# Patient Record
Sex: MALE | Race: WHITE | NOT HISPANIC OR LATINO | Employment: OTHER | URBAN - METROPOLITAN AREA
[De-identification: names, ages, dates, MRNs, and addresses within clinical notes are randomized per-mention and may not be internally consistent; named-entity substitution may affect disease eponyms.]

---

## 2023-09-23 ENCOUNTER — APPOINTMENT (OUTPATIENT)
Dept: RADIOLOGY | Facility: MEDICAL CENTER | Age: 63
DRG: 065 | End: 2023-09-23
Attending: STUDENT IN AN ORGANIZED HEALTH CARE EDUCATION/TRAINING PROGRAM
Payer: COMMERCIAL

## 2023-09-23 ENCOUNTER — HOSPITAL ENCOUNTER (INPATIENT)
Facility: MEDICAL CENTER | Age: 63
LOS: 10 days | DRG: 065 | End: 2023-10-04
Attending: STUDENT IN AN ORGANIZED HEALTH CARE EDUCATION/TRAINING PROGRAM | Admitting: STUDENT IN AN ORGANIZED HEALTH CARE EDUCATION/TRAINING PROGRAM
Payer: COMMERCIAL

## 2023-09-23 DIAGNOSIS — K11.7 DROOLING: ICD-10-CM

## 2023-09-23 DIAGNOSIS — R47.01 APHASIA: ICD-10-CM

## 2023-09-23 DIAGNOSIS — I63.9 ACUTE CVA (CEREBROVASCULAR ACCIDENT) (HCC): ICD-10-CM

## 2023-09-23 DIAGNOSIS — I10 PRIMARY HYPERTENSION: ICD-10-CM

## 2023-09-23 DIAGNOSIS — I48.91 ATRIAL FIBRILLATION, UNSPECIFIED TYPE (HCC): ICD-10-CM

## 2023-09-23 LAB
ALBUMIN SERPL BCP-MCNC: 3.5 G/DL (ref 3.2–4.9)
ALBUMIN/GLOB SERPL: 1 G/DL
ALP SERPL-CCNC: 239 U/L (ref 30–99)
ALT SERPL-CCNC: 69 U/L (ref 2–50)
ANION GAP SERPL CALC-SCNC: 11 MMOL/L (ref 7–16)
AST SERPL-CCNC: 25 U/L (ref 12–45)
BASOPHILS # BLD AUTO: 0.3 % (ref 0–1.8)
BASOPHILS # BLD: 0.02 K/UL (ref 0–0.12)
BILIRUB SERPL-MCNC: 0.3 MG/DL (ref 0.1–1.5)
BUN SERPL-MCNC: 23 MG/DL (ref 8–22)
CALCIUM ALBUM COR SERPL-MCNC: 9 MG/DL (ref 8.5–10.5)
CALCIUM SERPL-MCNC: 8.6 MG/DL (ref 8.5–10.5)
CHLORIDE SERPL-SCNC: 107 MMOL/L (ref 96–112)
CO2 SERPL-SCNC: 22 MMOL/L (ref 20–33)
CREAT SERPL-MCNC: 1.26 MG/DL (ref 0.5–1.4)
EOSINOPHIL # BLD AUTO: 0.25 K/UL (ref 0–0.51)
EOSINOPHIL NFR BLD: 3.6 % (ref 0–6.9)
ERYTHROCYTE [DISTWIDTH] IN BLOOD BY AUTOMATED COUNT: 44.5 FL (ref 35.9–50)
GFR SERPLBLD CREATININE-BSD FMLA CKD-EPI: 64 ML/MIN/1.73 M 2
GLOBULIN SER CALC-MCNC: 3.4 G/DL (ref 1.9–3.5)
GLUCOSE SERPL-MCNC: 261 MG/DL (ref 65–99)
HCT VFR BLD AUTO: 29.4 % (ref 42–52)
HGB BLD-MCNC: 9.3 G/DL (ref 14–18)
IMM GRANULOCYTES # BLD AUTO: 0.05 K/UL (ref 0–0.11)
IMM GRANULOCYTES NFR BLD AUTO: 0.7 % (ref 0–0.9)
LYMPHOCYTES # BLD AUTO: 1.08 K/UL (ref 1–4.8)
LYMPHOCYTES NFR BLD: 15.7 % (ref 22–41)
MCH RBC QN AUTO: 27.4 PG (ref 27–33)
MCHC RBC AUTO-ENTMCNC: 31.6 G/DL (ref 32.3–36.5)
MCV RBC AUTO: 86.7 FL (ref 81.4–97.8)
MONOCYTES # BLD AUTO: 0.45 K/UL (ref 0–0.85)
MONOCYTES NFR BLD AUTO: 6.5 % (ref 0–13.4)
NEUTROPHILS # BLD AUTO: 5.03 K/UL (ref 1.82–7.42)
NEUTROPHILS NFR BLD: 73.2 % (ref 44–72)
NRBC # BLD AUTO: 0 K/UL
NRBC BLD-RTO: 0 /100 WBC (ref 0–0.2)
PLATELET # BLD AUTO: 254 K/UL (ref 164–446)
PMV BLD AUTO: 9.6 FL (ref 9–12.9)
POTASSIUM SERPL-SCNC: 3.8 MMOL/L (ref 3.6–5.5)
PROT SERPL-MCNC: 6.9 G/DL (ref 6–8.2)
RBC # BLD AUTO: 3.39 M/UL (ref 4.7–6.1)
SODIUM SERPL-SCNC: 140 MMOL/L (ref 135–145)
WBC # BLD AUTO: 6.9 K/UL (ref 4.8–10.8)

## 2023-09-23 PROCEDURE — 83036 HEMOGLOBIN GLYCOSYLATED A1C: CPT

## 2023-09-23 PROCEDURE — 99285 EMERGENCY DEPT VISIT HI MDM: CPT

## 2023-09-23 PROCEDURE — 70450 CT HEAD/BRAIN W/O DYE: CPT

## 2023-09-23 PROCEDURE — 71045 X-RAY EXAM CHEST 1 VIEW: CPT

## 2023-09-23 PROCEDURE — 85025 COMPLETE CBC W/AUTO DIFF WBC: CPT

## 2023-09-23 PROCEDURE — 36415 COLL VENOUS BLD VENIPUNCTURE: CPT

## 2023-09-23 PROCEDURE — 80061 LIPID PANEL: CPT

## 2023-09-23 PROCEDURE — 80053 COMPREHEN METABOLIC PANEL: CPT

## 2023-09-24 ENCOUNTER — APPOINTMENT (OUTPATIENT)
Dept: RADIOLOGY | Facility: MEDICAL CENTER | Age: 63
DRG: 065 | End: 2023-09-24
Payer: COMMERCIAL

## 2023-09-24 PROBLEM — I48.91 AF (ATRIAL FIBRILLATION) (HCC): Status: ACTIVE | Noted: 2023-09-24

## 2023-09-24 PROBLEM — I10 HYPERTENSION: Status: ACTIVE | Noted: 2023-09-24

## 2023-09-24 PROBLEM — I63.9 ACUTE CVA (CEREBROVASCULAR ACCIDENT) (HCC): Status: ACTIVE | Noted: 2023-09-24

## 2023-09-24 PROBLEM — E11.59 TYPE 2 DIABETES MELLITUS WITH CIRCULATORY DISORDER, WITHOUT LONG-TERM CURRENT USE OF INSULIN (HCC): Status: ACTIVE | Noted: 2023-09-24

## 2023-09-24 LAB
APTT PPP: 24.6 SEC (ref 24.7–36)
CHOLEST SERPL-MCNC: 91 MG/DL (ref 100–199)
EST. AVERAGE GLUCOSE BLD GHB EST-MCNC: 169 MG/DL
GLUCOSE BLD STRIP.AUTO-MCNC: 114 MG/DL (ref 65–99)
GLUCOSE BLD STRIP.AUTO-MCNC: 156 MG/DL (ref 65–99)
GLUCOSE BLD STRIP.AUTO-MCNC: 167 MG/DL (ref 65–99)
GLUCOSE BLD STRIP.AUTO-MCNC: 176 MG/DL (ref 65–99)
GLUCOSE BLD STRIP.AUTO-MCNC: 194 MG/DL (ref 65–99)
GLUCOSE BLD STRIP.AUTO-MCNC: 265 MG/DL (ref 65–99)
GLUCOSE BLD STRIP.AUTO-MCNC: 79 MG/DL (ref 65–99)
HBA1C MFR BLD: 7.5 % (ref 4–5.6)
HDLC SERPL-MCNC: 27 MG/DL
INR PPP: 1.09 (ref 0.87–1.13)
LDLC SERPL CALC-MCNC: 48 MG/DL
PROTHROMBIN TIME: 14.2 SEC (ref 12–14.6)
TRIGL SERPL-MCNC: 79 MG/DL (ref 0–149)

## 2023-09-24 PROCEDURE — 700117 HCHG RX CONTRAST REV CODE 255: Performed by: STUDENT IN AN ORGANIZED HEALTH CARE EDUCATION/TRAINING PROGRAM

## 2023-09-24 PROCEDURE — 70496 CT ANGIOGRAPHY HEAD: CPT

## 2023-09-24 PROCEDURE — 700102 HCHG RX REV CODE 250 W/ 637 OVERRIDE(OP)

## 2023-09-24 PROCEDURE — 700101 HCHG RX REV CODE 250

## 2023-09-24 PROCEDURE — 99222 1ST HOSP IP/OBS MODERATE 55: CPT | Performed by: STUDENT IN AN ORGANIZED HEALTH CARE EDUCATION/TRAINING PROGRAM

## 2023-09-24 PROCEDURE — 700111 HCHG RX REV CODE 636 W/ 250 OVERRIDE (IP)

## 2023-09-24 PROCEDURE — 82962 GLUCOSE BLOOD TEST: CPT | Mod: 91

## 2023-09-24 PROCEDURE — 99223 1ST HOSP IP/OBS HIGH 75: CPT | Performed by: STUDENT IN AN ORGANIZED HEALTH CARE EDUCATION/TRAINING PROGRAM

## 2023-09-24 PROCEDURE — 700102 HCHG RX REV CODE 250 W/ 637 OVERRIDE(OP): Performed by: STUDENT IN AN ORGANIZED HEALTH CARE EDUCATION/TRAINING PROGRAM

## 2023-09-24 PROCEDURE — 70498 CT ANGIOGRAPHY NECK: CPT

## 2023-09-24 PROCEDURE — A9270 NON-COVERED ITEM OR SERVICE: HCPCS

## 2023-09-24 PROCEDURE — A9270 NON-COVERED ITEM OR SERVICE: HCPCS | Performed by: STUDENT IN AN ORGANIZED HEALTH CARE EDUCATION/TRAINING PROGRAM

## 2023-09-24 PROCEDURE — 85730 THROMBOPLASTIN TIME PARTIAL: CPT

## 2023-09-24 PROCEDURE — 85610 PROTHROMBIN TIME: CPT

## 2023-09-24 PROCEDURE — 92611 MOTION FLUOROSCOPY/SWALLOW: CPT

## 2023-09-24 PROCEDURE — 36415 COLL VENOUS BLD VENIPUNCTURE: CPT

## 2023-09-24 PROCEDURE — 96374 THER/PROPH/DIAG INJ IV PUSH: CPT

## 2023-09-24 PROCEDURE — 0042T CT-CEREBRAL PERFUSION ANALYSIS: CPT

## 2023-09-24 PROCEDURE — 96372 THER/PROPH/DIAG INJ SC/IM: CPT

## 2023-09-24 PROCEDURE — 770020 HCHG ROOM/CARE - TELE (206)

## 2023-09-24 PROCEDURE — 74230 X-RAY XM SWLNG FUNCJ C+: CPT

## 2023-09-24 PROCEDURE — 700105 HCHG RX REV CODE 258: Performed by: STUDENT IN AN ORGANIZED HEALTH CARE EDUCATION/TRAINING PROGRAM

## 2023-09-24 PROCEDURE — 92610 EVALUATE SWALLOWING FUNCTION: CPT

## 2023-09-24 PROCEDURE — 96375 TX/PRO/DX INJ NEW DRUG ADDON: CPT

## 2023-09-24 RX ORDER — BACLOFEN 10 MG/1
10 TABLET ORAL 2 TIMES DAILY PRN
Status: ON HOLD | COMMUNITY
End: 2023-10-04

## 2023-09-24 RX ORDER — ASPIRIN 81 MG/1
81 TABLET, CHEWABLE ORAL DAILY
Status: DISCONTINUED | OUTPATIENT
Start: 2023-09-24 | End: 2023-09-25

## 2023-09-24 RX ORDER — ATORVASTATIN CALCIUM 40 MG/1
40 TABLET, FILM COATED ORAL EVERY EVENING
Status: DISCONTINUED | OUTPATIENT
Start: 2023-09-24 | End: 2023-10-04 | Stop reason: HOSPADM

## 2023-09-24 RX ORDER — SCOLOPAMINE TRANSDERMAL SYSTEM 1 MG/1
1 PATCH, EXTENDED RELEASE TRANSDERMAL
Status: DISCONTINUED | OUTPATIENT
Start: 2023-09-24 | End: 2023-10-04 | Stop reason: HOSPADM

## 2023-09-24 RX ORDER — ASPIRIN 325 MG
325 TABLET ORAL DAILY
Status: DISCONTINUED | OUTPATIENT
Start: 2023-09-24 | End: 2023-09-24

## 2023-09-24 RX ORDER — ASPIRIN 81 MG/1
324 TABLET, CHEWABLE ORAL ONCE
Status: DISCONTINUED | OUTPATIENT
Start: 2023-09-24 | End: 2023-09-24

## 2023-09-24 RX ORDER — ATORVASTATIN CALCIUM 40 MG/1
40 TABLET, FILM COATED ORAL DAILY
COMMUNITY
End: 2023-10-22 | Stop reason: SDUPTHER

## 2023-09-24 RX ORDER — AMLODIPINE BESYLATE 5 MG/1
10 TABLET ORAL
Status: DISCONTINUED | OUTPATIENT
Start: 2023-09-25 | End: 2023-10-04 | Stop reason: HOSPADM

## 2023-09-24 RX ORDER — ACETAMINOPHEN 500 MG
500-2000 TABLET ORAL 2 TIMES DAILY PRN
Status: ON HOLD | COMMUNITY
End: 2023-10-04

## 2023-09-24 RX ORDER — OXYCODONE HYDROCHLORIDE 5 MG/1
2.5 TABLET ORAL
Status: DISCONTINUED | OUTPATIENT
Start: 2023-09-24 | End: 2023-10-04 | Stop reason: HOSPADM

## 2023-09-24 RX ORDER — LABETALOL HYDROCHLORIDE 5 MG/ML
10 INJECTION, SOLUTION INTRAVENOUS EVERY 4 HOURS PRN
Status: DISCONTINUED | OUTPATIENT
Start: 2023-09-24 | End: 2023-10-04 | Stop reason: HOSPADM

## 2023-09-24 RX ORDER — ASPIRIN 300 MG/1
300 SUPPOSITORY RECTAL DAILY
Status: DISCONTINUED | OUTPATIENT
Start: 2023-09-25 | End: 2023-09-24

## 2023-09-24 RX ORDER — ATROPINE SULFATE 10 MG/ML
2 SOLUTION/ DROPS OPHTHALMIC EVERY 4 HOURS PRN
Status: DISCONTINUED | OUTPATIENT
Start: 2023-09-24 | End: 2023-10-04 | Stop reason: HOSPADM

## 2023-09-24 RX ORDER — ATORVASTATIN CALCIUM 80 MG/1
80 TABLET, FILM COATED ORAL EVERY EVENING
Status: DISCONTINUED | OUTPATIENT
Start: 2023-09-24 | End: 2023-09-24

## 2023-09-24 RX ORDER — LABETALOL HYDROCHLORIDE 5 MG/ML
10 INJECTION, SOLUTION INTRAVENOUS EVERY 4 HOURS PRN
Status: DISCONTINUED | OUTPATIENT
Start: 2023-09-24 | End: 2023-09-24

## 2023-09-24 RX ORDER — OXYCODONE HYDROCHLORIDE 5 MG/1
5 TABLET ORAL
Status: DISCONTINUED | OUTPATIENT
Start: 2023-09-24 | End: 2023-10-04 | Stop reason: HOSPADM

## 2023-09-24 RX ORDER — SODIUM CHLORIDE, SODIUM LACTATE, POTASSIUM CHLORIDE, CALCIUM CHLORIDE 600; 310; 30; 20 MG/100ML; MG/100ML; MG/100ML; MG/100ML
INJECTION, SOLUTION INTRAVENOUS CONTINUOUS
Status: ACTIVE | OUTPATIENT
Start: 2023-09-24 | End: 2023-09-24

## 2023-09-24 RX ORDER — ASPIRIN 325 MG
325 TABLET, DELAYED RELEASE (ENTERIC COATED) ORAL DAILY
Status: ON HOLD | COMMUNITY
End: 2023-10-04

## 2023-09-24 RX ORDER — BETHANECHOL CHLORIDE 10 MG/1
10 TABLET ORAL 3 TIMES DAILY
Status: ON HOLD | COMMUNITY
End: 2023-10-04

## 2023-09-24 RX ORDER — ACETAMINOPHEN 325 MG/1
650 TABLET ORAL EVERY 6 HOURS PRN
Status: DISCONTINUED | OUTPATIENT
Start: 2023-09-24 | End: 2023-10-04 | Stop reason: HOSPADM

## 2023-09-24 RX ORDER — AMLODIPINE BESYLATE 5 MG/1
5 TABLET ORAL
Status: DISCONTINUED | OUTPATIENT
Start: 2023-09-24 | End: 2023-09-24

## 2023-09-24 RX ORDER — LISINOPRIL 10 MG/1
5 TABLET ORAL
Status: DISCONTINUED | OUTPATIENT
Start: 2023-09-24 | End: 2023-09-24

## 2023-09-24 RX ORDER — HYDRALAZINE HYDROCHLORIDE 20 MG/ML
10 INJECTION INTRAMUSCULAR; INTRAVENOUS EVERY 4 HOURS PRN
Status: DISCONTINUED | OUTPATIENT
Start: 2023-09-24 | End: 2023-10-04 | Stop reason: HOSPADM

## 2023-09-24 RX ORDER — ASPIRIN 300 MG/1
300 SUPPOSITORY RECTAL DAILY
Status: DISCONTINUED | OUTPATIENT
Start: 2023-09-24 | End: 2023-09-25

## 2023-09-24 RX ORDER — ASPIRIN 81 MG/1
81 TABLET, CHEWABLE ORAL DAILY
Status: DISCONTINUED | OUTPATIENT
Start: 2023-09-25 | End: 2023-09-24

## 2023-09-24 RX ORDER — INSULIN GLARGINE 100 [IU]/ML
25 INJECTION, SOLUTION SUBCUTANEOUS EVERY MORNING
COMMUNITY
End: 2023-10-22 | Stop reason: SDUPTHER

## 2023-09-24 RX ORDER — HYDROMORPHONE HYDROCHLORIDE 1 MG/ML
0.25 INJECTION, SOLUTION INTRAMUSCULAR; INTRAVENOUS; SUBCUTANEOUS
Status: DISCONTINUED | OUTPATIENT
Start: 2023-09-24 | End: 2023-10-04 | Stop reason: HOSPADM

## 2023-09-24 RX ORDER — AMLODIPINE BESYLATE 5 MG/1
5 TABLET ORAL DAILY
COMMUNITY
End: 2023-10-22 | Stop reason: SDUPTHER

## 2023-09-24 RX ORDER — DEXTROSE MONOHYDRATE 25 G/50ML
25 INJECTION, SOLUTION INTRAVENOUS
Status: DISCONTINUED | OUTPATIENT
Start: 2023-09-24 | End: 2023-09-26

## 2023-09-24 RX ORDER — INSULIN LISPRO 100 [IU]/ML
1-6 INJECTION, SOLUTION INTRAVENOUS; SUBCUTANEOUS EVERY 6 HOURS
Status: DISCONTINUED | OUTPATIENT
Start: 2023-09-24 | End: 2023-09-26

## 2023-09-24 RX ADMIN — INSULIN GLARGINE-YFGN 10 UNITS: 100 INJECTION, SOLUTION SUBCUTANEOUS at 18:03

## 2023-09-24 RX ADMIN — INSULIN LISPRO 1 UNITS: 100 INJECTION, SOLUTION INTRAVENOUS; SUBCUTANEOUS at 06:09

## 2023-09-24 RX ADMIN — LABETALOL HYDROCHLORIDE 10 MG: 5 INJECTION INTRAVENOUS at 17:53

## 2023-09-24 RX ADMIN — ATROPINE SULFATE 2 DROP: 10 SOLUTION/ DROPS OPHTHALMIC at 22:03

## 2023-09-24 RX ADMIN — ASPIRIN 300 MG: 300 SUPPOSITORY RECTAL at 03:05

## 2023-09-24 RX ADMIN — AMLODIPINE BESYLATE 5 MG: 5 TABLET ORAL at 14:07

## 2023-09-24 RX ADMIN — IOHEXOL 80 ML: 350 INJECTION, SOLUTION INTRAVENOUS at 00:30

## 2023-09-24 RX ADMIN — INSULIN LISPRO 1 UNITS: 100 INJECTION, SOLUTION INTRAVENOUS; SUBCUTANEOUS at 02:45

## 2023-09-24 RX ADMIN — ATORVASTATIN CALCIUM 40 MG: 40 TABLET, FILM COATED ORAL at 18:02

## 2023-09-24 RX ADMIN — SODIUM CHLORIDE, POTASSIUM CHLORIDE, SODIUM LACTATE AND CALCIUM CHLORIDE: 600; 310; 30; 20 INJECTION, SOLUTION INTRAVENOUS at 05:06

## 2023-09-24 RX ADMIN — HYDRALAZINE HYDROCHLORIDE 10 MG: 20 INJECTION, SOLUTION INTRAMUSCULAR; INTRAVENOUS at 15:05

## 2023-09-24 RX ADMIN — OXYCODONE HYDROCHLORIDE 5 MG: 5 TABLET ORAL at 16:29

## 2023-09-24 RX ADMIN — SCOPOLAMINE 1 PATCH: 1.5 PATCH, EXTENDED RELEASE TRANSDERMAL at 03:01

## 2023-09-24 RX ADMIN — ATROPINE SULFATE 2 DROP: 10 SOLUTION/ DROPS OPHTHALMIC at 14:40

## 2023-09-24 RX ADMIN — ACETAMINOPHEN 650 MG: 325 TABLET, FILM COATED ORAL at 16:29

## 2023-09-24 RX ADMIN — IOHEXOL 70 ML: 350 INJECTION, SOLUTION INTRAVENOUS at 00:30

## 2023-09-24 ASSESSMENT — ENCOUNTER SYMPTOMS
SORE THROAT: 0
WEAKNESS: 0
CHILLS: 0
NECK PAIN: 0
SPEECH CHANGE: 1
DOUBLE VISION: 0
DIZZINESS: 0
COUGH: 0
BLURRED VISION: 0
HEADACHES: 0
NAUSEA: 0
HEARTBURN: 0
SHORTNESS OF BREATH: 0
FEVER: 0
FOCAL WEAKNESS: 0
MYALGIAS: 0
PALPITATIONS: 0

## 2023-09-24 ASSESSMENT — LIFESTYLE VARIABLES
TOTAL SCORE: 0
EVER HAD A DRINK FIRST THING IN THE MORNING TO STEADY YOUR NERVES TO GET RID OF A HANGOVER: NO
ON A TYPICAL DAY WHEN YOU DRINK ALCOHOL HOW MANY DRINKS DO YOU HAVE: 0
TOTAL SCORE: 0
HAVE PEOPLE ANNOYED YOU BY CRITICIZING YOUR DRINKING: NO
TOTAL SCORE: 0
DO YOU DRINK ALCOHOL: NO
EVER FELT BAD OR GUILTY ABOUT YOUR DRINKING: NO
AVERAGE NUMBER OF DAYS PER WEEK YOU HAVE A DRINK CONTAINING ALCOHOL: 0
CONSUMPTION TOTAL: NEGATIVE
HOW MANY TIMES IN THE PAST YEAR HAVE YOU HAD 5 OR MORE DRINKS IN A DAY: 0
HAVE YOU EVER FELT YOU SHOULD CUT DOWN ON YOUR DRINKING: NO

## 2023-09-24 ASSESSMENT — PAIN DESCRIPTION - PAIN TYPE: TYPE: ACUTE PAIN

## 2023-09-24 NOTE — ED NOTES
Bedside report to Renea DARLING. Pt on RA, still drooling, able to do self suctioning of oral secretions. Remains aphasic and communicates with [employer].

## 2023-09-24 NOTE — ED NOTES
ERP at bedside. Patient family members reporting last seen normal at 1500. Patient stopped talking around that time and has had difficulty swallowing since then.

## 2023-09-24 NOTE — THERAPY
"   Speech Language Pathology   Videofluoroscopic Swallow Study Evaluation     Patient Name: Zane Galeano  AGE:  63 y.o., SEX:  male  Medical Record #: [id number]  Date of Service: 9/24/2023      History of Present Illness  61 y/o male presented 9/23 with aphasia. Pt reports a \"massive stroke\" two years ago and several TIAs since, as recent as six mo ago.      Pertinent Information  Current Method of Nutrition: NPO until cleared by speech pathology  Patient Behaviors:  (cooperative)  Dentition: Fair, Natural dentition, Some missing dentition  Secretion Management: Drooling; requested suctioning, which was not working correctly in the fluoro suite. Pt frequently using towel to control secretions/anterior loss of bolus  Feeding Tube: None  Tracheostomy: No   Factor(s) Affecting Performance: Suction not available      Discussed the risks, benefits, and alternatives of the VFSS procedure. Patient/family acknowledged and agreed to proceed.      Assessment  Videofluoroscopic Swallow Study was conducted in the Lateral projection(s) to evaluate oropharyngeal swallow function. A radiology tech was present to assist with the procedure.      Positioning: Seated upright in fluoroscopy chair  Anatomic View: Concern for web on esophageal wall; did not appear to have impact on swallow function  Bolus Administration: SLP, Patient  PO Barium Contrast Trials: Varibar thin, Varibar nectar (mildly thick), Liquidised mixed with Varibar powder, Varibar pudding, Soft & Bite sized coated with Varibar powder, Regular solid coated with Varibar pudding, Mixed consistency with Varibar powder      Consistency PAS Score Timing Residue Comments   Thin Liquid 7 During swallow, Post swallow Vallecular Residue: Mild (5%-25%)  Pyriform Sinus Residue: Mild (5%-25%) Tsp - PAS 1  Cup - PAS 1 x2, PAS 2 x3, PAS 3 x1, PAS 7 x1; PAS 3 after x1  Straw - Unable to coordinate   Mildly Thick 7 Before swallow, During Swallow Vallecular Residue: Mild " (5%-25%)  Pyriform Sinus Residue: Mild (5%-25%) Cup - PAS 1 x3, PAS 7   Liquidised 1 N/A Vallecular Residue: Moderate (25%-50%)  Pyriform Sinus Residue: Moderate (25%-50%)    Pudding 1 N/A Vallecular Residue: Trace (1%-5%)  Pyriform Sinus Residue: Trace (1%-5%)    Soft & Bite Sized 1 N/A Vallecular Residue: Mild (5%-25%)  Pyriform Sinus Residue: Moderate (25%-50%)    Regular Solid 1 N/A Vallecular Residue: Mild (5%-25%)  Pyriform Sinus Residue: Mild (5%-25%)    Mixed 3 During Swallow, Post Swallow Vallecular Residue: Mild (5%-25%)  Pyriform Sinus Residue: Moderate (25%-50%) PAS 3 during/after; PAS 1     Penetration-Aspiration Scale (PAS)  1     No contrast enters airway  2     Contrast enters the airway, remains above the vocal folds, and is ejected from the airway (not seen in the airway at the end of the swallow).  3     Contrast enters the airway, remains above the vocal folds, and is not ejected from the airway (is seen in the airway after the swallow).  4     Contrast enters the airway, contacts the vocal folds, and is ejected from the airway.  5     Contrast enters the airway, contacts the vocal folds, and is not ejected from the airway  6     Contrast enters the airway, crosses the plane of the vocal folds, and is ejected from the airway.  7     Contrast enters the airway, crosses the plane of the vocal folds, and is not ejected from the airway despite effort.  8     Contrast enters the airway, crosses the plane of the vocal folds, is not ejected from the airway and there is no response to aspiration.       Oral phase:  Consistent anterior loss of bolus, worse with liquids than solids, to which pt was aware and attempted to control with use of towel. Expectorated partial bolus of SB6 in one attempt. Fair mastication of soft and regular solids. Single instance of loss of bolus control resulting in gross aspiration before/during the swallow of MT2, suspect related to pt throwing his head back in an attempt to  control anterior loss. Unable to coordinate straw suction.       Pharyngeal phase:  Pharyngeal phase characterized by impairments in BOT retraction, epiglottic inversion, LVC, hyolaryngeal movement, and pharyngeal constriction which resulted in the following:  - Single instances of aspiration of TN0 and MT2. Pt was sensate to aspiration, but cough was ineffective to clear completely from the airway. Pt continued to cough throughout the study with likely ongoing sensation to aspiration that did not clear throughout remainder of the study  - Shallow penetration with TN0, MT2, and juice from mixed consistencies during the swallow which inconsistently cleared with completion of the swallow or with cough  - Mild vallecular residue with TN0, MT2, SB6, and RG7 and single instance of moderate vallecular residue with LQ3 that inconsistently cleared spontaneously  - Moderate pyriform sinus residue with LQ3 and SB6/mixed that inconsistently cleared spontaneously  -  Mild PPW residue throughout study that inconsistently clear spontaneously      Esophageal Phase:  AP esophageal screen was not completed given fluoro. No retention or retrograde flow noted in limited view of upper esophagus.       Compensatory Strategies:  Right-head turn - INEFFECTIVE to prevent airway invasion with thins; resulted in aspiration  Cough - INEFFECTIVE to clear aspirate    Neutral/slightly turned down head position - EFFECTIVE to significantly reduce airway invasion  Cleansing swallow - EFFECTIVE to reduce/clear residue  Cough, cleansing swallow - EFFECTIVE to clear penetrate        Severity Rating:   DEVEN: Moderate      Clinical Impressions  The pt presents with a moderate oropharyngeal dysphagia, likely acute related to CVA. Swallow safety is impaired; pharyngeal efficiency is impaired. Would recommend a cautious initiation of PO with a PU/TN diet and STRICT STRATEGY USE at this time. Pt will need 1:1 spv during meals as well as verbal cues to  "reinforce strategy use including head positioning. Pt will likely benefit from repeat diagnostic evaluation prior to upgrading diet given severity of dysphagia and ineffective cough. Pt appears to be a good candidate for ongoing behavorial and exercise-based swallow rehabilitation. Dysphagia outcomes can be maximized with use of mobility as pt is able and frequent, thorough oral care. SLP following closely; will also plan to complete formal aphasia evaluation in coming days.       Recommendations  Puree solids with thin liquids - STRICT STRATEGY USE    Medication: Crush with applesauce, as appropriate, Crush with pudding/puree, as appropriate  Supervision: 1:1 feeding with constant supervision, Encourage self-feeding, may need verbal cues for strategies  Positioning: Fully upright and midline during oral intake, Meals sitting upright in a chair, as tolerated  Strategies: Small bites/sips, Slow rate of intake, No straws, Multiple swallows (2-3) per bite/sips, Use of suction during meals, neutral/downward facing head positioning, physical mobility as tolerated  Oral Care: Q4h  Additional Instrumentation: Repeat diagnostic study when clinically appropriate         SLP Treatment Plan  Treatment Plan: Dysphagia Treatment, Speech-Language Treatment, Patient/Family/Caregiver Training  SLP Frequency: 4x Per Week  Estimated Duration: Until Therapy Goals Met      Anticipated Discharge Needs  Discharge Recommendations: Recommend post-acute placement for additional speech therapy services prior to discharge home   Therapy Recommendations Upon DC: Dysphagia Training, Expression Training, Patient / Family / Caregiver Education, Community Re-Integration (per results of SLE, pending)        Patient / Family Goals  Patient / Family Goal #1: \"I can't control liquids.\"  Short Term Goal # 1: Pt will participate in MBSS w SLP to further evaluate swallow function and inform POC.  Goal Outcome # 1: Goal met, new goal added  Short Term " "Goal # 1 B : Pt will consume diet of PU/TN given 1:1 spv and strict strategy use with no worsening of respiratory status.  Short Term Goal # 2: Pt will complete exercises targeting BOT retraction, epiglottic inversion, LVC, hyolaryngeal movement, and pharyngeal constriction x30 in a session given min cues from SLP with \"good\" accuracy.      Mariam Francis, SLP   "

## 2023-09-24 NOTE — ED NOTES
Pt reporting 8/10 pain in BL feet and states that he normally takes THC for pain. Educated pt not to take THC while in the hospital, updated APRN Jake and orders placed for PRN medications. Medicated pt per MAR.

## 2023-09-24 NOTE — CONSULTS
Neurology Initial Consult H&P  Neurohospitalist Service, [facility]    Referring Physician: KIYA Jackson    Chief Complaint   Patient presents with    Difficulty Breathing     PT BIBA for a partial airway obstruction. Patient was eating at home and exerienced choking on a hamburger. Patient was able to dislodge some of the obstruction on EMS arrival. Patient dislodged what he felt was the rest of it on arrival to ED.        HPI: Zane Galeano is a 63 y.o. male with PMH of prior stroke without residual deficits, atrial fibrillation (no AC), DM, CKD stage IV who presented to [facility] for the onset of aphasia around 2 PM 9/23/2023.  Last known normal was around noon prior to patient going to sleep for a nap.  Patient's family reportedly were aware of patient's speech deficit but it was unclear at the time that would be concerning enough to bring him into the emergency department for evaluation secondary to a patient's previous stroke with similar symptoms.  While out to dinner with his family however patient choked on a hamburger which was what prompted his evaluation in the emergency department.  CT head and CTA were completed.  There was no convincing evidence of acute ischemic injury however there was demonstration of old right parietal occipital encephalomalacia and right basal ganglia and internal capsule lacunar strokes.  CTA demonstrated bilateral MCA arterial occlusions.  CTP imaging did not have demonstrable evidence of an acute perfusion defect.  In the setting of patient's bilateral MCA collateralization there is strong suspicion for moyamoya disease.  Patient reports being homeless and has only had evaluations for prior strokes as an inpatient during hospitalizations.  Patient cannot recall any follow-up with medical providers that would help explain the cause of his recurrent strokes.  Other than patient's acute language deficits which include  inability to produce any meaningful speech but does demonstrate intact comprehension and ability to write a syndrome is most consistent with an aphemia.      Review of systems: In addition to what is detailed in the HPI above, all other systems reviewed and are negative.    Past Medical History:    has no past medical history on file.    FHx:  family history is not on file.    SHx:       Allergies:  Not on File    Medications:    Current Facility-Administered Medications:     aspirin (Asa) chewable tab 324 mg, 324 mg, Oral, Once, Kaylin Villarreal D.O.    lactated ringers infusion, , Intravenous, Continuous, José Barrett M.D., Last Rate: 100 mL/hr at 09/24/23 0506, New Bag at 09/24/23 0506    scopolamine (Transderm-Scop) patch 1 Patch, 1 Patch, Transdermal, Q72HRS, José Barrett M.D., 1 Patch at 09/24/23 0301    atorvastatin (Lipitor) tablet 80 mg, 80 mg, Oral, Q EVENING, José Barrett M.D.    insulin GLARGINE (Lantus,Semglee) injection, 10 Units, Subcutaneous, Q EVENING **AND** insulin lispro (HumaLOG,AdmeLOG) injection, 1-6 Units, Subcutaneous, Q6HRS, 1 Units at 09/24/23 0609 **AND** POC blood glucose manual result, , , Q6H **AND** NOTIFY MD and PharmD, , , Once **AND** Administer 20 grams of glucose (approximately 8 ounces of fruit juice) every 15 minutes PRN FSBG less than 70 mg/dL, , , PRN **AND** dextrose 50% (D50W) injection 25 g, 25 g, Intravenous, Q15 MIN PRN, José Barrett M.D.    aspirin (Asa) chewable tab 81 mg, 81 mg, Oral, DAILY **OR** aspirin (Asa) suppository 300 mg, 300 mg, Rectal, DAILY, José Barrett M.D., 300 mg at 09/24/23 0305    Current Outpatient Medications:     insulin glargine (LANTUS) 100 UNIT/ML SC SOLN, Inject 25 Units under the skin every morning., Disp: , Rfl:     Physical Examination:     General: Patient is awake and in no acute distress  Eye: Examination of optic disks not indicated at this time given acuity of consult  Neck: There is normal range of  "motion  CV: regular rate   Extremities:  clear, dry, intact, without peripheral edema    NEUROLOGICAL EXAM:     BP (!) 171/73   Pulse (!) 58   Temp 36.9 °C (98.4 °F)   Resp 18   Ht 1.803 m (5' 11\")   Wt 63.5 kg (140 lb)   SpO2 96%   BMI 19.53 kg/m²       Mental status: Awake, alert and fully oriented  Speech and language: Patient demonstrates aphemia, he can produce only noises, no words but has full comprehension and can write.   Cranial nerve exam: Pupils are equal, round and reactive to light bilaterally. Visual fields are full. There is no nystagmus. Extraocular muscles are intact. Left facial droop. Sensation in the face is intact to light touch.Tongue deviates to the left  Motor exam: There is sustained antigravity with no downward drift in bilateral arms and legs.  There is no pronator drift. Tone is normal. No abnormal movements were seen on exam.  Sensory exam: Reacts to tactile in all 4 extremities, no neglect to double stim   Deep tendon reflexes:  1 to 2 + throughout. Toes down-going on the right, upgoing on the left  Coordination: No ataxia on bilateral finger-to-nose testing  Gait: Deferred due to patient preference      NIHSS: National Institutes of Health Stroke Scale    [0] 1a:Level of Consciousness    0-alert 1-drowsy   2-stupor   3-coma  [0] 1b:LOC Questions                  0-both  1-one      2-neither  [0] 1c:LOC Commands                   0-both  1-one      2-neither  [0] 2: Best Gaze                     0-nl    1-partial  2-forced  [0] 3: Visual Fields                   0-nl    1-partial  2-complete 3-bilat  [2] 4: Facial Paresis                0-nl    1-minor    2-partial  3-full  MOTOR                       0-nl  [0] 5: Right Arm           1-drift  [0] 6: Left Arm             2-some effort vs gravity  [0] 7: Right Leg           3-no effort vs gravity  [0] 8: Left Leg             4-no movement                             x-untestable  [0] 9: Limb Ataxia                    0-abs   " 1-1_limb   2-2+_limbs       x-untestable  [0] 10:Sensory                        0-nl    1-partial  2-dense  [1] 11:Best Language/Aphasia         0-nl    1-mild/mod 2-severe   3-mute  [0] 12:Dysarthria                     0-nl    1-mild/mod 2-severe       x-untestable  [0] 13:Neglect/Inattention            0-none  1-partial  2-complete  [3] TOTAL Delta 1      Objective Data:    Labs:  Lab Results   Component Value Date/Time    PROTHROMBTM 14.2 09/24/2023 12:23 AM    INR 1.09 09/24/2023 12:23 AM      Lab Results   Component Value Date/Time    WBC 6.9 09/23/2023 11:00 PM    RBC 3.39 (L) 09/23/2023 11:00 PM    HEMOGLOBIN 9.3 (L) 09/23/2023 11:00 PM    HEMATOCRIT 29.4 (L) 09/23/2023 11:00 PM    MCV 86.7 09/23/2023 11:00 PM    MCH 27.4 09/23/2023 11:00 PM    MCHC 31.6 (L) 09/23/2023 11:00 PM    MPV 9.6 09/23/2023 11:00 PM    NEUTSPOLYS 73.20 (H) 09/23/2023 11:00 PM    LYMPHOCYTES 15.70 (L) 09/23/2023 11:00 PM    MONOCYTES 6.50 09/23/2023 11:00 PM    EOSINOPHILS 3.60 09/23/2023 11:00 PM    BASOPHILS 0.30 09/23/2023 11:00 PM      Lab Results   Component Value Date/Time    SODIUM 140 09/23/2023 11:00 PM    POTASSIUM 3.8 09/23/2023 11:00 PM    CHLORIDE 107 09/23/2023 11:00 PM    CO2 22 09/23/2023 11:00 PM    GLUCOSE 261 (H) 09/23/2023 11:00 PM    BUN 23 (H) 09/23/2023 11:00 PM    CREATININE 1.26 09/23/2023 11:00 PM      Lab Results   Component Value Date/Time    CHOLSTRLTOT 91 (L) 09/23/2023 11:00 PM    LDL 48 09/23/2023 11:00 PM    HDL 27 (A) 09/23/2023 11:00 PM    TRIGLYCERIDE 79 09/23/2023 11:00 PM       Lab Results   Component Value Date/Time    ALKPHOSPHAT 239 (H) 09/23/2023 11:00 PM    ASTSGOT 25 09/23/2023 11:00 PM    ALTSGPT 69 (H) 09/23/2023 11:00 PM    TBILIRUBIN 0.3 09/23/2023 11:00 PM        Imaging/Testing:    I interpreted and/or reviewed the patient's neuroimaging    CT-CTA NECK WITH & W/O-POST PROCESSING   Final Result      CT angiogram of the neck within normal limits.      CT-CTA HEAD WITH & W/O-POST PROCESS    Final Result      1.  Occlusion of the M1 segments of the middle cerebral artery is noted bilaterally. These occlusions are likely chronic and collaterals are present as described above.      2.  No other occlusion or filling defect identified.      3.  These findings were discussed with DEVON MEJÍA on 09/24/2023.      CT-CEREBRAL PERFUSION ANALYSIS   Final Result      1.  Cerebral blood flow less than 30% likely representing completed infarct = 28 mL.      2.  T Max more than 6 seconds likely representing combination of completed infarct and ischemia = 44 mL.      3.  Mismatched volume likely representing ischemic brain/penumbra = 16 mL      4.  Please note that the cerebral perfusion was performed on the limited brain tissue around the basal ganglia region. Infarct/ischemia outside the CT perfusion sections can be missed in this study.      CT-HEAD W/O   Final Result         NO ACUTE ABNORMALITIES ARE NOTED ON CT SCAN OF THE HEAD.      Findings are consistent with atrophy.  Decreased attenuation in the periventricular white matter likely indicates microvascular ischemic disease.         DX-CHEST-PORTABLE (1 VIEW)   Final Result         1.  Possible scarring or atelectasis in the left lung base. Aspiration is also possible.      2.  No other infiltrates or consolidations identified.      MR-BRAIN-W/O    (Results Pending)   EC-ECHOCARDIOGRAM COMPLETE W/ CONT    (Results Pending)       Assessment and Plan:    Zane Galeano is a 63 y.o. male with PMH of prior stroke without residual deficits, atrial fibrillation (no AC), DM, CKD stage IV who presented to [facility] for the onset of aphasia around 2 PM 9/23/2023. CTH at [facility] w/ old right parietal occipital encephalomalacia and right basal ganglia and internal capsule lacunar strokes, CTA with bilateral MCA arterial occlusions.  CTP imaging did not have demonstrable evidence of an acute perfusion defect.  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  Patient will be admitted for MRI and secondary stroke work-up.  Patient will be started on antiplatelet medications and have his blood pressure controlled.  Long-term there should be consideration for MCA bypass surgery.    Problem list:  -- Acute ischemic stroke  -- Moyamoya disease  -- Bilateral middle cerebral artery occlusions    Plan:    - Admit to the hospital for further workup of stroke etiology and to provide secondary stroke prevention measures  - q4hr neurochecks  - Goal blood pressure should be systolic 100-140, patient should be started on oral anti-hypertensive regimen and brought down gradually  - obtain normoglycemia and avoid hypo- or hyper -natremia; aim for normothermia  - Continue aspirin 81 mg daily  - Please decrease statin dose to 40 mg qd as LDL 48  - LDL 48, A1c 7.5  - Please obtain brain MRI  - Obtain a 2D echocardiogram w/ bubble study  - evaluate and treat with PT/OT/ST  -- Pending MRI and completion of stroke work up; patient should be counseled on beginning anticoagulation in the setting of atrial fibrillation, CHADSVASC score is 5 points indicating a signficant risk of stroke from cardioembolism, mechanism of current stroke is likely independent of his atrial fibrillation but the additional unmitigated risk of a cardioembolic stroke could be catastrophic in patients current clinical scenario    Laci Guillen III, MD  Vascular Neurology, [facility]

## 2023-09-24 NOTE — ED NOTES
Med rec completed per patient and patient medication list.   Patient unable to communicate verbally, but was able to confirm medications as shown on a medication list from [facility] by writing. Was able to confirm medications, instructions, and dates of last dose through this communication method.   NO Noted home antibiotics in past 30 days. Was prescribed Keflex 500mg on 09/21 but never started.    Patient preferred pharmacy: [facility]

## 2023-09-24 NOTE — ED NOTES
Received bedside report from LISSET Hutchinson. Pt resting in hospital with suction in hand. Pt had clip board to communicate-fresh paper provided. Maintenance  fluids infusing. Call light in reach. Will continue to monitor.

## 2023-09-24 NOTE — ED NOTES
This RN rounded on pt. Pt resting comfortably in bed at this time. On vitals monitor. Respirations equal and unlabored. Vitals signs stable. No acute distress at this time. Call light within reach.

## 2023-09-24 NOTE — THERAPY
"Speech Language Pathology   Clinical Swallow Evaluation     Patient Name: Zane Galeano  AGE:  63 y.o., SEX:  male  Medical Record #: [id number]  Date of Service: 9/24/2023      History of Present Illness  63 y/o male presented 9/23 with aphasia. Pt reports a \"massive stroke\" two years ago and several TIAs since, as recent as six mo ago.    No hx SLP available in Epic; unclear report of previous SLP service from pt.    CXR 9/23:  \"1.  Possible scarring or atelectasis in the left lung base. Aspiration is also possible.  2.  No other infiltrates or consolidations identified.\"    CT Perfusion 9/23:  \"1.  Cerebral blood flow less than 30% likely representing completed infarct = 28 mL.  2.  T Max more than 6 seconds likely representing combination of completed infarct and ischemia = 44 mL.  3.  Mismatched volume likely representing ischemic brain/penumbra = 16 mL\"    CT/CTA Head 9/23:  \"1.  Occlusion of the M1 segments of the middle cerebral artery is noted bilaterally. These occlusions are likely chronic and collaterals are present as described above.  2.  No other occlusion or filling defect identified.\"    MRI Brain pending.    General Information:  Vitals  Pulse Oximetry: 97 %  O2 Delivery Device: None - Room Air  Follows Directives: Yes      Prior Living Situation & Level of Function:  Comments: Previous CVA, reports recovery of function since   Communication: Previous CVA, reports recovery of function since  Swallowing: Previous CVA, reports recovery of function since. Said med team was \"worried about his swallow\" after his last stroke but reports he did not have modified diet or dx evaluation completed. Reports he was eating RG/TN until a few days ago when he had a choking episode with a hamburger       Oral Mechanism Evaluation:  Dentition: Fair, Natural dentition, Some missing dentition   Facial Symmetry: Total right facial droop  Facial Sensation: Equal     Labial Observations: Right sided weakness   Lingual " "Observations: Right lingual deviation  Motor Speech: Very limited verbalizations; dysarthric on these with impact on intelligibility            Laryngeal Function:  Secretion Management: Drooling  Voice Quality: Hoarse  Cough: Perceptually WNL  Comments: Pt reports drooling/anterior loss is not baseline. Is aware/sensate to both and used towel/suction to independently manage      Subjective  Pt agreeable and cooperative with SLP evaluation tasks. Use of writing and Y/N questions for clinical  interview. Needs formal aphasia evaluation. \"When?\"      Assessment  Current Method of Nutrition: NPO until cleared by speech pathology  Positioning: Coulter's (60-90 degrees)  Bolus Administration: Patient    O2 Delivery Device: None - Room Air  Factor(s) Affecting Performance: None  Tracheostomy : No      Swallowing Trials:  Ice:  Offered, pt declined - \"Too cold.\"  Thin Liquid (TN0): Impaired  Liquidised (LQ3): Impaired      Comments: Pt w/ appropriate self-feeding. Able to coordinate straw suction, reported it was more difficult than baseline. Fair bolus stripping from spoon with anterior loss of partial bolus. Swallow was appreciated for all PO with immediate cough following TN0 and throat clear following ~3 bites of LQ3, both concerning for airway invasion. Introduced MBSS to pt who is agreeable.      Clinical Impressions  Pt presents with clinical indicators of and elevated risk for oropharyngeal dysphagia given history of CVA and CT perfusion/clinical presentation concerning for acute CVA. Pt would benefit from further evaluation of swallow using MBSS prior to meaningful initiation of PO. Pt may have ice chips after oral care to prevent further atrophy and xerostomia and to aid with secretion management.         Recommendations  Diet Consistency: NPO pending MBSS  Instrumentation: VFSS (MBSS)  Oral Care: Q4h         SLP Treatment Plan  Treatment Plan: Dysphagia Treatment, Speech-Language Treatment, " "Patient/Family/Caregiver Training  SLP Frequency: 4x Per Week  Estimated Duration: Until Therapy Goals Met      Anticipated Discharge Needs  Discharge Recommendations: Recommend post-acute placement for additional speech therapy services prior to discharge home   Therapy Recommendations Upon DC: Dysphagia Training, Patient / Family / Caregiver Education, Community Re-Integration, Expression Training (per results of SLE, pending)        Patient / Family Goals  Patient / Family Goal #1: \"Can I finish?\"  Short Term Goals  Short Term Goal # 1: Pt will participate in MBSS w SLP to further evaluate swallow function and inform POC.      Mariam Francis, SLP   "

## 2023-09-24 NOTE — ED PROVIDER NOTES
ED Provider Note    CHIEF COMPLAINT  Chief Complaint   Patient presents with    Difficulty Breathing     PT BIBA for a partial airway obstruction. Patient was eating at home and exerienced choking on a hamburger. Patient was able to dislodge some of the obstruction on EMS arrival. Patient dislodged what he felt was the rest of it on arrival to ED.        EXTERNAL RECORDS REVIEWED  Other None    HPI/ROS  LIMITATION TO HISTORY   Select: Aphasia  OUTSIDE HISTORIAN(S):  Family sister-in-law    Zane Galeano is a 62 y.o. male who presents by EMS for airway obstruction.  Patient was reportedly eating dinner when he began choking on a piece of food.  He was having trouble breathing and he went to knock on his sister-in-law's door to alert her of the problem.  In route he had partial improvement of the food obstruction and then on arrival here in the ED he had full resolution of food obstruction witnessed by the nurse.  Patient is aphasic but is writing down answers to questions and is otherwise GCS 15.  He reports he is having no more difficulty breathing.  He is writing down details of his history as he is aphasic.  He reports he has a large stroke 2 years ago that did cause significant impairment of his speech.    Sister-in-law provides further history when she arrives and reports that about 2:30 PM today he had taken a nap while in the car on a road trip here.  He subsequently woke up and was not talking and was aphasic and had been drooling.  He continues with drooling until event where he was eating dinner that brought him in here.  She notes he was talking and acting normal prior to 2:30 PM.    PAST MEDICAL HISTORY   CVA 2 years ago, diabetes, HLD    SURGICAL HISTORY  patient denies any surgical history    FAMILY HISTORY  No family history on file.    SOCIAL HISTORY  Social History     Tobacco Use    Smoking status: Not on file    Smokeless tobacco: Not on file   Substance and Sexual Activity    Alcohol use: Not on  "file    Drug use: Not on file    Sexual activity: Not on file       CURRENT MEDICATIONS  Home Medications       Reviewed by Radha Sam (Pharmacy Tech) on 09/24/23 at 0211  Med List Status: Partial     Medication Last Dose Status   insulin glargine (LANTUS) 100 UNIT/ML SC SOLN 9/21/2023 Active                    ALLERGIES  Not on File    PHYSICAL EXAM  VITAL SIGNS: BP (!) 152/74   Pulse (!) 55   Temp 36.9 °C (98.4 °F)   Resp 20   Ht 1.803 m (5' 11\")   Wt 63.5 kg (140 lb)   SpO2 98%   BMI 19.53 kg/m²    Constitutional: Awake and alert. No acute distress  HEENT: Normal Conjunctiva.  No angioedema, no evidence of airway edema.  Patient is visibly drooling.  He writes down that it is painful to swallow  Neck: Grossly normal range of motion. Airway midline.  Cardiovascular: Normal heart rate, Normal rhythm.  Thorax & Lungs: No respiratory distress. Clear to Auscultation Bilaterally.  No stridor  Abdomen: Normal inspection. Normoactive Bowel sounds. Non tender. Nondistended  Skin: No obvious rash.  Back: No tenderness, No CVA tenderness.   Musculoskeletal: No obvious deformity. Moves all extremities Well.  Neurologic: A&Ox3.   Psychiatric: Mood and affect are appropriate for situation.    LABS  Results for orders placed or performed during the hospital encounter of 09/23/23   CBC WITH DIFFERENTIAL   Result Value Ref Range    WBC 6.9 4.8 - 10.8 K/uL    RBC 3.39 (L) 4.70 - 6.10 M/uL    Hemoglobin 9.3 (L) 14.0 - 18.0 g/dL    Hematocrit 29.4 (L) 42.0 - 52.0 %    MCV 86.7 81.4 - 97.8 fL    MCH 27.4 27.0 - 33.0 pg    MCHC 31.6 (L) 32.3 - 36.5 g/dL    RDW 44.5 35.9 - 50.0 fL    Platelet Count 254 164 - 446 K/uL    MPV 9.6 9.0 - 12.9 fL    Neutrophils-Polys 73.20 (H) 44.00 - 72.00 %    Lymphocytes 15.70 (L) 22.00 - 41.00 %    Monocytes 6.50 0.00 - 13.40 %    Eosinophils 3.60 0.00 - 6.90 %    Basophils 0.30 0.00 - 1.80 %    Immature Granulocytes 0.70 0.00 - 0.90 %    Nucleated RBC 0.00 0.00 - 0.20 /100 WBC    " Neutrophils (Absolute) 5.03 1.82 - 7.42 K/uL    Lymphs (Absolute) 1.08 1.00 - 4.80 K/uL    Monos (Absolute) 0.45 0.00 - 0.85 K/uL    Eos (Absolute) 0.25 0.00 - 0.51 K/uL    Baso (Absolute) 0.02 0.00 - 0.12 K/uL    Immature Granulocytes (abs) 0.05 0.00 - 0.11 K/uL    NRBC (Absolute) 0.00 K/uL   COMP METABOLIC PANEL   Result Value Ref Range    Sodium 140 135 - 145 mmol/L    Potassium 3.8 3.6 - 5.5 mmol/L    Chloride 107 96 - 112 mmol/L    Co2 22 20 - 33 mmol/L    Anion Gap 11.0 7.0 - 16.0    Glucose 261 (H) 65 - 99 mg/dL    Bun 23 (H) 8 - 22 mg/dL    Creatinine 1.26 0.50 - 1.40 mg/dL    Calcium 8.6 8.5 - 10.5 mg/dL    Correct Calcium 9.0 8.5 - 10.5 mg/dL    AST(SGOT) 25 12 - 45 U/L    ALT(SGPT) 69 (H) 2 - 50 U/L    Alkaline Phosphatase 239 (H) 30 - 99 U/L    Total Bilirubin 0.3 0.1 - 1.5 mg/dL    Albumin 3.5 3.2 - 4.9 g/dL    Total Protein 6.9 6.0 - 8.2 g/dL    Globulin 3.4 1.9 - 3.5 g/dL    A-G Ratio 1.0 g/dL   PT/INR   Result Value Ref Range    PT 14.2 12.0 - 14.6 sec    INR 1.09 0.87 - 1.13   PTT   Result Value Ref Range    APTT 24.6 (L) 24.7 - 36.0 sec   ESTIMATED GFR   Result Value Ref Range    GFR (CKD-EPI) 64 >60 mL/min/1.73 m 2   Hemoglobin A1C   Result Value Ref Range    Glycohemoglobin 7.5 (H) 4.0 - 5.6 %    Est Avg Glucose 169 mg/dL   Lipid Profile   Result Value Ref Range    Cholesterol,Tot 91 (L) 100 - 199 mg/dL    Triglycerides 79 0 - 149 mg/dL    HDL 27 (A) >=40 mg/dL    LDL 48 <100 mg/dL   POCT glucose device results   Result Value Ref Range    POC Glucose, Blood 194 (H) 65 - 99 mg/dL         RADIOLOGY  I have independently interpreted the diagnostic imaging associated with this visit and am waiting the final reading from the radiologist.   My preliminary interpretation is as follows: Chest x-ray with no opacity, no foreign body  CT head without intracranial hemorrhage  Radiologist interpretation:   CT-CTA NECK WITH & W/O-POST PROCESSING   Final Result      CT angiogram of the neck within normal  limits.      CT-CTA HEAD WITH & W/O-POST PROCESS   Final Result      1.  Occlusion of the M1 segments of the middle cerebral artery is noted bilaterally. These occlusions are likely chronic and collaterals are present as described above.      2.  No other occlusion or filling defect identified.      3.  These findings were discussed with DEVON MEJÍA on 09/24/2023.      CT-CEREBRAL PERFUSION ANALYSIS   Final Result      1.  Cerebral blood flow less than 30% likely representing completed infarct = 28 mL.      2.  T Max more than 6 seconds likely representing combination of completed infarct and ischemia = 44 mL.      3.  Mismatched volume likely representing ischemic brain/penumbra = 16 mL      4.  Please note that the cerebral perfusion was performed on the limited brain tissue around the basal ganglia region. Infarct/ischemia outside the CT perfusion sections can be missed in this study.      CT-HEAD W/O   Final Result         NO ACUTE ABNORMALITIES ARE NOTED ON CT SCAN OF THE HEAD.      Findings are consistent with atrophy.  Decreased attenuation in the periventricular white matter likely indicates microvascular ischemic disease.         DX-CHEST-PORTABLE (1 VIEW)   Final Result         1.  Possible scarring or atelectasis in the left lung base. Aspiration is also possible.      2.  No other infiltrates or consolidations identified.      MR-BRAIN-W/O    (Results Pending)   EC-ECHOCARDIOGRAM COMPLETE W/ CONT    (Results Pending)         COURSE & MEDICAL DECISION MAKING    ED Observation Status? No; Patient does not meet criteria for ED Observation.     INITIAL ASSESSMENT, COURSE AND PLAN  Care Narrative: 63-year-old male with prior stroke 2 years ago presents initially for airway obstruction that was resolved on arrival and then additionally for aphasia and drooling with last known normal 2:30 PM today.  Afebrile, hypertensive, no tachycardia no hypoxia  On exam he has no focal neurologic deficits and is  moving all extremities and ambulates with normal gait.  He is drooling but not in respiratory distress, no airway edema appreciated.  He is writing down very detailed history and answering my questions appropriately but is aphasic.  CT head CTA imaging was obtained with uncertain timeline of occlusion of the M1 bilaterally.  Neurology was consulted for evaluation and they discussed the case with interventional neurologist.  Patient is outside the window for TNK and no intervention is planned at this time.  Chest x-ray does not demonstrate acute finding.  He will be given aspirin 325 mg chewable for concern of new stroke.  Patient will be admitted for further management by hospitalist and MRI to evaluate for potential new stroke that occurred sometime today.  HTN/IDDM FOLLOW UP:  The patient has known hypertension and is being followed by their primary care doctor      ADDITIONAL PROBLEM LIST  DMII  HTN    DISPOSITION AND DISCUSSIONS  I have discussed management of the patient with the following physicians and GERA's:  Hospitalist, Neurology    Discussion of management with other Q or appropriate source(s): None     FINAL DIAGNOSIS  1. Aphasia Active   2. Drooling Active   3. Acute CVA (cerebrovascular accident) (HCC)           Electronically signed by: Kaylin Villarreal D.O., 9/23/2023 11:25 PM

## 2023-09-24 NOTE — H&P
"Hospital Medicine History & Physical Note    Date of Service  9/24/2023    Primary Care Physician  Pcp Pt States None    Consultants  neurology    Specialist Names: Dr. Guillen    Code Status  Full Code    Chief Complaint  Chief Complaint   Patient presents with    Difficulty Breathing     PT BIBA for a partial airway obstruction. Patient was eating at home and exerienced choking on a hamburger. Patient was able to dislodge some of the obstruction on EMS arrival. Patient dislodged what he felt was the rest of it on arrival to ED.        History of Presenting Illness  Zane Galeano is a 62 y.o. male who presented 9/23/2023 with aphasia.  This is a pleasant man who reports a past history of 1 \"massive stroke\" 2 years ago after which she had aphasia which improved, and 3 TIAs with the most recent one 6 months ago.  Also reports a history of A-fib not on any medications and does not give a clear reason for this, as well as diabetes and chronic kidney disease.  He presents due to sudden onset aphasia after waking up from a nap at around 230 yesterday afternoon.  Last known normal was around noon when he went to bed from a nap and woke up and was unable to speak.  Apparently after that they went out for dinner and he choked on a hamburger which is what brought him into the ED.  Patient was able to dislodge the hamburger.  Given his new onset aphasia work-up for stroke was initiated, CT perfusion scan did show a penumbra of 16 mL, and CTA of the head showed M1 occlusions bilaterally with collaterals.  Neurology was consulted, Dr. Guillen, did not recommend any emergent interventions, recommended admission for MRI and speech therapy and general stroke management.  Patient was given aspirin.    I discussed the plan of care with patient.    Review of Systems  Review of Systems   Constitutional:  Negative for chills and fever.   HENT:  Negative for congestion and sore throat.    Eyes:  Negative for blurred vision " and double vision.   Respiratory:  Negative for cough and shortness of breath.    Cardiovascular:  Negative for chest pain, palpitations and leg swelling.   Gastrointestinal:  Negative for heartburn and nausea.   Genitourinary:  Negative for dysuria and urgency.   Musculoskeletal:  Negative for myalgias and neck pain.   Neurological:  Positive for speech change. Negative for dizziness, focal weakness, weakness and headaches.       Past Medical History   has no past medical history on file.    Surgical History   has no past surgical history on file.     Family History  Unsure of family history is  Family history reviewed with patient. There is no family history that is pertinent to the chief complaint.     Social History       Allergies  Not on File    Medications  None       Physical Exam  Temp:  [36.9 °C (98.4 °F)] 36.9 °C (98.4 °F)  Pulse:  [59-77] 61  Resp:  [16-20] 20  BP: (116-185)/(74-90) 143/85  SpO2:  [96 %-98 %] 97 %  Blood Pressure: (!) 143/85   Temperature: 36.9 °C (98.4 °F)   Pulse: 61   Respiration: 20   Pulse Oximetry: 97 %       Physical Exam  Constitutional:       General: He is not in acute distress.     Appearance: He is not toxic-appearing.   HENT:      Head: Normocephalic and atraumatic.      Nose: Nose normal.      Mouth/Throat:      Mouth: Mucous membranes are moist.      Pharynx: Oropharynx is clear.      Comments: Copious drool  Eyes:      Extraocular Movements: Extraocular movements intact.      Conjunctiva/sclera: Conjunctivae normal.   Cardiovascular:      Rate and Rhythm: Normal rate and regular rhythm.      Pulses: Normal pulses.      Heart sounds: Normal heart sounds.   Pulmonary:      Effort: Pulmonary effort is normal.      Breath sounds: Normal breath sounds.   Abdominal:      General: Abdomen is flat.      Palpations: Abdomen is soft.   Musculoskeletal:         General: No swelling or deformity. Normal range of motion.      Cervical back: Normal range of motion and neck supple.  "  Skin:     General: Skin is warm and dry.   Neurological:      Sensory: No sensory deficit.      Motor: No weakness.      Comments: Aphasic, intact written language and comprehension, can produce some grunts  Tongue deviation to the left         Laboratory:  Recent Labs     09/23/23  2300   WBC 6.9   RBC 3.39*   HEMOGLOBIN 9.3*   HEMATOCRIT 29.4*   MCV 86.7   MCH 27.4   MCHC 31.6*   RDW 44.5   PLATELETCT 254   MPV 9.6     Recent Labs     09/23/23  2300   SODIUM 140   POTASSIUM 3.8   CHLORIDE 107   CO2 22   GLUCOSE 261*   BUN 23*   CREATININE 1.26   CALCIUM 8.6     Recent Labs     09/23/23  2300   ALTSGPT 69*   ASTSGOT 25   ALKPHOSPHAT 239*   TBILIRUBIN 0.3   GLUCOSE 261*     Recent Labs     09/24/23  0023   APTT 24.6*   INR 1.09     No results for input(s): \"NTPROBNP\" in the last 72 hours.  Recent Labs     09/23/23 2300   TRIGLYCERIDE 79   HDL 27*   LDL 48     No results for input(s): \"TROPONINT\" in the last 72 hours.    Imaging:  CT-CTA NECK WITH & W/O-POST PROCESSING   Final Result      CT angiogram of the neck within normal limits.      CT-CTA HEAD WITH & W/O-POST PROCESS   Final Result      1.  Occlusion of the M1 segments of the middle cerebral artery is noted bilaterally. These occlusions are likely chronic and collaterals are present as described above.      2.  No other occlusion or filling defect identified.      3.  These findings were discussed with DEVON MEJÍA on 09/24/2023.      CT-CEREBRAL PERFUSION ANALYSIS   Final Result      1.  Cerebral blood flow less than 30% likely representing completed infarct = 28 mL.      2.  T Max more than 6 seconds likely representing combination of completed infarct and ischemia = 44 mL.      3.  Mismatched volume likely representing ischemic brain/penumbra = 16 mL      4.  Please note that the cerebral perfusion was performed on the limited brain tissue around the basal ganglia region. Infarct/ischemia outside the CT perfusion sections can be missed in this " study.      CT-HEAD W/O   Final Result         NO ACUTE ABNORMALITIES ARE NOTED ON CT SCAN OF THE HEAD.      Findings are consistent with atrophy.  Decreased attenuation in the periventricular white matter likely indicates microvascular ischemic disease.         DX-CHEST-PORTABLE (1 VIEW)   Final Result         1.  Possible scarring or atelectasis in the left lung base. Aspiration is also possible.      2.  No other infiltrates or consolidations identified.      MR-BRAIN-W/O    (Results Pending)   EC-ECHOCARDIOGRAM COMPLETE W/ CONT    (Results Pending)         Assessment/Plan:  Justification for Admission Status  I anticipate this patient will require at least two midnights for appropriate medical management, necessitating inpatient admission because acute CVA    Patient will need a Med/Surg bed on EMERGENCY service .  The need is secondary to above.    * Acute CVA (cerebrovascular accident) (HCC)- (present on admission)  Assessment & Plan  With complete expressive aphasia.  Patient has entirely preserved written language fluency and comprehension.  History of 1 stroke and 3 TIAs per patient, stroke 2 years ago left him with aphasia that improved back to normal apparently.  Neurology consulted, Dr. Guillen, did not recommend any emergent interventions, admit for further work-up of stroke management  -Admit to neuro, telemetry monitoring  -MRI brain -patient likely unable to tolerate lying flat due to inability to manage his secretions.  Discussed with radiology, no easy solution of this.  I have ordered scopolamine patch to help decrease secretions, unsure if this will be adequate on its own, may need to try atropine  -Strict n.p.o., SLP  -Echo with bubble study  -Aspirin, statin  -Lipid panel, A1c  -PT/OT    AF (atrial fibrillation) ([facility])  Assessment & Plan  Patient reports history of A-fib.  Not on any medications for this, anticoagulation nor rate control meds.  Unclear reason why, he denies history of  bleeding.  I do not understand the explanation he writes down.  His brother's partner who is with your is planning on bringing in records from his previous hospital stay, though now it appears at least partial records have been loaded into care everywhere, previously not available.     Currently appears to be in sinus rhythm on monitor, awaiting EKG  Here with acute CVA, possibly thromboembolic given his A-fib not on anticoagulation  Start anticoagulation when appropriate, holding off right now in setting of acute CVA    Type 2 diabetes mellitus with circulatory disorder, without long-term current use of insulin ([facility])  Assessment & Plan  Check A1c  Insulin basal plus correctional        VTE prophylaxis: SCDs/TEDs

## 2023-09-24 NOTE — ED NOTES
Med Rec PARTIAL per Pt and family at bedside.Pt reports there are other medications he was taking but has been out of some for months. Pt's family reports that she will be able to obtain RX bottles for information at a later time.

## 2023-09-24 NOTE — ASSESSMENT & PLAN NOTE
"With aphemia. Unable to form words, however full comprehension and preserved written language fluency.  History of 1 stroke and 3 TIAs per patient, stroke 2 years ago left him with aphasia that improved back to normal apparently.  MRI showed Small areas of acute infarcts in the left parietal lobe and signs concerning for [place]  Echo showed normal LVEF and RVEF with mild MR  -Goal normotension.   -PT/OT/SLP  -Eliquis and statin  -Neurology consulted, recommend follow up with Neuro-IR within 2-3 weeks for diagnostic angiogram to identify potential grafts for EDAS following this will need follow up with Dr. Emili Allison with [facility] within 4-6 weeks to undergo indirect bypass (EDAS)\"    Qunatiferon pending  D/c to recuperative home.  "

## 2023-09-24 NOTE — ED NOTES
Patient medicated per MAR. This RN rounded on pt. Pt resting comfortably in bed at this time. On vitals monitor. Respirations equal and unlabored. Vitals signs stable. No acute distress at this time. Call light within reach.

## 2023-09-24 NOTE — ASSESSMENT & PLAN NOTE
"Patient reports history of A-fib.  Not on any medications for this  -Continue Eliquis\"    Vitals:    10/03/23 1735   BP:    Pulse:    Resp: 16   Temp:    SpO2:      HR stable.  Monitor renal function, bleeding and melena as patient on Eliquis  "

## 2023-09-24 NOTE — ED NOTES
Bedside report from John RN, assumed care done. On RA, No signs of distress. Pt aphasic. Communicates using pen and paper. Able to do self suctioning of oral secretions. Call light within reach. Urinal at bedside. Fall precaution placed.

## 2023-09-24 NOTE — ED TRIAGE NOTES
"Chief Complaint   Patient presents with    Difficulty Breathing     PT BIBA for a partial airway obstruction. Patient was eating at home and exerienced choking on a hamburger. Patient was able to dislodge some of the obstruction on EMS arrival. Patient dislodged what he felt was the rest of it on arrival to ED.        BIB EMS to [facility], pt on monitor and in gown, labs drawn and sent. Pt consists of: Airway obstruction. Patient has hx of stroke.    Medications given en route:None    BP (!) 185/88   Pulse 77   Resp 16   Ht 1.803 m (5' 11\")   Wt 63.5 kg (140 lb)   SpO2 98%   BMI 19.53 kg/m²   "

## 2023-09-25 ENCOUNTER — APPOINTMENT (OUTPATIENT)
Dept: RADIOLOGY | Facility: MEDICAL CENTER | Age: 63
DRG: 065 | End: 2023-09-25
Attending: STUDENT IN AN ORGANIZED HEALTH CARE EDUCATION/TRAINING PROGRAM
Payer: COMMERCIAL

## 2023-09-25 ENCOUNTER — APPOINTMENT (OUTPATIENT)
Dept: CARDIOLOGY | Facility: MEDICAL CENTER | Age: 63
DRG: 065 | End: 2023-09-25
Payer: COMMERCIAL

## 2023-09-25 LAB
ALBUMIN SERPL BCP-MCNC: 3 G/DL (ref 3.2–4.9)
ALBUMIN/GLOB SERPL: 0.8 G/DL
ALP SERPL-CCNC: 182 U/L (ref 30–99)
ALT SERPL-CCNC: 46 U/L (ref 2–50)
ANION GAP SERPL CALC-SCNC: 12 MMOL/L (ref 7–16)
AST SERPL-CCNC: 12 U/L (ref 12–45)
BASOPHILS # BLD AUTO: 0.7 % (ref 0–1.8)
BASOPHILS # BLD: 0.05 K/UL (ref 0–0.12)
BILIRUB SERPL-MCNC: 0.3 MG/DL (ref 0.1–1.5)
BUN SERPL-MCNC: 13 MG/DL (ref 8–22)
CALCIUM ALBUM COR SERPL-MCNC: 9.1 MG/DL (ref 8.5–10.5)
CALCIUM SERPL-MCNC: 8.3 MG/DL (ref 8.5–10.5)
CHLORIDE SERPL-SCNC: 108 MMOL/L (ref 96–112)
CO2 SERPL-SCNC: 21 MMOL/L (ref 20–33)
CREAT SERPL-MCNC: 0.94 MG/DL (ref 0.5–1.4)
EKG IMPRESSION: NORMAL
EOSINOPHIL # BLD AUTO: 0.42 K/UL (ref 0–0.51)
EOSINOPHIL NFR BLD: 6.2 % (ref 0–6.9)
ERYTHROCYTE [DISTWIDTH] IN BLOOD BY AUTOMATED COUNT: 43.4 FL (ref 35.9–50)
GFR SERPLBLD CREATININE-BSD FMLA CKD-EPI: 91 ML/MIN/1.73 M 2
GLOBULIN SER CALC-MCNC: 3.6 G/DL (ref 1.9–3.5)
GLUCOSE BLD STRIP.AUTO-MCNC: 136 MG/DL (ref 65–99)
GLUCOSE BLD STRIP.AUTO-MCNC: 171 MG/DL (ref 65–99)
GLUCOSE SERPL-MCNC: 169 MG/DL (ref 65–99)
HCT VFR BLD AUTO: 28.9 % (ref 42–52)
HGB BLD-MCNC: 9.6 G/DL (ref 14–18)
IMM GRANULOCYTES # BLD AUTO: 0.06 K/UL (ref 0–0.11)
IMM GRANULOCYTES NFR BLD AUTO: 0.9 % (ref 0–0.9)
LV EJECT FRACT  99904: 55
LYMPHOCYTES # BLD AUTO: 1.47 K/UL (ref 1–4.8)
LYMPHOCYTES NFR BLD: 21.7 % (ref 22–41)
MAGNESIUM SERPL-MCNC: 1.5 MG/DL (ref 1.5–2.5)
MCH RBC QN AUTO: 27.9 PG (ref 27–33)
MCHC RBC AUTO-ENTMCNC: 33.2 G/DL (ref 32.3–36.5)
MCV RBC AUTO: 84 FL (ref 81.4–97.8)
MONOCYTES # BLD AUTO: 0.52 K/UL (ref 0–0.85)
MONOCYTES NFR BLD AUTO: 7.7 % (ref 0–13.4)
NEUTROPHILS # BLD AUTO: 4.26 K/UL (ref 1.82–7.42)
NEUTROPHILS NFR BLD: 62.8 % (ref 44–72)
NRBC # BLD AUTO: 0 K/UL
NRBC BLD-RTO: 0 /100 WBC (ref 0–0.2)
PLATELET # BLD AUTO: 245 K/UL (ref 164–446)
PMV BLD AUTO: 9.1 FL (ref 9–12.9)
POTASSIUM SERPL-SCNC: 3.4 MMOL/L (ref 3.6–5.5)
PROT SERPL-MCNC: 6.6 G/DL (ref 6–8.2)
RBC # BLD AUTO: 3.44 M/UL (ref 4.7–6.1)
SODIUM SERPL-SCNC: 141 MMOL/L (ref 135–145)
WBC # BLD AUTO: 6.8 K/UL (ref 4.8–10.8)

## 2023-09-25 PROCEDURE — A9270 NON-COVERED ITEM OR SERVICE: HCPCS

## 2023-09-25 PROCEDURE — A9270 NON-COVERED ITEM OR SERVICE: HCPCS | Mod: JZ | Performed by: HOSPITALIST

## 2023-09-25 PROCEDURE — 82962 GLUCOSE BLOOD TEST: CPT | Mod: 91

## 2023-09-25 PROCEDURE — 70551 MRI BRAIN STEM W/O DYE: CPT

## 2023-09-25 PROCEDURE — 83735 ASSAY OF MAGNESIUM: CPT

## 2023-09-25 PROCEDURE — A9270 NON-COVERED ITEM OR SERVICE: HCPCS | Performed by: STUDENT IN AN ORGANIZED HEALTH CARE EDUCATION/TRAINING PROGRAM

## 2023-09-25 PROCEDURE — 36415 COLL VENOUS BLD VENIPUNCTURE: CPT

## 2023-09-25 PROCEDURE — 700102 HCHG RX REV CODE 250 W/ 637 OVERRIDE(OP)

## 2023-09-25 PROCEDURE — 93306 TTE W/DOPPLER COMPLETE: CPT

## 2023-09-25 PROCEDURE — 92526 ORAL FUNCTION THERAPY: CPT

## 2023-09-25 PROCEDURE — 770020 HCHG ROOM/CARE - TELE (206)

## 2023-09-25 PROCEDURE — 96105 ASSESSMENT OF APHASIA: CPT

## 2023-09-25 PROCEDURE — 700102 HCHG RX REV CODE 250 W/ 637 OVERRIDE(OP): Mod: JZ | Performed by: HOSPITALIST

## 2023-09-25 PROCEDURE — 700102 HCHG RX REV CODE 250 W/ 637 OVERRIDE(OP): Performed by: STUDENT IN AN ORGANIZED HEALTH CARE EDUCATION/TRAINING PROGRAM

## 2023-09-25 PROCEDURE — 93005 ELECTROCARDIOGRAM TRACING: CPT | Performed by: STUDENT IN AN ORGANIZED HEALTH CARE EDUCATION/TRAINING PROGRAM

## 2023-09-25 PROCEDURE — 80053 COMPREHEN METABOLIC PANEL: CPT

## 2023-09-25 PROCEDURE — 99233 SBSQ HOSP IP/OBS HIGH 50: CPT | Performed by: PSYCHIATRY & NEUROLOGY

## 2023-09-25 PROCEDURE — 85025 COMPLETE CBC W/AUTO DIFF WBC: CPT

## 2023-09-25 PROCEDURE — 700111 HCHG RX REV CODE 636 W/ 250 OVERRIDE (IP): Mod: JZ | Performed by: HOSPITALIST

## 2023-09-25 PROCEDURE — 93306 TTE W/DOPPLER COMPLETE: CPT | Mod: 26 | Performed by: INTERNAL MEDICINE

## 2023-09-25 RX ORDER — POTASSIUM CHLORIDE 7.45 MG/ML
10 INJECTION INTRAVENOUS
Status: DISPENSED | OUTPATIENT
Start: 2023-09-25 | End: 2023-09-25

## 2023-09-25 RX ORDER — MAGNESIUM SULFATE HEPTAHYDRATE 40 MG/ML
2 INJECTION, SOLUTION INTRAVENOUS ONCE
Status: COMPLETED | OUTPATIENT
Start: 2023-09-25 | End: 2023-09-25

## 2023-09-25 RX ORDER — POTASSIUM CHLORIDE 20 MEQ/1
40 TABLET, EXTENDED RELEASE ORAL ONCE
Status: COMPLETED | OUTPATIENT
Start: 2023-09-25 | End: 2023-09-25

## 2023-09-25 RX ORDER — UREA 10 %
5 LOTION (ML) TOPICAL ONCE
Status: COMPLETED | OUTPATIENT
Start: 2023-09-25 | End: 2023-09-25

## 2023-09-25 RX ADMIN — OXYCODONE HYDROCHLORIDE 5 MG: 5 TABLET ORAL at 21:52

## 2023-09-25 RX ADMIN — OXYCODONE HYDROCHLORIDE 5 MG: 5 TABLET ORAL at 04:58

## 2023-09-25 RX ADMIN — AMLODIPINE BESYLATE 10 MG: 5 TABLET ORAL at 05:09

## 2023-09-25 RX ADMIN — Medication 5 MG: at 21:39

## 2023-09-25 RX ADMIN — ASPIRIN 81 MG 81 MG: 81 TABLET ORAL at 05:10

## 2023-09-25 RX ADMIN — APIXABAN 5 MG: 5 TABLET, FILM COATED ORAL at 18:05

## 2023-09-25 RX ADMIN — MAGNESIUM SULFATE HEPTAHYDRATE 2 G: 2 INJECTION, SOLUTION INTRAVENOUS at 18:10

## 2023-09-25 RX ADMIN — POTASSIUM CHLORIDE 40 MEQ: 1500 TABLET, EXTENDED RELEASE ORAL at 18:14

## 2023-09-25 RX ADMIN — INSULIN LISPRO 1 UNITS: 100 INJECTION, SOLUTION INTRAVENOUS; SUBCUTANEOUS at 17:59

## 2023-09-25 RX ADMIN — ATROPINE SULFATE 2 DROP: 10 SOLUTION/ DROPS OPHTHALMIC at 03:28

## 2023-09-25 RX ADMIN — INSULIN GLARGINE-YFGN 10 UNITS: 100 INJECTION, SOLUTION SUBCUTANEOUS at 17:58

## 2023-09-25 RX ADMIN — ATORVASTATIN CALCIUM 40 MG: 40 TABLET, FILM COATED ORAL at 18:05

## 2023-09-25 ASSESSMENT — ENCOUNTER SYMPTOMS
NAUSEA: 0
DOUBLE VISION: 0
NECK PAIN: 0
MYALGIAS: 0
BLURRED VISION: 0
SPEECH CHANGE: 1
PALPITATIONS: 0
WEAKNESS: 0
CHILLS: 0
SHORTNESS OF BREATH: 0
FEVER: 0
SORE THROAT: 0
HEADACHES: 0
FOCAL WEAKNESS: 0
COUGH: 0
HEARTBURN: 0
DIZZINESS: 0

## 2023-09-25 ASSESSMENT — COGNITIVE AND FUNCTIONAL STATUS - GENERAL
SUGGESTED CMS G CODE MODIFIER MOBILITY: CJ
DAILY ACTIVITIY SCORE: 22
SUGGESTED CMS G CODE MODIFIER DAILY ACTIVITY: CJ
CLIMB 3 TO 5 STEPS WITH RAILING: A LITTLE
WALKING IN HOSPITAL ROOM: A LITTLE
TOILETING: A LITTLE
MOBILITY SCORE: 22
HELP NEEDED FOR BATHING: A LITTLE

## 2023-09-25 ASSESSMENT — CHA2DS2 SCORE
CHA2DS2 VASC SCORE: 5
AGE 65 TO 74: NO
HYPERTENSION: YES
DIABETES: YES
SEX: MALE
PRIOR STROKE OR TIA OR THROMBOEMBOLISM: YES
CHF OR LEFT VENTRICULAR DYSFUNCTION: NO
AGE 75 OR GREATER: NO
VASCULAR DISEASE: YES

## 2023-09-25 ASSESSMENT — PAIN DESCRIPTION - PAIN TYPE
TYPE: ACUTE PAIN

## 2023-09-25 NOTE — ED NOTES
Assumed care of patient, bedside report received from LISSET Clarke.  Introduced self as pt RN, POC discussed, call light in reach, pt on room air at this time. Fall risk interventions in place, fall risk sign on door and bed locked and in lowest position.

## 2023-09-25 NOTE — ED NOTES
RN helped pt with remote, pt watching tv with even chest rise and fall, reports no needs at this time, call light available and in reach.

## 2023-09-25 NOTE — PROGRESS NOTES
"Hospital Medicine Daily Progress Note    Date of Service  9/24/2023    Chief Complaint  Zane Galeano is a 63 y.o. male admitted 9/23/2023 with aphasia    Hospital Course  Zane Galeano is a 62 y.o. male who presented 9/23/2023 with aphasia.  This is a pleasant man who reports a past history of 1 \"massive stroke\" 2 years ago after which she had aphasia which improved, and 3 TIAs with the most recent one 6 months ago.  Also reports a history of A-fib not on any medications and does not give a clear reason for this, as well as diabetes and chronic kidney disease.      He presents due to sudden onset aphasia after waking up from a nap at around 230 yesterday afternoon.  Last known normal was around noon when he went to bed from a nap and woke up and was unable to speak.  Apparently after that they went out for dinner and he choked on a hamburger which is what brought him into the ED.  Patient was able to dislodge the hamburger.      Given his new onset aphasia work-up for stroke was initiated, CT perfusion scan did show a penumbra of 16 mL, and CTA of the head showed M1 occlusions bilaterally with collaterals.  Neurology was consulted, Dr. Guillen, did not recommend any emergent interventions, recommended admission for MRI and speech therapy and general stroke management.  Patient was given aspirin.    Interval Problem Update  9/24/2023: Patient able to comprehend spoken language and fluently reply in writing.   Discussed with Dr. Guillen. Goal normotension. This morning he is bradycardic and pending speech eval, will start PRN hydralazine for SBP >150. When cleared will restart home amlodipine. If heart rate recovers, will add labetalol as 2nd option.   Secretion control with scopolamine patch inadequate, will add atropine.   PT/OT/SLP  Echo bubble study, MRI  Transfer to neuro floor        I have discussed this patient's plan of care and discharge plan at IDT rounds today with Case Management, Nursing, " Nursing leadership, and other members of the IDT team.    Consultants/Specialty  Vascular neurology    Code Status  Full Code    Disposition  The patient is not medically cleared for discharge to home or a post-acute facility.      I have placed the appropriate orders for post-discharge needs.    Review of Systems  Review of Systems   Constitutional:  Negative for chills and fever.   HENT:  Negative for congestion and sore throat.    Eyes:  Negative for blurred vision and double vision.   Respiratory:  Negative for cough and shortness of breath.    Cardiovascular:  Negative for chest pain, palpitations and leg swelling.   Gastrointestinal:  Negative for heartburn and nausea.   Genitourinary:  Negative for dysuria and urgency.   Musculoskeletal:  Negative for myalgias and neck pain.   Neurological:  Positive for speech change. Negative for dizziness, focal weakness, weakness and headaches.        Physical Exam  Temp:  [36.9 °C (98.4 °F)] 36.9 °C (98.4 °F)  Pulse:  [50-97] 55  Resp:  [12-50] 12  BP: (110-187)/(55-90) 121/58  SpO2:  [93 %-99 %] 97 %    Physical Exam  Constitutional:       General: He is not in acute distress.     Appearance: He is not toxic-appearing.   HENT:      Head: Normocephalic and atraumatic.      Nose: Nose normal.      Mouth/Throat:      Mouth: Mucous membranes are moist.      Pharynx: Oropharynx is clear.      Comments: drooling  Eyes:      Extraocular Movements: Extraocular movements intact.      Conjunctiva/sclera: Conjunctivae normal.   Cardiovascular:      Rate and Rhythm: Normal rate and regular rhythm.      Pulses: Normal pulses.      Heart sounds: Normal heart sounds.   Pulmonary:      Effort: Pulmonary effort is normal.      Breath sounds: Normal breath sounds.   Abdominal:      General: Abdomen is flat.      Palpations: Abdomen is soft.   Musculoskeletal:         General: No swelling or deformity. Normal range of motion.      Cervical back: Normal range of motion and neck supple.    Skin:     General: Skin is warm and dry.   Neurological:      Mental Status: He is oriented to person, place, and time.      Sensory: No sensory deficit.      Motor: No weakness.      Comments: Cannot speak words. Can make some noises. Fully intact comprehension and produces fluent written language. Left facial droop, tongue deviation to the left.          Fluids  No intake or output data in the 24 hours ending 09/24/23 2027    Laboratory  Recent Labs     09/23/23  2300   WBC 6.9   RBC 3.39*   HEMOGLOBIN 9.3*   HEMATOCRIT 29.4*   MCV 86.7   MCH 27.4   MCHC 31.6*   RDW 44.5   PLATELETCT 254   MPV 9.6     Recent Labs     09/23/23  2300   SODIUM 140   POTASSIUM 3.8   CHLORIDE 107   CO2 22   GLUCOSE 261*   BUN 23*   CREATININE 1.26   CALCIUM 8.6     Recent Labs     09/24/23  0023   APTT 24.6*   INR 1.09         Recent Labs     09/23/23  2300   TRIGLYCERIDE 79   HDL 27*   LDL 48       Imaging  CT-CTA NECK WITH & W/O-POST PROCESSING   Final Result      CT angiogram of the neck within normal limits.      CT-CTA HEAD WITH & W/O-POST PROCESS   Final Result      1.  Occlusion of the M1 segments of the middle cerebral artery is noted bilaterally. These occlusions are likely chronic and collaterals are present as described above.      2.  No other occlusion or filling defect identified.      3.  These findings were discussed with DEVON MEJÍA on 09/24/2023.      CT-CEREBRAL PERFUSION ANALYSIS   Final Result      1.  Cerebral blood flow less than 30% likely representing completed infarct = 28 mL.      2.  T Max more than 6 seconds likely representing combination of completed infarct and ischemia = 44 mL.      3.  Mismatched volume likely representing ischemic brain/penumbra = 16 mL      4.  Please note that the cerebral perfusion was performed on the limited brain tissue around the basal ganglia region. Infarct/ischemia outside the CT perfusion sections can be missed in this study.      CT-HEAD W/O   Final Result          NO ACUTE ABNORMALITIES ARE NOTED ON CT SCAN OF THE HEAD.      Findings are consistent with atrophy.  Decreased attenuation in the periventricular white matter likely indicates microvascular ischemic disease.         DX-CHEST-PORTABLE (1 VIEW)   Final Result         1.  Possible scarring or atelectasis in the left lung base. Aspiration is also possible.      2.  No other infiltrates or consolidations identified.      MR-BRAIN-W/O    (Results Pending)   DX-ESOPHAGUS - [id number]    (Results Pending)   EC-ECHOCARDIOGRAM COMPLETE W/ CONT    (Results Pending)        Assessment/Plan  * Acute CVA (cerebrovascular accident) (HCC)- (present on admission)  Assessment & Plan  With aphemia. Unable to form words, however full comprehension and preserved written language fluency.  History of 1 stroke and 3 TIAs per patient, stroke 2 years ago left him with aphasia that improved back to normal apparently.  Dr. Guillen with vascular neurology is following    Goal normotension.     -Admit to neuro, telemetry monitoring  -MRI brain - may be difficult to manage his own secretions during scan. Scopolamine patch did not give full relieve, will try atropine.   -Echo with bubble study  -PT/OT  -NPO until cleared by SLP  -Aspirin, statin  -Lipid panel, A1c    Hypertension  Assessment & Plan  Goal normotension  Restart home amlodipine  Add PRN labetalol, with hydralazine as backup if HR low    AF (atrial fibrillation) ([facility])  Assessment & Plan  Patient reports history of A-fib.  Not on any medications for this, anticoagulation nor rate control meds.  Unclear reason why, he denies history of bleeding.  I do not understand the explanation he writes down.  His brother's partner who is with your is planning on bringing in records from his previous hospital stay, though now it appears at least partial records have been loaded into care everywhere, previously not available.     Currently appears to be in sinus rhythm on monitor,  awaiting EKG  Here with acute CVA, possibly thromboembolic given his A-fib not on anticoagulation  Start anticoagulation when appropriate, holding off right now in setting of acute CVA    Type 2 diabetes mellitus with circulatory disorder, without long-term current use of insulin (HCC)  Assessment & Plan  Check A1c  Insulin basal plus correctional      VTE prophylaxis:   SCDs/TEDs      I have performed a physical exam and reviewed and updated ROS and Plan today (9/24/2023). In review of yesterday's note (9/23/2023), there are no changes except as documented above.

## 2023-09-25 NOTE — THERAPY
"Speech Language Pathology   Communication Evaluation     Patient Name: Zane Galeano  AGE:  63 y.o., SEX:  male  Medical Record #: [id number]  Date of Service: 9/25/2023      History of Present Illness  61 y/o male presented 9/23 with aphasia. Pt reports a \"massive stroke\" two years ago and several TIAs since, as recent as six mo ago.     No hx SLP available in Epic; unclear report of previous SLP service from pt.     CXR 9/23:  \"1.  Possible scarring or atelectasis in the left lung base. Aspiration is also possible.  2.  No other infiltrates or consolidations identified.\"    MRI Brain 9/25:   1.  Small areas of acute infarcts in the left parietal lobe.  2.  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 The blood vessels better evaluated on the recent the CT angiogram.  3.  Chronic infarct in the right temporal and parietal lobes.  4.  Mild chronic microvascular ischemic disease.  5.  Mild cerebral volume loss.    General Information  Vitals  O2 Delivery Device: None - Room Air  Level of Consciousness: Alert, Awake  Patient Behaviors:  (pleasant;cooperative)  Orientation: Oriented x 4  Follows Directives: Yes    Prior Living Situation & Level of Function  Prior Services: Home-Independent  Lives with - Patient's Self Care Capacity: Alone and Able to Care For Self  Comments: Pt endorsed that he currently lives alone, however, has plans to move in with his brother/sister in law and rent a room from them in [place].  Communication: Pt with previous CVA, endorsed working with SLP in the hospital to improve intelligibility/word finding deficits. Pt endorsed recovery of function since.  Swallowing: Previous CVA, reports recovery of function since. Said med team was \"worried about his swallow\" after his last stroke but reports he did not have modified diet or dx evaluation completed. Reports he was eating RG/TN until a few days ago when he had a choking episode with a " "efrem.     Subjective  Pt was cleared by RN for aphasia evaluation. Pt was agreeable and cooperative with SLP tasks. Pt utilizing yes/no questions, encoding information, and some verbal output to provide information on PLOF to SLP. \"Please help me talk.\"    Communication Domain(s)  Expressive Language: Moderate  Receptive Language: Mild  Reading: Mild  Writing: Moderate   Motor Speech: Severe - estimated to be about 30% intelligible at the conversational level      Assessment    Western Aphasia Battery Revised Beside Version:   The Western Aphasia Battery-Revised (WAB-R) Beside Version was administered. The “bedside” WAB-R is a shortened version of the Western Aphasia Battery--Revised.  The battery is used to assess a patient’s language function following stroke, dementia, or other acquired neurologic disorder.  The results of the battery provide diagnostic information as to the presence, severity, and type of aphasia. It is designed to be administered at a patient’s bedside, when there are time constraints, issues of cooperation, or comorbidities that prevent more comprehensive testing.  The following results were obtained:    Spontaneous Speech  Content: 6/10  Fluency: 4/10  Auditory Verbal Comprehension  Yes/No Questions: /10  Sequential Commands: 5/10  Repetition: 9/10  Object Namin.5 /10    Bedside Aphasia Score: 69.2/100    The Bedside Aphasia Score is an essential summary value of an individual's aphasic deficit. It is proportional to the severity of aphasia regardless of the type or etiology. The aphasia quotient is useful in assisting and distinguishing between aphasic and non-aphasic patients. An AQ of 0-25 is very severe; an AQ of 26-50 is severe; an AQ of 51-75 is moderate; and an AQ of 76 and above is mild. The patient may be considered normal or not aphasic if the aphasia quotient is 93.8 or above.     Readin/10 -difficult to discern error vs omission d/t severe dysarthria of speech "   Writin/10    Bedside Language Score: 68.5/100    The Bedside Language Quotient (LQ) combines oral and written language scores to emphasize the communicative importance and the relationship between the two modalities.    Bedside Aphasia Classification: Transcortical Motor    Clinical Impressions  Pt is presenting with severe dysarthria and moderate aphasia- most closely aligned with transcortical motor subtype. Areas of relative strength including: auditory/verbal comprehension, repetition, object naming, and writing. Areas of weakness including: content, spontaneous speech, following sequential commands, and reading. It is important to note deficits in reading and at the conversational level are difficult to discern semantic/phonemic paraphasias/word-finding deficits d/t severe dysarthria of speech. Pt will benefit from language tx in the acute setting/next level of care. Pt will benefit from direct supervision with IADL activities upon dc d/t deficits in both receptive/expressive language abilities. Thank you.     NOTE: It is not within the scope of practice of Speech-Language Pathologists to determine patient capacity. Please defer to the physician or psych to complete this assessment.     Recommendations  Supervision Needs Upons Discharge: Direct assistance with IADLs (see below)  IADLs: Medication management, Financial management, Appointment management     SLP Treatment Plan  Treatment Plan: Dysphagia Treatment, Speech-Language Treatment, Patient/Family/Caregiver Training  SLP Frequency: 4x Per Week  Estimated Duration: Until Therapy Goals Met    Anticipated Discharge Needs  Discharge Recommendations: Recommend post-acute placement for additional speech therapy services prior to discharge home  Therapy Recommendations Upon DC: Dysphagia Training, Expression Training, Comprehension Training, Writing Training, Patient / Family / Caregiver Education, Community Re-Integration    Patient / Family  "Goals  Patient / Family Goal #1: \"I can't control liquids.\"  Goal #1 Outcome: Progressing as expected  Short Term Goal # 1: Pt will participate in MBSS w SLP to further evaluate swallow function and inform POC.  Goal Outcome # 1: Goal met, new goal added  Short Term Goal # 1 B : Pt will consume diet of PU/TN given 1:1 spv and strict strategy use with no worsening of respiratory status.  Goal Outcome  # 1 B: Progressing slower than expected  Short Term Goal # 2: Pt will complete exercises targeting BOT retraction, epiglottic inversion, LVC, hyolaryngeal movement, and pharyngeal constriction x30 in a session given min cues from SLP with \"good\" accuracy.  Goal Outcome # 2 : Progressing slower than expected  Short Term Goal # 3: Utilizing a total communication approach, pt will express wants/needs independently with >90% accuracy.  Short Term Goal # 4: With MOD cues, pt will utilize 'clear speech' techniques to increase intelligibility at the sentence level to >65%.  Short Term Goal # 5: With MOD cues, pt will follow complex 2-3 step commands in order to participate in functional ADL/iADL activities with >75% accuracy.    Carmita Pablo, SLP  "

## 2023-09-25 NOTE — DISCHARGE PLANNING
[facility] Transitional Care Coordination    Referral from: Dr. Barrett, Dr. Brice  Insurance Provider on Facesheet: [employer] HMO  Potential Rehab diagnosis: Stroke    Chart review indicates patient has ongoing medical management and may have therapy needs to possibly meet inpatient rehab facility criteria with the goal of returning to community.      D/C Support:  Chart notes reflect patient is homeless - (Dr. Guillen III filed 9:20, 9/24/23.)      Physiatry consult pended while waiting for additional information.  Workup ongoing, PT/OT pending as clinically appropriate.  TCC will follow.  Please reach out sooner if PMR consult requested for medical management.     Last Covid test:    Thank you for the referral.

## 2023-09-25 NOTE — ED NOTES
Pt states he lost his 2nd phone. MRI denies seeing a 2nd phone belonging to the pt. One black touch screen cell phone noted at bedside. Pt also reports losing his sandals on Saturday. Pt denied to look through own backpack in room.

## 2023-09-25 NOTE — ED NOTES
Assumed care of patient, bedside report received from off coming RNRenea.  Introduced self to pt as RN, POC discussed, call light in reach, pt on room air, and bed is low and locked.

## 2023-09-25 NOTE — ED NOTES
PT medicated per MAR, tolerated well. Pt linens reposition and pt resting with even chest rise and fall, reports no needs at this time, call light available and in reach.

## 2023-09-25 NOTE — THERAPY
"Speech Language Pathology   Daily Treatment     Patient Name: Zane Galeano  AGE:  63 y.o., SEX:  male  Medical Record #: [id number]  Date of Service: 9/25/2023    Precautions: Fall Risk, Swallow Precautions     Subjective  Pt was cleared by RN for dysphagia management. Pt was received awake, alert, and sitting at EOB. Thin liquids w/ straws present at bedside. SLP removed straws and reviewed need for 1:1 supervision by RN during PO intake. Pt verbalized understanding. Pt w/ recall of participation in study. Continuing to endorse difficulty managing secretions- noted to spill anteriorly from the oral cavity/utilize oral suction throughout tx. Pt was agreeable to SLP tx, however, endorsing fatigue d/t previously conducted language eval.     Assessment  SLP reviewed results of MBSS with pt including impaired swallow efficiency and airway protection. Additionally, SLP reviewed compensatory strategies. Pt managed sips of TN0 completed. Pt appreciated to tilt head back despite moderate cues. SLP reviewed need for neutral/downward facing head positioning. With continuous cueing, pt was able to modulate. Anterior loss of the bolus appreciated x2. Pt appeared to sensate evidenced via use of towel to catch spilled secretions. Per MBSS, exercises targeting  BOT retraction, epiglottic inversion, LVC, hyolaryngeal movement, and pharyngeal constriction. SLP provided education on effortful swallow. Pt completed effortful swallow x5 with good effort and accuracy. Pt declining additional exercises and PO intake d/t fatigue. \"That feels like a workout.\"     Clinical Impressions  Per MBSS completed on 9/24, pt presents with a moderate oropharyngeal dysphagia, likely acute related to CVA. Would recommend a cautious continuation of PO with a PU/TN diet and STRICT STRATEGY USE at this time. Pt will need 1:1 spv during meals as well as verbal cues to reinforce strategy use including head positioning. Pt will likely benefit from repeat " "diagnostic evaluation prior to upgrading diet given severity of dysphagia and ineffective cough. Pt appears to be a good candidate for ongoing behavorial and exercise-based swallow rehabilitation. Dysphagia outcomes can be maximized with use of mobility as pt is able and frequent, thorough oral care.     Recommendations  Dysphagia Treatment  Diet Consistency: Puree solids (PU4), Thin liquids (TN0)  Instrumentation: Instrumental swallow study pending clinical progress  Medication: Crush with applesauce, as appropriate, Crush with pudding/puree, as appropriate  Supervision: 1:1 feeding with constant supervision, Encourage self-feeding   -may need verbal cues for strategies-  Positioning: Fully upright and midline during oral intake, Meals sitting upright in a chair, as tolerated  Risk Management : Small bites/sips, Alternate bites and sips, Slow rate of intake, No straws, Physical mobility, as tolerated, Multiple swallows (2-3) per bite/sips, Use of suction during meals, neutral/downward facing head positioning  Oral Care: Q4h    SLP Treatment Plan  Treatment Plan: Dysphagia Treatment, Speech-Language Treatment, Patient/Family/Caregiver Training  SLP Frequency: 4x Per Week  Estimated Duration: Until Therapy Goals Met    Anticipated Discharge Needs  Discharge Recommendations: Recommend post-acute placement for additional speech therapy services prior to discharge home  Therapy Recommendations Upon DC: Dysphagia Training, Comprehension Training, Expression Training, Writing Training, Patient / Family / Caregiver Education, Community Re-Integration    Patient / Family Goals  Patient / Family Goal #1: \"I can't control liquids.\"  Goal #1 Outcome: Progressing as expected  Short Term Goals  Short Term Goal # 1: Pt will participate in MBSS w SLP to further evaluate swallow function and inform POC.  Goal Outcome # 1: Goal met, new goal added  Short Term Goal # 1 B : Pt will consume diet of PU/TN given 1:1 spv and strict " "strategy use with no worsening of respiratory status.  Goal Outcome  # 1 B: Progressing slower than expected  Short Term Goal # 2: Pt will complete exercises targeting BOT retraction, epiglottic inversion, LVC, hyolaryngeal movement, and pharyngeal constriction x30 in a session given min cues from SLP with \"good\" accuracy.  Goal Outcome # 2 : Progressing slower than expected  Short Term Goal # 3: Utilizing a total communication approach, pt will express wants/needs independently with >90% accuracy.  Goal Outcome  # 3: Other (see comments) (not targeted this session)  Short Term Goal # 4: With MOD cues, pt will utilize 'clear speech' techniques to increase intelligibility at the sentence level to >65%.  Goal Outcome  # 4: Other (see comments) (not targeted this session)  Short Term Goal # 5: With MOD cues, pt will follow complex 2-3 step commands in order to participate in functional ADL/iADL activities with >75% accuracy.  Goal Outcome  # 5: Other (see comments) (not targeted this session)    Carmita Pablo, SLP  "

## 2023-09-25 NOTE — ED NOTES
"Pt seen pulling off monitors, RN came in room and asked pt what was going on. Pt asking for discharge papers, RN explained admit process and apologized for wait. Pt continued to get up and say he wanted to leave. RN asked pt to let her help him take leads off so he didn't fall and asked for time to be able to talk to MD. PT replied \"Fuck you, go to hell\". RN asked pt what was upsetting him and he replied \"bathroom\". Dr. Barrett notified.   "

## 2023-09-25 NOTE — ASSESSMENT & PLAN NOTE
"Goal normotension   home amlodipine, added losartan  PRN labetalol, with hydralazine as backup if HR low\"    Vitals:    10/03/23 1735   BP:    Pulse:    Resp: 16   Temp:    SpO2:      BP stable  "

## 2023-09-25 NOTE — PROGRESS NOTES
"Hospital Medicine Daily Progress Note    Date of Service  9/25/2023    Chief Complaint  Zane Galeano is a 63 y.o. male admitted 9/23/2023 with aphasia    Hospital Course  Zane Galeano is a 62 y.o. male who presented 9/23/2023 with aphasia.  This is a pleasant man who reports a past history of 1 \"massive stroke\" 2 years ago after which she had aphasia which improved, and 3 TIAs with the most recent one 6 months ago.  Also reports a history of A-fib not on any medications and does not give a clear reason for this, as well as diabetes and chronic kidney disease.      He presents due to sudden onset aphasia after waking up from a nap at around 230 yesterday afternoon.  Last known normal was around noon when he went to bed from a nap and woke up and was unable to speak.  Apparently after that they went out for dinner and he choked on a hamburger which is what brought him into the ED.  Patient was able to dislodge the hamburger.      Given his new onset aphasia work-up for stroke was initiated, CT perfusion scan did show a penumbra of 16 mL, and CTA of the head showed M1 occlusions bilaterally with collaterals.  Neurology was consulted, Dr. Guillen, did not recommend any emergent interventions, recommended admission for MRI and speech therapy and general stroke management.  Patient was given aspirin.    Interval Problem Update  Patient's expressive aphasia persist    MRI of the brain ordered    pT OT ordered    Discussed with neurology team Dr. Ferrari-change aspirin to Eliquis      Rehab referral made      I have discussed this patient's plan of care and discharge plan at IDT rounds today with Case Management, Nursing, Nursing leadership, and other members of the IDT team.    Consultants/Specialty  Vascular neurology    Code Status  Full Code    Disposition  The patient is not medically cleared for discharge to home or a post-acute facility.  Anticipate discharge to: home with close outpatient follow-up    I have placed " the appropriate orders for post-discharge needs.    Review of Systems  Review of Systems   Constitutional:  Negative for chills and fever.   HENT:  Negative for congestion and sore throat.    Eyes:  Negative for blurred vision and double vision.   Respiratory:  Negative for cough and shortness of breath.    Cardiovascular:  Negative for chest pain, palpitations and leg swelling.   Gastrointestinal:  Negative for heartburn and nausea.   Genitourinary:  Negative for dysuria and urgency.   Musculoskeletal:  Negative for myalgias and neck pain.   Neurological:  Positive for speech change. Negative for dizziness, focal weakness, weakness and headaches.        Physical Exam  Pulse:  [52-90] 66  Resp:  [12-19] 17  BP: (110-187)/() 143/64  SpO2:  [90 %-99 %] 96 %    Physical Exam  Constitutional:       General: He is not in acute distress.     Appearance: He is not toxic-appearing.   HENT:      Head: Normocephalic and atraumatic.      Nose: Nose normal.      Mouth/Throat:      Mouth: Mucous membranes are moist.      Pharynx: Oropharynx is clear.      Comments: drooling  Eyes:      Extraocular Movements: Extraocular movements intact.      Conjunctiva/sclera: Conjunctivae normal.   Cardiovascular:      Rate and Rhythm: Normal rate and regular rhythm.      Pulses: Normal pulses.      Heart sounds: Normal heart sounds.   Pulmonary:      Effort: Pulmonary effort is normal.      Breath sounds: Normal breath sounds.   Abdominal:      General: Abdomen is flat.      Palpations: Abdomen is soft.   Musculoskeletal:         General: No swelling or deformity. Normal range of motion.      Cervical back: Normal range of motion and neck supple.   Skin:     General: Skin is warm and dry.   Neurological:      Mental Status: He is oriented to person, place, and time.      Sensory: No sensory deficit.      Motor: No weakness.      Comments: Cannot speak words. Can make some noises. Fully intact comprehension and produces fluent written  language. Left facial droop, tongue deviation to the left.        Fluids    Intake/Output Summary (Last 24 hours) at 9/25/2023 1302  Last data filed at 9/25/2023 0542  Gross per 24 hour   Intake 1866.91 ml   Output --   Net 1866.91 ml       Laboratory  Recent Labs     09/23/23  2300 09/25/23  0314   WBC 6.9 6.8   RBC 3.39* 3.44*   HEMOGLOBIN 9.3* 9.6*   HEMATOCRIT 29.4* 28.9*   MCV 86.7 84.0   MCH 27.4 27.9   MCHC 31.6* 33.2   RDW 44.5 43.4   PLATELETCT 254 245   MPV 9.6 9.1       Recent Labs     09/23/23 2300 09/25/23  0314   SODIUM 140 141   POTASSIUM 3.8 3.4*   CHLORIDE 107 108   CO2 22 21   GLUCOSE 261* 169*   BUN 23* 13   CREATININE 1.26 0.94   CALCIUM 8.6 8.3*       Recent Labs     09/24/23  0023   APTT 24.6*   INR 1.09           Recent Labs     09/23/23 2300   TRIGLYCERIDE 79   HDL 27*   LDL 48         Imaging  MR-BRAIN-W/O   Final Result      1.  Small areas of acute infarcts in the left parietal lobe.   2.  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 The blood vessels better evaluated on the recent the CT angiogram.   3.  Chronic infarct in the right temporal and parietal lobes.   4.  Mild chronic microvascular ischemic disease.   5.  Mild cerebral volume loss.      EC-ECHOCARDIOGRAM COMPLETE W/ CONT         DX-ESOPHAGUS - [id number]   Final Result      CT-CTA NECK WITH & W/O-POST PROCESSING   Final Result      CT angiogram of the neck within normal limits.      CT-CTA HEAD WITH & W/O-POST PROCESS   Final Result      1.  Occlusion of the M1 segments of the middle cerebral artery is noted bilaterally. These occlusions are likely chronic and collaterals are present as described above.      2.  No other occlusion or filling defect identified.      3.  These findings were discussed with DEVON MEJÍA on 09/24/2023.      CT-CEREBRAL PERFUSION ANALYSIS   Final Result      1.  Cerebral blood flow less than 30% likely representing completed  infarct = 28 mL.      2.  T Max more than 6 seconds likely representing combination of completed infarct and ischemia = 44 mL.      3.  Mismatched volume likely representing ischemic brain/penumbra = 16 mL      4.  Please note that the cerebral perfusion was performed on the limited brain tissue around the basal ganglia region. Infarct/ischemia outside the CT perfusion sections can be missed in this study.      CT-HEAD W/O   Final Result         NO ACUTE ABNORMALITIES ARE NOTED ON CT SCAN OF THE HEAD.      Findings are consistent with atrophy.  Decreased attenuation in the periventricular white matter likely indicates microvascular ischemic disease.         DX-CHEST-PORTABLE (1 VIEW)   Final Result         1.  Possible scarring or atelectasis in the left lung base. Aspiration is also possible.      2.  No other infiltrates or consolidations identified.             Assessment/Plan  * Acute CVA (cerebrovascular accident) (HCC)- (present on admission)  Assessment & Plan  With aphemia. Unable to form words, however full comprehension and preserved written language fluency.  History of 1 stroke and 3 TIAs per patient, stroke 2 years ago left him with aphasia that improved back to normal apparently.  Dr. Hestering with vascular neurology is following    Goal normotension.     -Admit to neuro, telemetry monitoring  -MRI brain -   -Echo with bubble study  -PT/OT  -NPO until cleared by SLP  -Aspirin, statin  -Lipid panel, A1c    Hypertension- (present on admission)  Assessment & Plan  Goal normotension   home amlodipine  Add PRN labetalol, with hydralazine as backup if HR low    AF (atrial fibrillation) (HCC)- (present on admission)  Assessment & Plan  Patient reports history of A-fib.  Not on any medications for this, anticoagulation nor rate control meds.        started Eliquis on 9/25/2023    Type 2 diabetes mellitus with circulatory disorder, without long-term current use of insulin (HCC)  Assessment &  Plan  Hemoglobin A1c 7.5  Insulin basal plus correctional      VTE prophylaxis:   SCDs/TEDs      I have performed a physical exam and reviewed and updated ROS and Plan today (9/25/2023). In review of yesterday's note (9/24/2023), there are no changes except as documented above.

## 2023-09-25 NOTE — ED NOTES
Pt ambulatory with 1 person assist to bathroom and back. PT back on monitor and allowing bp reading at this time. Report to LISSET Flores.

## 2023-09-25 NOTE — ED NOTES
Pt sitting up in bed witting, even chest rise and fall, reports no needs at this time, call light available and in reach.

## 2023-09-25 NOTE — ED NOTES
PT and RN spoke about pt's decision to AMA or stay. PT explaining he does not think he is going to fall and is frustrated that someone has to walk with him to the restroom. Extensive emotional support provided, pt now agreeing to stay and pt allowed RN to reconnect pt to monitor and maintenance fluids, however will not allow bp cuff or fsbg. Dr. Barrett informed.

## 2023-09-25 NOTE — ED NOTES
PT will allow 3am labs, however, pt refuses FSBG and 3am neuro check. Charge RN notified. PT reports he has someone picking him up, unknown what time pt family member is picking pt up. Pt continues to refuse monitor.

## 2023-09-25 NOTE — HOSPITAL COURSE
"Zane Galeano is a 62 y.o. male who presented 9/23/2023 with aphasia.  This is a pleasant man who reports a past history of 1 \"massive stroke\" 2 years ago after which she had aphasia which improved, and 3 TIAs with the most recent one 6 months ago.  Also reports a history of A-fib not on any medications and does not give a clear reason for this, as well as diabetes and chronic kidney disease.      He presents due to sudden onset aphasia after waking up from a nap at around 230 yesterday afternoon.  Last known normal was around noon when he went to bed from a nap and woke up and was unable to speak.  Apparently after that they went out for dinner and he choked on a hamburger which is what brought him into the ED.  Patient was able to dislodge the hamburger.      Given his new onset aphasia work-up for stroke was initiated, CT perfusion scan did show a penumbra of 16 mL, and CTA of the head showed M1 occlusions bilaterally with collaterals.  Neurology was consulted, Dr. Guillen, did not recommend any emergent interventions, recommended admission for MRI and speech therapy and general stroke management.  Patient was given aspirin.  "

## 2023-09-25 NOTE — ED NOTES
Dr. Barrett bedside. PT refuses to be on monitor, pt on phone. RN asked if pt wanted RN to call anyone for him, pt shook head. Dr. Barrett reports pt is determining whether he will stay or go, and reports he must sign AMA paperwork in order to leave.

## 2023-09-25 NOTE — ED NOTES
RN checked on pt for request for BP because of refusal since last bp at 0141, pt currently resting with eyes shut - Pt previous complaint included lack of sleep he was getting in ER, RN to check BP when pt wakes, pt resting even chest rise and fall, call light available and in reach.

## 2023-09-25 NOTE — PROGRESS NOTES
Neurology Progress Note  Neurohospitalist Service, [facility]    Referring Physician: Donato Brice M.D.      Interval History: No acute events overnight.  Admitted for dysphagia and expressive aphasia.  CT angiogram with findings consistent of bilateral moyamoya disease. MRI pending.      Review of systems: In addition to what is detailed in the HPI and/or updated in the interval history, all other systems reviewed and are negative.    Past Medical History, Past Surgical History and Social History reviewed and unchanged from prior    Medications:    Current Facility-Administered Medications:     scopolamine (Transderm-Scop) patch 1 Patch, 1 Patch, Transdermal, Q72HRS, José Barrett M.D., 1 Patch at 09/24/23 0301    insulin GLARGINE (Lantus,Semglee) injection, 10 Units, Subcutaneous, Q EVENING, 10 Units at 09/24/23 1803 **AND** insulin lispro (HumaLOG,AdmeLOG) injection, 1-6 Units, Subcutaneous, Q6HRS, 1 Units at 09/24/23 0609 **AND** POC blood glucose manual result, , , Q6H **AND** NOTIFY MD and PharmD, , , Once **AND** Administer 20 grams of glucose (approximately 8 ounces of fruit juice) every 15 minutes PRN FSBG less than 70 mg/dL, , , PRN **AND** dextrose 50% (D50W) injection 25 g, 25 g, Intravenous, Q15 MIN PRN, José Barrett M.D.    aspirin (Asa) chewable tab 81 mg, 81 mg, Oral, DAILY, 81 mg at 09/25/23 0510 **OR** aspirin (Asa) suppository 300 mg, 300 mg, Rectal, DAILY, José Barrett M.D., 300 mg at 09/24/23 0305    hydrALAZINE (Apresoline) injection 10 mg, 10 mg, Intravenous, Q4HRS PRN, Jake Dietz, A.P.R.N., 10 mg at 09/24/23 1505    atropine 1 % ophthalmic solution 2 Drop, 2 Drop, Sublingual, Q4HRS PRN, Jake Dietz, A.P.R.N., 2 Drop at 09/25/23 0328    atorvastatin (Lipitor) tablet 40 mg, 40 mg, Oral, Q EVENING, CORINNA JacksonP.R.N., 40 mg at 09/24/23 6994    Pharmacy Consult Request ...Pain Management Review 1 Each, 1 Each, Other, PHARMACY TO  "DOSE, Jake Dietz, A.P.R.N.    acetaminophen (Tylenol) tablet 650 mg, 650 mg, Oral, Q6HRS PRN, Jake Dietz, A.P.R.N., 650 mg at 09/24/23 1629    oxyCODONE immediate-release (Roxicodone) tablet 2.5 mg, 2.5 mg, Oral, Q3HRS PRN **OR** oxyCODONE immediate-release (Roxicodone) tablet 5 mg, 5 mg, Oral, Q3HRS PRN, 5 mg at 09/25/23 0458 **OR** HYDROmorphone (Dilaudid) injection 0.25 mg, 0.25 mg, Intravenous, Q3HRS PRN, Jake Dietz, A.P.R.N.    labetalol (Normodyne/Trandate) injection 10 mg, 10 mg, Intravenous, Q4HRS PRN, Jake Dietz, A.P.R.N., 10 mg at 09/24/23 1753    amLODIPine (Norvasc) tablet 10 mg, 10 mg, Oral, Q DAY, Jake Dietz, A.P.R.N., 10 mg at 09/25/23 0509    Current Outpatient Medications:     insulin glargine (LANTUS) 100 UNIT/ML SC SOLN, Inject 25 Units under the skin every morning., Disp: , Rfl:     atorvastatin (LIPITOR) 40 MG Tab, Take 40 mg by mouth every day., Disp: , Rfl:     acetaminophen (TYLENOL) 500 MG Tab, Take 500-2,000 mg by mouth 2 times a day as needed for Moderate Pain or Mild Pain., Disp: , Rfl:     amLODIPine (NORVASC) 5 MG Tab, Take 5 mg by mouth every day., Disp: , Rfl:     aspirin EC (ECOTRIN) 325 MG Tablet Delayed Response, Take 325 mg by mouth every day., Disp: , Rfl:     baclofen (LIORESAL) 10 MG Tab, Take 10 mg by mouth 2 times a day as needed (muscle spasm)., Disp: , Rfl:     bethanechol (URECHOLINE) 10 MG Tab, Take 10 mg by mouth 3 times a day., Disp: , Rfl:     metformin (GLUCOPHAGE) 1000 MG tablet, Take 1,000 mg by mouth 2 times a day with meals., Disp: , Rfl:     Physical Examination:   BP (!) 145/70   Pulse 70   Temp 36.9 °C (98.4 °F)   Resp 17   Ht 1.803 m (5' 11\")   Wt 63.5 kg (140 lb)   SpO2 97%   BMI 19.53 kg/m²       General: Patient is awake and in no acute distress  Neck: There is normal range of motion  CV: Regular rate   Extremities:  Warm, dry, and intact, without peripheral lower extremity edema    NEUROLOGICAL EXAM:     Mental " status: Awake, alert and fully oriented, follows commands  Speech and language: Speech is dysarthric.  Speech paucity.  Writes well.   Cranial nerve exam:  Visual fields are full. There is no nystagmus. Extraocular muscles are intact. Face is symmetric.   Motor exam: There is sustained antigravity with no downward drift in bilateral arms and legs.   Sensory exam:  Reacts to tactile in all 4 extremities, there is no neglect to double stim  Coordination: No ataxia on bilateral FTN testing      NIHSS: National Institutes of Health Stroke Scale    [0] 1a:Level of Consciousness    0-alert 1-drowsy   2-stupor   3-coma  [0] 1b:LOC Questions                  0-both  1-one      2-neither  [0] 1c:LOC Commands                   0-both  1-one      2-neither  [0] 2: Best Gaze                     0-nl    1-partial  2-forced  [0] 3: Visual Fields                   0-nl    1-partial  2-complete 3-bilat  [0] 4: Facial Paresis                0-nl    1-minor    2-partial  3-full  MOTOR                       0-nl  [0] 5: Right Arm           1-drift  [0] 6: Left Arm             2-some effort vs gravity  [0] 7: Right Leg           3-no effort vs gravity  [0] 8: Left Leg             4-no movement                             x-untestable  [0] 9: Limb Ataxia                    0-abs   1-1_limb   2-2+_limbs       x-untestable  [0] 10:Sensory                        0-nl    1-partial  2-dense  [1] 11:Best Language/Aphasia         0-nl    1-mild/mod 2-severe   3-mute  [1] 12:Dysarthria                     0-nl    1-mild/mod 2-severe       x-untestable  [0] 13:Neglect/Inattention            0-none  1-partial  2-complete  [2] TOTAL      Objective Data:    Labs:  Lab Results   Component Value Date/Time    PROTHROMBTM 14.2 09/24/2023 12:23 AM    INR 1.09 09/24/2023 12:23 AM      Lab Results   Component Value Date/Time    WBC 6.8 09/25/2023 03:14 AM    RBC 3.44 (L) 09/25/2023 03:14 AM    HEMOGLOBIN 9.6 (L) 09/25/2023 03:14 AM    HEMATOCRIT 28.9 (L)  09/25/2023 03:14 AM    MCV 84.0 09/25/2023 03:14 AM    MCH 27.9 09/25/2023 03:14 AM    MCHC 33.2 09/25/2023 03:14 AM    MPV 9.1 09/25/2023 03:14 AM    NEUTSPOLYS 62.80 09/25/2023 03:14 AM    LYMPHOCYTES 21.70 (L) 09/25/2023 03:14 AM    MONOCYTES 7.70 09/25/2023 03:14 AM    EOSINOPHILS 6.20 09/25/2023 03:14 AM    BASOPHILS 0.70 09/25/2023 03:14 AM      Lab Results   Component Value Date/Time    SODIUM 141 09/25/2023 03:14 AM    POTASSIUM 3.4 (L) 09/25/2023 03:14 AM    CHLORIDE 108 09/25/2023 03:14 AM    CO2 21 09/25/2023 03:14 AM    GLUCOSE 169 (H) 09/25/2023 03:14 AM    BUN 13 09/25/2023 03:14 AM    CREATININE 0.94 09/25/2023 03:14 AM      Lab Results   Component Value Date/Time    CHOLSTRLTOT 91 (L) 09/23/2023 11:00 PM    LDL 48 09/23/2023 11:00 PM    HDL 27 (A) 09/23/2023 11:00 PM    TRIGLYCERIDE 79 09/23/2023 11:00 PM       Lab Results   Component Value Date/Time    ALKPHOSPHAT 182 (H) 09/25/2023 03:14 AM    ASTSGOT 12 09/25/2023 03:14 AM    ALTSGPT 46 09/25/2023 03:14 AM    TBILIRUBIN 0.3 09/25/2023 03:14 AM        Imaging/Testing:    I interpreted and/or reviewed the patient's neuroimaging    DX-ESOPHAGUS - [id number]   Final Result      CT-CTA NECK WITH & W/O-POST PROCESSING   Final Result      CT angiogram of the neck within normal limits.      CT-CTA HEAD WITH & W/O-POST PROCESS   Final Result      1.  Occlusion of the M1 segments of the middle cerebral artery is noted bilaterally. These occlusions are likely chronic and collaterals are present as described above.      2.  No other occlusion or filling defect identified.      3.  These findings were discussed with DEVON MEJÍA on 09/24/2023.      CT-CEREBRAL PERFUSION ANALYSIS   Final Result      1.  Cerebral blood flow less than 30% likely representing completed infarct = 28 mL.      2.  T Max more than 6 seconds likely representing combination of completed infarct and ischemia = 44 mL.      3.  Mismatched volume likely representing ischemic  brain/penumbra = 16 mL      4.  Please note that the cerebral perfusion was performed on the limited brain tissue around the basal ganglia region. Infarct/ischemia outside the CT perfusion sections can be missed in this study.      CT-HEAD W/O   Final Result         NO ACUTE ABNORMALITIES ARE NOTED ON CT SCAN OF THE HEAD.      Findings are consistent with atrophy.  Decreased attenuation in the periventricular white matter likely indicates microvascular ischemic disease.         DX-CHEST-PORTABLE (1 VIEW)   Final Result         1.  Possible scarring or atelectasis in the left lung base. Aspiration is also possible.      2.  No other infiltrates or consolidations identified.      MR-BRAIN-W/O    (Results Pending)   EC-ECHOCARDIOGRAM COMPLETE W/ CONT    (Results Pending)       Assessment and Plan:  Zane Galeano is a 63 year old man with multiple vascular risk factors, history of recurrent strokes/TIAs, presenting with dysphagia and expressive aphasia.  CTA of head with findings consistent with bilateral, obliterative arteriopathy of the MCAs consistent with moyamoya disease/syndrome.  From a secondary stroke prevention standpoint, recommend long-term anticoagulation given history of paroxysmal atrial fibrillation, statin therapy, and BP control.  If MRI findings consistent with L MCA ischemia- may consider indirect bypass to promote collaterogenesis and improve cerebral reserves to mitigate future ischemic risk related to moyamoya-related hypoperfusion.    Problem list:   Dysphagia, aphasia   Bilateral obliterative arteriopathy of the MCAs  Hypertension  Diabetes  Hyperlipidemia    Plan:   - q4h neurochecks/NIHSS   - long-term BP goal is 110-130/60-80- ok to restart home anti-HTN.  No discrete ischemic penumbra seen on CTP (T max delay correlated to area of chronic infarct).  Avoid hypotension   - MRI brain without contrast   - based on MRI results- consider NSG/Neuro-IR consult for possible EDAS to improve L  hemisphere ischemic reserves   - stop ASA, start apixaban 5mg BID given history of atrial fibrillation   - stroke labs:  HgbA1c 7.5, LDL 48   - continue home atorvastatin 40mg daily for goal LDL < 70   - DM management for goal HgbA1c < 7   - may defer TTE and ziopatch monitoring given known Afib history and findings will not change the above medical management   - PT/OT/SLP    The evaluation of the patient, and recommended management, was discussed with Dr. Brice,  attending hospitalist. I have performed a physical exam and reviewed and updated ROS and Plan today (9/25/2023).     Yung Ferrari MD  Neurohospitalist, Acute Care Services

## 2023-09-25 NOTE — ED NOTES
RN noticed pt was off of monitor, pt reports he is annoyed with being connected to wires and still being in the ER. RN apologized and emotion support was provided. PT repositioned in bed and monitors reapplied to pt. Pt resting with even chest rise and fall, reports no needs at this time, call light available and in reach.

## 2023-09-25 NOTE — ED NOTES
Hospitalist, Dr. Barrett notified of previous small run of v-tach. Pt resting with eyes closed with even chest rise and fall, call light available and in reach.

## 2023-09-25 NOTE — ED NOTES
Run of v-tach small run of v-tach noted on ecg monitoring, pt asymptomatic. Hospitalist, J Luis, messaged via [employer].

## 2023-09-25 NOTE — ED NOTES
PT requesting to eat now. FSBG 176, RN to medicated pt per MAR, however telling RN that he does not want the humalog insulin. RN educated pt regarding blood sugar management. Pt still refusing humalog insulin. Pt resting with even chest rise and fall, reports no needs at this time, call light available and in reach.

## 2023-09-25 NOTE — ED NOTES
Pt resting with eyes shut with even chest rise and fall, reports no needs at this time, call light available and in reach.

## 2023-09-25 NOTE — ED NOTES
PT insisted on walking to bathroom, tech and RN walked pt to bathroom with slightly unsteady gait. Pt throwing up arms, angry, motioning that he wants to walk by self and be in bathroom by self. Pt stumbled backwards in bathroom, but was stabilized by RN and tech. Pt insisting that RN and tech leave bathroom. RN able to convince pt to have male tech in bathroom with him. Pt ambulated back to room with steady gait. Pt resting with even chest rise and fall, reports no needs at this time, tech at bedside for ekg, call light available and in reach.

## 2023-09-26 DIAGNOSIS — I66.03 OCCLUSION OF MIDDLE CEREBRAL ARTERY, BILATERAL: ICD-10-CM

## 2023-09-26 LAB
GLUCOSE BLD STRIP.AUTO-MCNC: 170 MG/DL (ref 65–99)
GLUCOSE BLD STRIP.AUTO-MCNC: 173 MG/DL (ref 65–99)
GLUCOSE BLD STRIP.AUTO-MCNC: 193 MG/DL (ref 65–99)
GLUCOSE BLD STRIP.AUTO-MCNC: 218 MG/DL (ref 65–99)
GLUCOSE BLD STRIP.AUTO-MCNC: 225 MG/DL (ref 65–99)

## 2023-09-26 PROCEDURE — 700102 HCHG RX REV CODE 250 W/ 637 OVERRIDE(OP)

## 2023-09-26 PROCEDURE — 99232 SBSQ HOSP IP/OBS MODERATE 35: CPT | Performed by: PSYCHIATRY & NEUROLOGY

## 2023-09-26 PROCEDURE — 700102 HCHG RX REV CODE 250 W/ 637 OVERRIDE(OP): Performed by: HOSPITALIST

## 2023-09-26 PROCEDURE — 97166 OT EVAL MOD COMPLEX 45 MIN: CPT

## 2023-09-26 PROCEDURE — A9270 NON-COVERED ITEM OR SERVICE: HCPCS | Performed by: HOSPITALIST

## 2023-09-26 PROCEDURE — A9270 NON-COVERED ITEM OR SERVICE: HCPCS

## 2023-09-26 PROCEDURE — 82962 GLUCOSE BLOOD TEST: CPT

## 2023-09-26 PROCEDURE — 99232 SBSQ HOSP IP/OBS MODERATE 35: CPT | Performed by: STUDENT IN AN ORGANIZED HEALTH CARE EDUCATION/TRAINING PROGRAM

## 2023-09-26 PROCEDURE — 770020 HCHG ROOM/CARE - TELE (206)

## 2023-09-26 PROCEDURE — 97162 PT EVAL MOD COMPLEX 30 MIN: CPT

## 2023-09-26 PROCEDURE — A9270 NON-COVERED ITEM OR SERVICE: HCPCS | Performed by: STUDENT IN AN ORGANIZED HEALTH CARE EDUCATION/TRAINING PROGRAM

## 2023-09-26 PROCEDURE — 700102 HCHG RX REV CODE 250 W/ 637 OVERRIDE(OP): Performed by: STUDENT IN AN ORGANIZED HEALTH CARE EDUCATION/TRAINING PROGRAM

## 2023-09-26 RX ORDER — INSULIN LISPRO 100 [IU]/ML
1-6 INJECTION, SOLUTION INTRAVENOUS; SUBCUTANEOUS
Status: DISCONTINUED | OUTPATIENT
Start: 2023-09-26 | End: 2023-09-27

## 2023-09-26 RX ORDER — LOSARTAN POTASSIUM 50 MG/1
25 TABLET ORAL
Status: DISCONTINUED | OUTPATIENT
Start: 2023-09-26 | End: 2023-09-29

## 2023-09-26 RX ORDER — DEXTROSE MONOHYDRATE 25 G/50ML
25 INJECTION, SOLUTION INTRAVENOUS
Status: DISCONTINUED | OUTPATIENT
Start: 2023-09-26 | End: 2023-09-27

## 2023-09-26 RX ADMIN — INSULIN LISPRO 1 UNITS: 100 INJECTION, SOLUTION INTRAVENOUS; SUBCUTANEOUS at 12:35

## 2023-09-26 RX ADMIN — INSULIN LISPRO 1 UNITS: 100 INJECTION, SOLUTION INTRAVENOUS; SUBCUTANEOUS at 21:55

## 2023-09-26 RX ADMIN — ATORVASTATIN CALCIUM 40 MG: 40 TABLET, FILM COATED ORAL at 17:57

## 2023-09-26 RX ADMIN — AMLODIPINE BESYLATE 10 MG: 5 TABLET ORAL at 06:02

## 2023-09-26 RX ADMIN — INSULIN LISPRO 2 UNITS: 100 INJECTION, SOLUTION INTRAVENOUS; SUBCUTANEOUS at 00:28

## 2023-09-26 RX ADMIN — ACETAMINOPHEN 650 MG: 325 TABLET, FILM COATED ORAL at 17:57

## 2023-09-26 RX ADMIN — APIXABAN 5 MG: 5 TABLET, FILM COATED ORAL at 06:02

## 2023-09-26 RX ADMIN — INSULIN LISPRO 2 UNITS: 100 INJECTION, SOLUTION INTRAVENOUS; SUBCUTANEOUS at 17:52

## 2023-09-26 RX ADMIN — INSULIN LISPRO 1 UNITS: 100 INJECTION, SOLUTION INTRAVENOUS; SUBCUTANEOUS at 08:39

## 2023-09-26 RX ADMIN — ATROPINE SULFATE 2 DROP: 10 SOLUTION/ DROPS OPHTHALMIC at 15:31

## 2023-09-26 RX ADMIN — LOSARTAN POTASSIUM 25 MG: 50 TABLET, FILM COATED ORAL at 17:59

## 2023-09-26 RX ADMIN — APIXABAN 5 MG: 5 TABLET, FILM COATED ORAL at 17:57

## 2023-09-26 ASSESSMENT — COGNITIVE AND FUNCTIONAL STATUS - GENERAL
DAILY ACTIVITIY SCORE: 16
CLIMB 3 TO 5 STEPS WITH RAILING: A LOT
MOBILITY SCORE: 22
EATING MEALS: A LITTLE
HELP NEEDED FOR BATHING: A LITTLE
DRESSING REGULAR UPPER BODY CLOTHING: A LITTLE
SUGGESTED CMS G CODE MODIFIER MOBILITY: CJ
DRESSING REGULAR LOWER BODY CLOTHING: A LOT
PERSONAL GROOMING: A LITTLE
SUGGESTED CMS G CODE MODIFIER DAILY ACTIVITY: CK
TOILETING: A LOT

## 2023-09-26 ASSESSMENT — ENCOUNTER SYMPTOMS
VOMITING: 0
TINGLING: 0
BLURRED VISION: 0
SENSORY CHANGE: 0
DIARRHEA: 0
SHORTNESS OF BREATH: 0
SPEECH CHANGE: 1
HEADACHES: 0
FEVER: 0
ABDOMINAL PAIN: 0
PALPITATIONS: 0
WEAKNESS: 0
NAUSEA: 0
CONSTIPATION: 0
MYALGIAS: 0
FOCAL WEAKNESS: 0
CHILLS: 0

## 2023-09-26 ASSESSMENT — GAIT ASSESSMENTS
DEVIATION: DECREASED BASE OF SUPPORT;DECREASED HEEL STRIKE
GAIT LEVEL OF ASSIST: STANDBY ASSIST
ASSISTIVE DEVICE: FRONT WHEEL WALKER
DISTANCE (FEET): 600

## 2023-09-26 ASSESSMENT — FIBROSIS 4 INDEX: FIB4 SCORE: 0.45

## 2023-09-26 ASSESSMENT — ACTIVITIES OF DAILY LIVING (ADL): TOILETING: INDEPENDENT

## 2023-09-26 ASSESSMENT — PAIN DESCRIPTION - PAIN TYPE: TYPE: ACUTE PAIN

## 2023-09-26 NOTE — PROGRESS NOTES
Radiology Consult  Author: LOREN Johnson Date & Time created: 9/26/2023  4:51 PM   Date of admission  9/23/2023  Note to reader: this note follows the APSO format rather than the historical SOAP format. Assessment and plan located at the top of the note for ease of use.    Chief Complaint  63 y.o. male admitted 9/23/2023 with   Chief Complaint   Patient presents with    Difficulty Breathing     PT BIBA for a partial airway obstruction. Patient was eating at home and exerienced choking on a hamburger. Patient was able to dislodge some of the obstruction on EMS arrival. Patient dislodged what he felt was the rest of it on arrival to ED.        HPI  63 year old man with history of recurrent strokes/TIAs, presenting with dysphagia and expressive aphasia.  CTA of the head showed bilateral M1 segment occlusion of the MCA with collaterals consistent with moyamoya disease/syndrome.  Patient was admitted for general stroke management and speech therapy.  Neurosurgery consulted and did not recommend any emergent interventions.  Tentative plan is to undergo Encephaloduroarteriosynangiosis (EDAS) with neurosurgery in 4 to 6 weeks.  Prior to that, patient will need a diagnostic angiogram which will have to be done through IR.     Principal Problem:    Acute CVA (cerebrovascular accident) (HCC)  Active Problems:    Type 2 diabetes mellitus with circulatory disorder, without long-term current use of insulin (HCC)    AF (atrial fibrillation) (HCC)    Hypertension      Plan IR  - Order placed for cerebral angiogram with neuro IR to be completed in approximately 2 weeks.    Thank you for allowing Interventional Radiology team to participate in the patients care, if any additonal care or requests are needed in the future please do not hesitate call or place IR order      [id number]             Review of Systems  Physical Exam   Review of Systems   Constitutional:  Negative for chills and fever.   Respiratory:  Negative for  shortness of breath.    Cardiovascular:  Negative for chest pain and palpitations.   Gastrointestinal:  Negative for nausea and vomiting.   Neurological:  Positive for speech change. Negative for tingling, sensory change, focal weakness, weakness and headaches.      Vitals:    09/26/23 1545   BP: (!) 159/74   Pulse: 74   Resp: 18   Temp: 37 °C (98.6 °F)   SpO2: 93%      Physical Exam  Cardiovascular:      Rate and Rhythm: Normal rate.   Pulmonary:      Effort: Pulmonary effort is normal.   Abdominal:      Palpations: Abdomen is soft.   Skin:     General: Skin is warm and dry.   Neurological:      General: No focal deficit present.      Mental Status: He is alert and oriented to person, place, and time.      Cranial Nerves: No cranial nerve deficit.      Sensory: No sensory deficit.      Motor: No weakness.      Coordination: Coordination normal.      Comments: Dysarthria, speech paucity   Psychiatric:         Mood and Affect: Mood normal.         Behavior: Behavior normal.             Labs    Recent Labs     09/23/23 2300 09/25/23  0314   WBC 6.9 6.8   RBC 3.39* 3.44*   HEMOGLOBIN 9.3* 9.6*   HEMATOCRIT 29.4* 28.9*   MCV 86.7 84.0   MCH 27.4 27.9   MCHC 31.6* 33.2   RDW 44.5 43.4   PLATELETCT 254 245   MPV 9.6 9.1     Recent Labs     09/23/23  2300 09/25/23  0314   SODIUM 140 141   POTASSIUM 3.8 3.4*   CHLORIDE 107 108   CO2 22 21   GLUCOSE 261* 169*   BUN 23* 13   CREATININE 1.26 0.94   CALCIUM 8.6 8.3*     EC-ECHOCARDIOGRAM COMPLETE W/O CONT   Final Result      MR-BRAIN-W/O   Final Result      1.  Small areas of acute infarcts in the left parietal lobe.   2.  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 The blood vessels better evaluated on the recent the CT angiogram.   3.  Chronic infarct in the right temporal and parietal lobes.   4.  Mild chronic microvascular ischemic disease.   5.  Mild cerebral volume loss.      DX-ESOPHAGUS - [id number]  "  Final Result      CT-CTA NECK WITH & W/O-POST PROCESSING   Final Result      CT angiogram of the neck within normal limits.      CT-CTA HEAD WITH & W/O-POST PROCESS   Final Result      1.  Occlusion of the M1 segments of the middle cerebral artery is noted bilaterally. These occlusions are likely chronic and collaterals are present as described above.      2.  No other occlusion or filling defect identified.      3.  These findings were discussed with DEVON MEJÍA on 09/24/2023.      CT-CEREBRAL PERFUSION ANALYSIS   Final Result      1.  Cerebral blood flow less than 30% likely representing completed infarct = 28 mL.      2.  T Max more than 6 seconds likely representing combination of completed infarct and ischemia = 44 mL.      3.  Mismatched volume likely representing ischemic brain/penumbra = 16 mL      4.  Please note that the cerebral perfusion was performed on the limited brain tissue around the basal ganglia region. Infarct/ischemia outside the CT perfusion sections can be missed in this study.      CT-HEAD W/O   Final Result         NO ACUTE ABNORMALITIES ARE NOTED ON CT SCAN OF THE HEAD.      Findings are consistent with atrophy.  Decreased attenuation in the periventricular white matter likely indicates microvascular ischemic disease.         DX-CHEST-PORTABLE (1 VIEW)   Final Result         1.  Possible scarring or atelectasis in the left lung base. Aspiration is also possible.      2.  No other infiltrates or consolidations identified.      IR-NEURO INTERVENTIONAL CONSULT-IP    (Results Pending)     Recent Labs     09/23/23  2300 09/25/23  0314   SODIUM 140 141   POTASSIUM 3.8 3.4*   CHLORIDE 107 108   CO2 22 21   GLUCOSE 261* 169*   BUN 23* 13     INR   Date Value Ref Range Status   09/24/2023 1.09 0.87 - 1.13 Final     Comment:     INR - Non-therapeutic Reference Range: 0.87-1.13  INR - Therapeutic Reference Range: 2.0-4.0       No results found for: \"POCINR\"     Intake/Output Summary (Last " 24 hours) at 9/26/2023 1651  Last data filed at 9/26/2023 0000  Gross per 24 hour   Intake 50 ml   Output 50 ml   Net 0 ml      Labs not explicitly included in this progress note were reviewed by the author. Radiology/imaging not explicitly included in this progress note was reviewed by the author.     No past medical history on file.     Home Medications    Medication Sig Taking? Last Dose Authorizing Provider   insulin glargine (LANTUS) 100 UNIT/ML SC SOLN Inject 25 Units under the skin every morning. Yes 9/21/2023 at AM Physician Outpatient   atorvastatin (LIPITOR) 40 MG Tab Take 40 mg by mouth every day. Yes 9/23/2023 Nn Emergency Md Per Protocol, M.D.   acetaminophen (TYLENOL) 500 MG Tab Take 500-2,000 mg by mouth 2 times a day as needed for Moderate Pain or Mild Pain. Yes 9/23/2023 Physician Outpatient   amLODIPine (NORVASC) 5 MG Tab Take 5 mg by mouth every day. Yes 9/23/2023 Physician Outpatient   aspirin EC (ECOTRIN) 325 MG Tablet Delayed Response Take 325 mg by mouth every day. Yes 9/23/2023 Physician Outpatient   baclofen (LIORESAL) 10 MG Tab Take 10 mg by mouth 2 times a day as needed (muscle spasm). Yes unknown at out Physician Outpatient   bethanechol (URECHOLINE) 10 MG Tab Take 10 mg by mouth 3 times a day. Yes unknown at out Physician Outpatient   metformin (GLUCOPHAGE) 1000 MG tablet Take 1,000 mg by mouth 2 times a day with meals. Yes  Physician Outpatient       I have performed a physical exam and reviewed and updated ROS and Plan today (9/26/2023).     45 minutes in directly providing and coordinating care and extensive data review.  No time overlap and excludes procedures.

## 2023-09-26 NOTE — PROGRESS NOTES
4 Eyes Skin Assessment Completed by Cedric RN and LISSET Houston.    Head WDL  Ears WDL  Nose WDL  Mouth WDL  Neck WDL  Breast/Chest WDL  Shoulder Blades WDL  Spine WDL  (R) Arm/Elbow/Hand WDL  (L) Arm/Elbow/Hand WDL  Abdomen WDL  Groin WDL  Scrotum/Coccyx/Buttocks WDL  (R) Leg WDL  (L) Leg WDL  (R) Heel/Foot/Toe Scar and Scab  (L) Heel/Foot/Toe WDL          Devices In Places Tele Box and Blood Pressure Cuff      Interventions In Place Pillows    Possible Skin Injury No    Pictures Uploaded Into Epic N/A  Wound Consult Placed N/A  RN Wound Prevention Protocol Ordered No

## 2023-09-26 NOTE — PROGRESS NOTES
Neurology Progress Note  Neurohospitalist Service, [facility]    Referring Physician: Donato Brice M.D.      Interval History: No acute events overnight.  Aphasia marginally improved today.    Review of systems: In addition to what is detailed in the HPI and/or updated in the interval history, all other systems reviewed and are negative.    Past Medical History, Past Surgical History and Social History reviewed and unchanged from prior    Medications:    Current Facility-Administered Medications:     insulin GLARGINE (Lantus,Semglee) injection, 10 Units, Subcutaneous, Q EVENING **AND** insulin lispro (HumaLOG,AdmeLOG) injection, 1-6 Units, Subcutaneous, 4X/DAY ACHS, 1 Units at 09/26/23 0839 **AND** POC blood glucose manual result, , , Q6H **AND** NOTIFY MD and PharmD, , , Once **AND** Administer 20 grams of glucose (approximately 8 ounces of fruit juice) every 15 minutes PRN FSBG less than 70 mg/dL, , , PRN **AND** dextrose 50% (D50W) injection 25 g, 25 g, Intravenous, Q15 MIN PRN, Hyacinth England, A.P.R.N.    apixaban (Eliquis) tablet 5 mg, 5 mg, Oral, BID, Donato Brice M.D., 5 mg at 09/26/23 0602    scopolamine (Transderm-Scop) patch 1 Patch, 1 Patch, Transdermal, Q72HRS, José Barrett M.D., 1 Patch at 09/24/23 0301    hydrALAZINE (Apresoline) injection 10 mg, 10 mg, Intravenous, Q4HRS PRN, Jake Dietz, A.P.R.N., 10 mg at 09/24/23 1505    atropine 1 % ophthalmic solution 2 Drop, 2 Drop, Sublingual, Q4HRS PRN, Jake Dietz, A.P.R.N., 2 Drop at 09/25/23 0328    atorvastatin (Lipitor) tablet 40 mg, 40 mg, Oral, Q EVENING, Jake Dietz, A.P.R.N., 40 mg at 09/25/23 1805    Pharmacy Consult Request ...Pain Management Review 1 Each, 1 Each, Other, PHARMACY TO DOSE, BRIAN Jackson.P.R.N.    acetaminophen (Tylenol) tablet 650 mg, 650 mg, Oral, Q6HRS PRN, CORINNA JacksonP.R.N., 650 mg at 09/24/23 2423    oxyCODONE immediate-release (Roxicodone) tablet 2.5  "mg, 2.5 mg, Oral, Q3HRS PRN **OR** oxyCODONE immediate-release (Roxicodone) tablet 5 mg, 5 mg, Oral, Q3HRS PRN, 5 mg at 09/25/23 2152 **OR** HYDROmorphone (Dilaudid) injection 0.25 mg, 0.25 mg, Intravenous, Q3HRS PRN, Jake TORRES Matzkanin, A.P.R.N.    labetalol (Normodyne/Trandate) injection 10 mg, 10 mg, Intravenous, Q4HRS PRN, Jake A Matzkanin, A.P.R.N., 10 mg at 09/24/23 1753    amLODIPine (Norvasc) tablet 10 mg, 10 mg, Oral, Q DAY, Jake A Matzkanin, A.P.R.N., 10 mg at 09/26/23 0602    Physical Examination:   BP (!) 153/73   Pulse (!) 58   Temp 37 °C (98.6 °F) (Temporal)   Resp 16   Ht 1.803 m (5' 11\")   Wt 64 kg (141 lb 1.5 oz)   SpO2 95%   BMI 19.68 kg/m²       General: Patient is awake and in no acute distress  Neck: There is normal range of motion  CV: Regular rate   Extremities:  Warm, dry, and intact, without peripheral lower extremity edema    NEUROLOGICAL EXAM:     Mental status: Awake, alert and fully oriented, follows commands  Speech and language: Speech is dysarthric.  Speech paucity.  Writes well.   Cranial nerve exam:  Visual fields are full. There is no nystagmus. Extraocular muscles are intact. Face is symmetric.   Motor exam: There is sustained antigravity with no downward drift in bilateral arms and legs.   Sensory exam:  Reacts to tactile in all 4 extremities, there is no neglect to double stim  Coordination: No ataxia on bilateral FTN testing      NIHSS: National Institutes of Health Stroke Scale    [0] 1a:Level of Consciousness    0-alert 1-drowsy   2-stupor   3-coma  [0] 1b:LOC Questions                  0-both  1-one      2-neither  [0] 1c:LOC Commands                   0-both  1-one      2-neither  [0] 2: Best Gaze                     0-nl    1-partial  2-forced  [0] 3: Visual Fields                   0-nl    1-partial  2-complete 3-bilat  [0] 4: Facial Paresis                0-nl    1-minor    2-partial  3-full  MOTOR                       0-nl  [0] 5: Right Arm         "   1-drift  [0] 6: Left Arm             2-some effort vs gravity  [0] 7: Right Leg           3-no effort vs gravity  [0] 8: Left Leg             4-no movement                             x-untestable  [0] 9: Limb Ataxia                    0-abs   1-1_limb   2-2+_limbs       x-untestable  [0] 10:Sensory                        0-nl    1-partial  2-dense  [1] 11:Best Language/Aphasia         0-nl    1-mild/mod 2-severe   3-mute  [1] 12:Dysarthria                     0-nl    1-mild/mod 2-severe       x-untestable  [0] 13:Neglect/Inattention            0-none  1-partial  2-complete  [2] TOTAL      Objective Data:    Labs:  Lab Results   Component Value Date/Time    PROTHROMBTM 14.2 09/24/2023 12:23 AM    INR 1.09 09/24/2023 12:23 AM      Lab Results   Component Value Date/Time    WBC 6.8 09/25/2023 03:14 AM    RBC 3.44 (L) 09/25/2023 03:14 AM    HEMOGLOBIN 9.6 (L) 09/25/2023 03:14 AM    HEMATOCRIT 28.9 (L) 09/25/2023 03:14 AM    MCV 84.0 09/25/2023 03:14 AM    MCH 27.9 09/25/2023 03:14 AM    MCHC 33.2 09/25/2023 03:14 AM    MPV 9.1 09/25/2023 03:14 AM    NEUTSPOLYS 62.80 09/25/2023 03:14 AM    LYMPHOCYTES 21.70 (L) 09/25/2023 03:14 AM    MONOCYTES 7.70 09/25/2023 03:14 AM    EOSINOPHILS 6.20 09/25/2023 03:14 AM    BASOPHILS 0.70 09/25/2023 03:14 AM      Lab Results   Component Value Date/Time    SODIUM 141 09/25/2023 03:14 AM    POTASSIUM 3.4 (L) 09/25/2023 03:14 AM    CHLORIDE 108 09/25/2023 03:14 AM    CO2 21 09/25/2023 03:14 AM    GLUCOSE 169 (H) 09/25/2023 03:14 AM    BUN 13 09/25/2023 03:14 AM    CREATININE 0.94 09/25/2023 03:14 AM      Lab Results   Component Value Date/Time    CHOLSTRLTOT 91 (L) 09/23/2023 11:00 PM    LDL 48 09/23/2023 11:00 PM    HDL 27 (A) 09/23/2023 11:00 PM    TRIGLYCERIDE 79 09/23/2023 11:00 PM       Lab Results   Component Value Date/Time    ALKPHOSPHAT 182 (H) 09/25/2023 03:14 AM    ASTSGOT 12 09/25/2023 03:14 AM    ALTSGPT 46 09/25/2023 03:14 AM    TBILIRUBIN 0.3 09/25/2023 03:14 AM         Imaging/Testing:    I interpreted and/or reviewed the patient's neuroimaging    EC-ECHOCARDIOGRAM COMPLETE W/O CONT   Final Result      MR-BRAIN-W/O   Final Result      1.  Small areas of acute infarcts in the left parietal lobe.   2.  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 The blood vessels better evaluated on the recent the CT angiogram.   3.  Chronic infarct in the right temporal and parietal lobes.   4.  Mild chronic microvascular ischemic disease.   5.  Mild cerebral volume loss.      DX-ESOPHAGUS - [id number]   Final Result      CT-CTA NECK WITH & W/O-POST PROCESSING   Final Result      CT angiogram of the neck within normal limits.      CT-CTA HEAD WITH & W/O-POST PROCESS   Final Result      1.  Occlusion of the M1 segments of the middle cerebral artery is noted bilaterally. These occlusions are likely chronic and collaterals are present as described above.      2.  No other occlusion or filling defect identified.      3.  These findings were discussed with DEVON MEJÍA on 09/24/2023.      CT-CEREBRAL PERFUSION ANALYSIS   Final Result      1.  Cerebral blood flow less than 30% likely representing completed infarct = 28 mL.      2.  T Max more than 6 seconds likely representing combination of completed infarct and ischemia = 44 mL.      3.  Mismatched volume likely representing ischemic brain/penumbra = 16 mL      4.  Please note that the cerebral perfusion was performed on the limited brain tissue around the basal ganglia region. Infarct/ischemia outside the CT perfusion sections can be missed in this study.      CT-HEAD W/O   Final Result         NO ACUTE ABNORMALITIES ARE NOTED ON CT SCAN OF THE HEAD.      Findings are consistent with atrophy.  Decreased attenuation in the periventricular white matter likely indicates microvascular ischemic disease.         DX-CHEST-PORTABLE (1 VIEW)   Final Result         1.  Possible  scarring or atelectasis in the left lung base. Aspiration is also possible.      2.  No other infiltrates or consolidations identified.      IR-NEURO INTERVENTIONAL CONSULT-IP    (Results Pending)       Assessment and Plan:  Zane Galeano is a 63 year old man with multiple vascular risk factors, history of recurrent strokes/TIAs, presenting with dysphagia and expressive aphasia.  CTA of head with findings consistent with bilateral, obliterative arteriopathy of the MCAs consistent with moyamoya disease/syndrome.  From a secondary stroke prevention standpoint, recommend long-term anticoagulation given history of paroxysmal atrial fibrillation, statin therapy, and BP control.  MRI brain confirms left MCA ischemia- likely from collateral failure and poor cerebral reserves.  Will set up on elective basis with NSG for indirect bypass to promote collaterogenesis and improve cerebral reserves to mitigate future ischemic risk related to moyamoya-related hypoperfusion.    Problem list:   Dysphagia, aphasia   Bilateral obliterative arteriopathy of the MCAs  Hypertension  Diabetes  Hyperlipidemia    Plan:   - q4h neurochecks/NIHSS   - long-term BP goal is 110-130/60-80- ok to restart home anti-HTN. Avoid hypotension   - continue apixaban 5mg BID given history of atrial fibrillation   - stroke labs:  HgbA1c 7.5, LDL 48   - continue home atorvastatin 40mg daily for goal LDL < 70   - DM management for goal HgbA1c < 7   - may defer TTE and ziopatch monitoring given known Afib history and findings will not change the above medical management   - PT/OT/SLP   - needs outpatient follow up with Neuro-IR for diagnostic angiogram to identify potential grafts for EDAS.  This should be completed in the 2-3 weeks timeframe   - following catheter angiogram, will need follow up with Dr. Emili Allison with [facility] to undergo indirect bypass (EDAS)- this follow up should be completed in the 4-6 week timeframe.    The evaluation of the  patient, and recommended management, was discussed with Dr. Dangelo,  attending hospitalist. I have performed a physical exam and reviewed and updated ROS and Plan today (9/26/2023).     Yung Ferrari MD  Neurohospitalist, Acute Care Services

## 2023-09-26 NOTE — THERAPY
"Occupational Therapy   Initial Evaluation     Patient Name: Zane Galeano  Age:  63 y.o., Sex:  male  Medical Record #: [id number]  Today's Date: 9/26/2023     Precautions  Precautions: Fall Risk, Swallow Precautions  Comments: aphasia, dysarthria    Assessment    Patient is 63 y.o. male admitted for CVA workup. Other pertinent medical history includes prior CVA causing aphasia, DM, HTN, and a-fib. Pt seen for OT evaluation. Pt donned/doffed socks with max A and stood to wash hands with min A. Pt noted to have impairments to ROM in B UE (see grid for details). Pt reported that he is moving in with his brother who he assists sometimes and who also frequently falls. Pt current functional performance limited by impaired activity tolerance, impaired strength, impaired coordination, and weakness. Pt will continue to benefit from skilled OT while admitted to acute care.     Plan    Occupational Therapy Initial Treatment Plan   Treatment Interventions: Self Care / Activities of Daily Living, Adaptive Equipment, Cognitive Skill Development, Neuro Re-Education / Balance, Therapeutic Exercises, Therapeutic Activity  Treatment Frequency: 3 Times per Week  Duration: Until Therapy Goals Met    DC Equipment Recommendations: Unable to determine at this time  Discharge Recommendations: Recommend post-acute placement for additional occupational therapy services prior to discharge home (If pt refuses, recommend )     Subjective    \"I can't stand from that toilet. Impossible.\" \"I am pretty sure that I help my brother more than he helps me.\"     Objective     09/26/23 0941   Prior Living Situation   Prior Services Home-Independent   Housing / Facility 1 Story House   Steps Into Home 0   Steps In Home 0   Bathroom Set up Walk In Shower;Shower Chair  (somewhat uncertain of setup)   Equipment Owned Tub / Shower Seat  (Thinks his brother's toilet is raised.)   Lives with - Patient's Self Care Capacity Sibling   Comments Pt is moving in with " his brother who pt reported frequently falls. Pt reported that his brother will be able to assist but may be limited.   Prior Level of ADL Function   Self Feeding Independent   Grooming / Hygiene Independent   Bathing Independent   Dressing Requires Assist  (assist if wearing non slip on shoes or socks, otherwise I)   Toileting Independent   Prior Level of IADL Function   Medication Management Independent   Finances Independent   Home Management Independent   Shopping Requires Assist   Prior Level Of Mobility Independent Without Device in Community;Independent Without Device in Home   Precautions   Precautions Fall Risk;Swallow Precautions   Pain   Pain Scales 0 to 10 Scale    Pain 0 - 10 Group   Therapist Pain Assessment Post Activity Pain Same as Prior to Activity;Nurse Notified;0   Cognition    Cognition / Consciousness X   Speech/ Communication Dysarthric;Expressive Aphasia   Level of Consciousness Alert   Ability To Follow Commands 2 Step   Comments Pleasant and cooperative. Pt reported feeling very tired at time of eval. Able to write comprehensible notes.   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 Able to flex with activity to ~45 degrees. Elbow and wrist flexion/extension WDL bilaterally. Noted to have chronically flexed middle fingers on B UE.   Strength Upper Body   Comments Grossly 3+/5   Sensation Upper Body   Upper Extremity Sensation    (denied numbness/tingling in UE)   Upper Body Muscle Tone   Upper Body Muscle Tone  WDL   Coordination Upper Body   Comments Impaired bilaterally   Balance Assessment   Sitting Balance (Static) Fair +   Sitting Balance (Dynamic) Fair +   Standing Balance (Static) Fair   Standing Balance (Dynamic) Fair -   Weight Shift Sitting Fair   Weight Shift Standing Fair   Comments w/ FWW   Bed Mobility    Supine to Sit Supervised   Sit to Supine Supervised   Scooting Supervised   Rolling Supervised   Comments HOB slightly elevated, no use of  "rails   ADL Assessment   Grooming Minimal Assist;Standing  (washed hands/face at sink)   Lower Body Dressing Maximal Assist  (don/doff socks)   Toileting   (refused)   Functional Mobility   Sit to Stand Standby Assist   Bed, Chair, Wheelchair Transfer Standby Assist   Toilet Transfers Refused   Mobility EOB>bathroom>bed   Comments w/ FWW   Visual Perception   Visual Perception    (declined changes)   Activity Tolerance   Sitting in Chair NT   Sitting Edge of Bed >5 min   Standing >5 min   Comments reported fatigue throughout   Patient / Family Goals   Patient / Family Goal #1 \"I want to be able to get on and off the toilet on my own.\"   Short Term Goals   Short Term Goal # 1 Pt will perform ADL transfer w/ supv   Short Term Goal # 2 Pt will perform LB dressing w/ supv and AE PRN   Short Term Goal # 3 Pt will perform standing g/h w/ supv   Education Group   Education Provided Role of Occupational Therapist;Activities of Daily Living   Role of Occupational Therapist Patient Response Patient;Acceptance;Explanation;Verbal Demonstration   ADL Patient Response Patient;Acceptance;Explanation;Demonstration;Verbal Demonstration;Action Demonstration   Occupational Therapy Initial Treatment Plan    Treatment Interventions Self Care / Activities of Daily Living;Adaptive Equipment;Cognitive Skill Development;Neuro Re-Education / Balance;Therapeutic Exercises;Therapeutic Activity   Treatment Frequency 3 Times per Week   Duration Until Therapy Goals Met   Problem List   Problem List Decreased Active Daily Living Skills;Decreased Homemaking Skills;Decreased Upper Extremity Strength Right;Decreased Upper Extremity Strength Left;Decreased Upper Extremity AROM Right;Decreased Upper Extremity AROM Left;Decreased Functional Mobility;Decreased Activity Tolerance;Safety Awareness Deficits / Cognition;Impaired Coordination Right Upper Extremity;Impaired Coordination Left Upper Extremity;Impaired Cognitive Function;Impaired Postural Control " / Balance

## 2023-09-26 NOTE — THERAPY
Physical Therapy   Initial Evaluation     Patient Name: Zane Galeano  Age:  63 y.o., Sex:  male  Medical Record #: [id number]  Today's Date: 9/26/2023     Precautions  Precautions: (P) Fall Risk;Swallow Precautions  Comments: (P) aphasia, dysarthria    Assessment  Patient is 63 y.o. male with a diagnosis of acute CVA. PMH includes previous CVA resulting in aphasia.     Pt tolerated session well. He ambulated over 600' with FWW and SBA, no losses of balance noted. Pt ambultaes with bilaterally flexed knees and decreased hip extension at terminal stance bilaterally, he reports this is his normal gait pattern. Pt did not have any losses of balance and demonstrated toilet transfer with SBA. Pt is appropriate for multiple bouts of ambulation with RN staff during hospital and was educated to do so.     Pt will benefit from a FWW prior to discharge home with home health PT services.     Plan    Physical Therapy Initial Treatment Plan   Duration: (P) Evaluation only    DC Equipment Recommendations: (P) Front-Wheel Walker  Discharge Recommendations: (P) Recommend home health for continued physical therapy services       Subjective    Pt is dysarthric however comprehensible. He is pleasant and cooperative.      Objective       09/26/23 0826   Precautions   Precautions Fall Risk;Swallow Precautions   Comments aphasia, dysarthria   Pain 0 - 10 Group   Location Foot   Location Orientation Left;Right   Pain Rating Scale (NPRS) 4   Prior Living Situation   Housing / Facility 1 Story House   Steps Into Home 0   Steps In Home 0   Equipment Owned None   Lives with - Patient's Self Care Capacity Sibling   Comments Pt reports that he lives with his brother in [place]. This is a one level house with no stairs.   Prior Level of Functional Mobility   Bed Mobility Independent   Transfer Status Independent   Ambulation Independent   Ambulation Distance household   Assistive Devices Used None   Stairs Independent   Passive ROM Lower Body   Passive  ROM Lower Body WDL   Active ROM Lower Body    Active ROM Lower Body  WDL   Strength Lower Body   Lower Body Strength  WDL   Comments No L vs R sided strength differences during MMT   Bed Mobility    Supine to Sit Supervised   Gait Analysis   Gait Level Of Assist Standby Assist   Assistive Device Front Wheel Walker   Distance (Feet) 600   # of Times Distance was Traveled 1   Deviation Decreased Base Of Support;Decreased Heel Strike   Functional Mobility   Sit to Stand Standby Assist   Bed, Chair, Wheelchair Transfer Standby Assist   Toilet Transfers Standby Assist   How much difficulty does the patient currently have...   Turning over in bed (including adjusting bedclothes, sheets and blankets)? 4   Sitting down on and standing up from a chair with arms (e.g., wheelchair, bedside commode, etc.) 4   Moving from lying on back to sitting on the side of the bed? 4   How much help from another person does the patient currently need...   Moving to and from a bed to a chair (including a wheelchair)? 4   Need to walk in a hospital room? 4   Climbing 3-5 steps with a railing? 2   6 clicks Mobility Score 22   Education Group   Education Provided Role of Physical Therapist   Role of Physical Therapist Patient Response Patient;Acceptance;Explanation;Verbal Demonstration   Physical Therapy Initial Treatment Plan    Duration Evaluation only   Problem List    Problems Impaired Balance;Decreased Activity Tolerance  (baseline problems)   Anticipated Discharge Equipment and Recommendations   DC Equipment Recommendations Front-Wheel Walker   Discharge Recommendations Recommend home health for continued physical therapy services   Interdisciplinary Plan of Care Collaboration   IDT Collaboration with  Nursing   Patient Position at End of Therapy Seated;Bed Alarm On;Chair Alarm On;Phone within Reach;Tray Table within Reach;Call Light within Reach   Session Information   Date / Session Number  9/26-1 (dc needs )

## 2023-09-26 NOTE — CARE PLAN
The patient is Stable - Low risk of patient condition declining or worsening    Shift Goals  Clinical Goals: Monitor neuro status, pain management, safety  Patient Goals: Rest, comfort  Family Goals: SHARATH    Progress made toward(s) clinical / shift goals:     Problem: Neuro Status  Goal: Neuro status will remain stable or improve  Outcome: Progressing  Note: Q4 hour neuro checks in place. Patient is alert and oriented x4 with moderate aphasia and dysarthria. Neuro status remained stable.     Problem: Risk for Aspiration  Goal: Patient's risk for aspiration will be absent or decrease  Outcome: Progressing  Note: Assessed for signs and symptoms of aspiration. Patient is on a 1:1 supervised diet with puree foods. Ensured head of bed was at 90 degrees when eating and encouraged small bites.     Problem: Fall Risk  Goal: Patient will remain free from falls  Outcome: Progressing  Note: Patient was assessed to be a high fall risk. Patient room is close to the nursing station, bed alarm is on, bed is in the low position and locked, and call light is within reach.      Problem: Pain - Standard  Goal: Alleviation of pain or a reduction in pain to the patient’s comfort goal  Outcome: Progressing  Note: Patient complained of 8/10 pain in his feet. Administered PRN pain medication and encouraged frequent repositioning. Patient stated relief from pain and rested comfortably.      Patient is not progressing towards the following goals:    Problem: Mobility - Stroke  Goal: Patient's capacity to carry out activities will improve  Outcome: Not Progressing  Note: Patient is unsteady when walking. Ensured patient had staff with him at all times will ambulating.

## 2023-09-26 NOTE — PROGRESS NOTES
Monitor summary: SR/SB 55-81, WV 0.17, QRS 0.09, QT 0.40, A-idio runs, with PVC/big/coup/trig per strip from monitor room.

## 2023-09-26 NOTE — DISCHARGE PLANNING
Case Management Discharge Planning    Admission Date: 9/23/2023  GMLOS: 2.9  ALOS: 2    6-Clicks ADL Score: 16  6-Clicks Mobility Score: 22  PT and/or OT Eval ordered: Yes  Post-acute Referrals Ordered: Yes  Post-acute Choice Obtained: No  Has referral(s) been sent to post-acute provider:  No      Anticipated Discharge Dispo: Discharge Disposition: Disch to IP rehab facility or distinct part unit (62)  Discharge Address: [address] [place] [address] [place] [address]    DME Needed: No    Action(s) Taken: DC Assessment Complete (See below)    LSW met with pt at bedside to complete DC assessment. Pt A&Ox4 and able to verify the information on the face sheet. Pt lives with his brother Daniel ([phone]) in a single-story house. Pt states he does not have a PCP. Prior to this hospitalization pt was independent at home with ADLs and most IADLs. Pt states he does not use any DME at baseline. Pt denies any SA or MH concerns. Pt does not have an advance directive.    LSW discussed the pt DC plan with the pt. Pt states that he is agreeable to post-acute therapy at either acute rehab or SNF but he wants to discuss it with his brother before giving choice. LSW will follow up with the pt tomorrow.     Escalations Completed: None    Medically Clear: No    Next Steps:  f/u with pt and medical team to discuss dc needs and barriers.     Barriers to Discharge: Medical clearance and Pending Placement    Care Transition Team Assessment    Information Source  Orientation Level: Oriented X4  Information Given By: Patient  Who is responsible for making decisions for patient? : Patient    Readmission Evaluation  Is this a readmission?: No    Elopement Risk  Legal Hold: No  Ambulatory or Self Mobile in Wheelchair: Yes  Disoriented: No  Psychiatric Symptoms: None  History of Wandering: No  Elopement this Admit: No  Vocalizing Wanting to Leave: No  Displays Behaviors, Body Language Wanting to Leave: No-Not at Risk for Elopement    Interdisciplinary  Discharge Planning  Lives with - Patient's Self Care Capacity: Sibling  Patient or legal guardian wants to designate a caregiver: No  Support Systems: Friends / Neighbors  Housing / Facility: 1 Story House  Prior Services: Home-Independent  Durable Medical Equipment: Not Applicable    Discharge Preparedness  What is your plan after discharge?: Skilled nursing facility  What are your discharge supports?: Sibling  Prior Functional Level: Ambulatory, Independent with Activities of Daily Living, Independent with Medication Management    Functional Assesment  Prior Functional Level: Ambulatory, Independent with Activities of Daily Living, Independent with Medication Management    Finances  Financial Barriers to Discharge: No  Prescription Coverage: Yes    Vision / Hearing Impairment  Vision Impairment : Yes  Right Eye Vision: Wears Glasses  Left Eye Vision: Wears Glasses  Hearing Impairment : No      Advance Directive  Advance Directive?: None    Domestic Abuse  Have you ever been the victim of abuse or violence?: No  Physical Abuse or Sexual Abuse: No  Verbal Abuse or Emotional Abuse: No  Possible Abuse/Neglect Reported to:: Not Applicable    Psychological Assessment  History of Substance Abuse: None  History of Psychiatric Problems: No  Non-compliant with Treatment: No    Discharge Risks or Barriers  Patient risk factors: No PCP    Anticipated Discharge Information  Discharge Disposition: Disch to  rehab facility or distinct part unit (62)  Discharge Address: [address] [place] [address] [address] [place] [address]

## 2023-09-27 ENCOUNTER — HOSPITAL ENCOUNTER (INPATIENT)
Facility: REHABILITATION | Age: 63
End: 2023-09-27
Attending: PHYSICAL MEDICINE & REHABILITATION | Admitting: PHYSICAL MEDICINE & REHABILITATION
Payer: COMMERCIAL

## 2023-09-27 LAB
ANION GAP SERPL CALC-SCNC: 10 MMOL/L (ref 7–16)
BUN SERPL-MCNC: 15 MG/DL (ref 8–22)
CALCIUM SERPL-MCNC: 8.6 MG/DL (ref 8.5–10.5)
CHLORIDE SERPL-SCNC: 109 MMOL/L (ref 96–112)
CO2 SERPL-SCNC: 23 MMOL/L (ref 20–33)
CREAT SERPL-MCNC: 1.15 MG/DL (ref 0.5–1.4)
EKG IMPRESSION: NORMAL
ERYTHROCYTE [DISTWIDTH] IN BLOOD BY AUTOMATED COUNT: 43.8 FL (ref 35.9–50)
GFR SERPLBLD CREATININE-BSD FMLA CKD-EPI: 71 ML/MIN/1.73 M 2
GLUCOSE BLD STRIP.AUTO-MCNC: 172 MG/DL (ref 65–99)
GLUCOSE BLD STRIP.AUTO-MCNC: 174 MG/DL (ref 65–99)
GLUCOSE BLD STRIP.AUTO-MCNC: 202 MG/DL (ref 65–99)
GLUCOSE BLD STRIP.AUTO-MCNC: 205 MG/DL (ref 65–99)
GLUCOSE SERPL-MCNC: 200 MG/DL (ref 65–99)
HCT VFR BLD AUTO: 31.5 % (ref 42–52)
HGB BLD-MCNC: 10.1 G/DL (ref 14–18)
MCH RBC QN AUTO: 27.2 PG (ref 27–33)
MCHC RBC AUTO-ENTMCNC: 32.1 G/DL (ref 32.3–36.5)
MCV RBC AUTO: 84.7 FL (ref 81.4–97.8)
PLATELET # BLD AUTO: 337 K/UL (ref 164–446)
PMV BLD AUTO: 9.3 FL (ref 9–12.9)
POTASSIUM SERPL-SCNC: 3.7 MMOL/L (ref 3.6–5.5)
RBC # BLD AUTO: 3.72 M/UL (ref 4.7–6.1)
SODIUM SERPL-SCNC: 142 MMOL/L (ref 135–145)
WBC # BLD AUTO: 10.5 K/UL (ref 4.8–10.8)

## 2023-09-27 PROCEDURE — 80048 BASIC METABOLIC PNL TOTAL CA: CPT

## 2023-09-27 PROCEDURE — 93005 ELECTROCARDIOGRAM TRACING: CPT

## 2023-09-27 PROCEDURE — 36415 COLL VENOUS BLD VENIPUNCTURE: CPT

## 2023-09-27 PROCEDURE — A9270 NON-COVERED ITEM OR SERVICE: HCPCS | Performed by: STUDENT IN AN ORGANIZED HEALTH CARE EDUCATION/TRAINING PROGRAM

## 2023-09-27 PROCEDURE — 700102 HCHG RX REV CODE 250 W/ 637 OVERRIDE(OP): Performed by: STUDENT IN AN ORGANIZED HEALTH CARE EDUCATION/TRAINING PROGRAM

## 2023-09-27 PROCEDURE — A9270 NON-COVERED ITEM OR SERVICE: HCPCS

## 2023-09-27 PROCEDURE — 700102 HCHG RX REV CODE 250 W/ 637 OVERRIDE(OP): Performed by: HOSPITALIST

## 2023-09-27 PROCEDURE — 700102 HCHG RX REV CODE 250 W/ 637 OVERRIDE(OP)

## 2023-09-27 PROCEDURE — 99223 1ST HOSP IP/OBS HIGH 75: CPT | Performed by: PHYSICAL MEDICINE & REHABILITATION

## 2023-09-27 PROCEDURE — A9270 NON-COVERED ITEM OR SERVICE: HCPCS | Performed by: HOSPITALIST

## 2023-09-27 PROCEDURE — 99232 SBSQ HOSP IP/OBS MODERATE 35: CPT | Performed by: STUDENT IN AN ORGANIZED HEALTH CARE EDUCATION/TRAINING PROGRAM

## 2023-09-27 PROCEDURE — 99231 SBSQ HOSP IP/OBS SF/LOW 25: CPT | Performed by: PSYCHIATRY & NEUROLOGY

## 2023-09-27 PROCEDURE — 85027 COMPLETE CBC AUTOMATED: CPT

## 2023-09-27 PROCEDURE — 97535 SELF CARE MNGMENT TRAINING: CPT

## 2023-09-27 PROCEDURE — 93010 ELECTROCARDIOGRAM REPORT: CPT | Performed by: INTERNAL MEDICINE

## 2023-09-27 PROCEDURE — 770020 HCHG ROOM/CARE - TELE (206)

## 2023-09-27 PROCEDURE — 82962 GLUCOSE BLOOD TEST: CPT | Mod: 91

## 2023-09-27 RX ORDER — INSULIN LISPRO 100 [IU]/ML
1-6 INJECTION, SOLUTION INTRAVENOUS; SUBCUTANEOUS
Status: DISCONTINUED | OUTPATIENT
Start: 2023-09-27 | End: 2023-10-04 | Stop reason: HOSPADM

## 2023-09-27 RX ORDER — DEXTROSE MONOHYDRATE 25 G/50ML
25 INJECTION, SOLUTION INTRAVENOUS
Status: DISCONTINUED | OUTPATIENT
Start: 2023-09-27 | End: 2023-09-27

## 2023-09-27 RX ORDER — DEXTROSE MONOHYDRATE 25 G/50ML
25 INJECTION, SOLUTION INTRAVENOUS
Status: DISCONTINUED | OUTPATIENT
Start: 2023-09-27 | End: 2023-10-04 | Stop reason: HOSPADM

## 2023-09-27 RX ORDER — INSULIN LISPRO 100 [IU]/ML
1-6 INJECTION, SOLUTION INTRAVENOUS; SUBCUTANEOUS
Status: DISCONTINUED | OUTPATIENT
Start: 2023-09-27 | End: 2023-09-27

## 2023-09-27 RX ADMIN — APIXABAN 5 MG: 5 TABLET, FILM COATED ORAL at 16:59

## 2023-09-27 RX ADMIN — INSULIN LISPRO 2 UNITS: 100 INJECTION, SOLUTION INTRAVENOUS; SUBCUTANEOUS at 12:19

## 2023-09-27 RX ADMIN — OXYCODONE HYDROCHLORIDE 5 MG: 5 TABLET ORAL at 07:50

## 2023-09-27 RX ADMIN — LOSARTAN POTASSIUM 25 MG: 50 TABLET, FILM COATED ORAL at 05:57

## 2023-09-27 RX ADMIN — INSULIN LISPRO 1 UNITS: 100 INJECTION, SOLUTION INTRAVENOUS; SUBCUTANEOUS at 16:57

## 2023-09-27 RX ADMIN — INSULIN LISPRO 1 UNITS: 100 INJECTION, SOLUTION INTRAVENOUS; SUBCUTANEOUS at 07:48

## 2023-09-27 RX ADMIN — AMLODIPINE BESYLATE 10 MG: 5 TABLET ORAL at 05:57

## 2023-09-27 RX ADMIN — ATORVASTATIN CALCIUM 40 MG: 40 TABLET, FILM COATED ORAL at 16:59

## 2023-09-27 RX ADMIN — APIXABAN 5 MG: 5 TABLET, FILM COATED ORAL at 05:57

## 2023-09-27 RX ADMIN — INSULIN LISPRO 2 UNITS: 100 INJECTION, SOLUTION INTRAVENOUS; SUBCUTANEOUS at 21:21

## 2023-09-27 RX ADMIN — OXYCODONE HYDROCHLORIDE 5 MG: 5 TABLET ORAL at 21:23

## 2023-09-27 RX ADMIN — SCOPOLAMINE 1 PATCH: 1.5 PATCH, EXTENDED RELEASE TRANSDERMAL at 02:12

## 2023-09-27 ASSESSMENT — COGNITIVE AND FUNCTIONAL STATUS - GENERAL
DRESSING REGULAR LOWER BODY CLOTHING: A LITTLE
PERSONAL GROOMING: A LITTLE
DRESSING REGULAR UPPER BODY CLOTHING: A LITTLE
TOILETING: A LITTLE
DAILY ACTIVITIY SCORE: 19
SUGGESTED CMS G CODE MODIFIER DAILY ACTIVITY: CK
HELP NEEDED FOR BATHING: A LITTLE

## 2023-09-27 ASSESSMENT — PAIN DESCRIPTION - PAIN TYPE
TYPE: ACUTE PAIN

## 2023-09-27 ASSESSMENT — ENCOUNTER SYMPTOMS
SHORTNESS OF BREATH: 0
DIARRHEA: 0
SPEECH CHANGE: 1
ABDOMINAL PAIN: 0
CONSTIPATION: 0
CHILLS: 0
NAUSEA: 0
BLURRED VISION: 0
FOCAL WEAKNESS: 0
SENSORY CHANGE: 0
VOMITING: 0
FEVER: 0
MYALGIAS: 0

## 2023-09-27 ASSESSMENT — FIBROSIS 4 INDEX: FIB4 SCORE: 0.33

## 2023-09-27 NOTE — PROGRESS NOTES
"Hospital Medicine Daily Progress Note    Date of Service  9/27/2023    Chief Complaint  Zane Galeano is a 63 y.o. male admitted 9/23/2023 with aphasia    Hospital Course  Zane Galeano is a 62 y.o. male who presented 9/23/2023 with aphasia.  This is a pleasant man who reports a past history of 1 \"massive stroke\" 2 years ago after which she had aphasia which improved, and 3 TIAs with the most recent one 6 months ago.  Also reports a history of A-fib not on any medications and does not give a clear reason for this, as well as diabetes and chronic kidney disease.      He presents due to sudden onset aphasia after waking up from a nap at around 230 yesterday afternoon.  Last known normal was around noon when he went to bed from a nap and woke up and was unable to speak.  Apparently after that they went out for dinner and he choked on a hamburger which is what brought him into the ED.  Patient was able to dislodge the hamburger.      Given his new onset aphasia work-up for stroke was initiated, CT perfusion scan did show a penumbra of 16 mL, and CTA of the head showed M1 occlusions bilaterally with collaterals.  Neurology was consulted, Dr. Guillen, did not recommend any emergent interventions, recommended admission for MRI and speech therapy and general stroke management.  Patient was given aspirin.    Interval Problem Update  Pt comfortable this morning aphasia stable, BPs closer to goal will monitor may need further losartan increase, increase lantus dose closer to home dose as BG above goal, tele showing PVC and idioventricular rhythm intermittently pt w/o symptoms of ischemia and lytes wnl and echo 2 days ago essentially wnl, ipr evaluating awaiting insurance auth      I have discussed this patient's plan of care and discharge plan at IDT rounds today with Case Management, Nursing, Nursing leadership, and other members of the IDT team.    Consultants/Specialty  Vascular neurology    Code Status  Full " Code    Disposition  The patient is not medically cleared for discharge to home or a post-acute facility.  Anticipate discharge to: skilled nursing facility    I have placed the appropriate orders for post-discharge needs.    Review of Systems  Review of Systems   Constitutional:  Negative for chills and fever.   HENT:  Negative for congestion.    Eyes:  Negative for blurred vision.   Respiratory:  Negative for shortness of breath.    Cardiovascular:  Negative for chest pain and leg swelling.   Gastrointestinal:  Negative for abdominal pain, constipation, diarrhea, nausea and vomiting.   Genitourinary:  Negative for dysuria.   Musculoskeletal:  Negative for myalgias.   Skin:  Negative for rash.   Neurological:  Positive for speech change. Negative for sensory change and focal weakness.        Physical Exam  Temp:  [36.6 °C (97.9 °F)-37.1 °C (98.7 °F)] 36.6 °C (97.9 °F)  Pulse:  [61-78] 78  Resp:  [16-17] 16  BP: (118-139)/() 138/76  SpO2:  [94 %-97 %] 96 %    Physical Exam  Constitutional:       Appearance: Normal appearance.   HENT:      Head: Normocephalic and atraumatic.      Nose: Nose normal.      Mouth/Throat:      Mouth: Mucous membranes are moist.      Pharynx: Oropharynx is clear.   Eyes:      Conjunctiva/sclera: Conjunctivae normal.      Pupils: Pupils are equal, round, and reactive to light.   Cardiovascular:      Rate and Rhythm: Normal rate and regular rhythm.      Heart sounds: No murmur heard.  Pulmonary:      Effort: Pulmonary effort is normal.      Breath sounds: No wheezing, rhonchi or rales.   Abdominal:      General: There is no distension.      Palpations: Abdomen is soft.      Tenderness: There is no abdominal tenderness. There is no guarding or rebound.   Musculoskeletal:         General: No swelling or tenderness.      Cervical back: Normal range of motion and neck supple.   Skin:     General: Skin is warm and dry.   Neurological:      Mental Status: He is alert and oriented to person,  place, and time.      GCS: GCS eye subscore is 4. GCS verbal subscore is 5. GCS motor subscore is 6.      Cranial Nerves: Dysarthria and facial asymmetry present.   Psychiatric:         Mood and Affect: Mood normal.         Behavior: Behavior normal.         Fluids    Intake/Output Summary (Last 24 hours) at 9/27/2023 1656  Last data filed at 9/27/2023 1400  Gross per 24 hour   Intake 480 ml   Output --   Net 480 ml         Laboratory  Recent Labs     09/25/23 0314 09/27/23 0413   WBC 6.8 10.5   RBC 3.44* 3.72*   HEMOGLOBIN 9.6* 10.1*   HEMATOCRIT 28.9* 31.5*   MCV 84.0 84.7   MCH 27.9 27.2   MCHC 33.2 32.1*   RDW 43.4 43.8   PLATELETCT 245 337   MPV 9.1 9.3       Recent Labs     09/25/23 0314 09/27/23 0413   SODIUM 141 142   POTASSIUM 3.4* 3.7   CHLORIDE 108 109   CO2 21 23   GLUCOSE 169* 200*   BUN 13 15   CREATININE 0.94 1.15   CALCIUM 8.3* 8.6                         Imaging  EC-ECHOCARDIOGRAM COMPLETE W/O CONT   Final Result      MR-BRAIN-W/O   Final Result      1.  Small areas of acute infarcts in the left parietal lobe.   2.  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 The blood vessels better evaluated on the recent the CT angiogram.   3.  Chronic infarct in the right temporal and parietal lobes.   4.  Mild chronic microvascular ischemic disease.   5.  Mild cerebral volume loss.      DX-ESOPHAGUS - [id number]   Final Result      CT-CTA NECK WITH & W/O-POST PROCESSING   Final Result      CT angiogram of the neck within normal limits.      CT-CTA HEAD WITH & W/O-POST PROCESS   Final Result      1.  Occlusion of the M1 segments of the middle cerebral artery is noted bilaterally. These occlusions are likely chronic and collaterals are present as described above.      2.  No other occlusion or filling defect identified.      3.  These findings were discussed with DEVON MEJÍA on 09/24/2023.      CT-CEREBRAL PERFUSION ANALYSIS   Final  Result      1.  Cerebral blood flow less than 30% likely representing completed infarct = 28 mL.      2.  T Max more than 6 seconds likely representing combination of completed infarct and ischemia = 44 mL.      3.  Mismatched volume likely representing ischemic brain/penumbra = 16 mL      4.  Please note that the cerebral perfusion was performed on the limited brain tissue around the basal ganglia region. Infarct/ischemia outside the CT perfusion sections can be missed in this study.      CT-HEAD W/O   Final Result         NO ACUTE ABNORMALITIES ARE NOTED ON CT SCAN OF THE HEAD.      Findings are consistent with atrophy.  Decreased attenuation in the periventricular white matter likely indicates microvascular ischemic disease.         DX-CHEST-PORTABLE (1 VIEW)   Final Result         1.  Possible scarring or atelectasis in the left lung base. Aspiration is also possible.      2.  No other infiltrates or consolidations identified.             Assessment/Plan  * Acute CVA (cerebrovascular accident) (HCC)- (present on admission)  Assessment & Plan  With aphemia. Unable to form words, however full comprehension and preserved written language fluency.  History of 1 stroke and 3 TIAs per patient, stroke 2 years ago left him with aphasia that improved back to normal apparently.  MRI showed Small areas of acute infarcts in the left parietal lobe and signs concerning for Moyamoya  Echo showed normal LVEF and RVEF with mild MR  -Goal normotension.   -PT/OT/SLP  -Eliquis and statin  -Neurology consulted, recommend follow up with Neuro-IR within 2-3 weeks for diagnostic angiogram to identify potential grafts for EDAS following this will need follow up with Dr. Emili Allison with [facility] within 4-6 weeks to undergo indirect bypass (EDAS)    Hypertension- (present on admission)  Assessment & Plan  Goal normotension   home amlodipine, added losartan  PRN labetalol, with hydralazine as backup if HR low    AF (atrial  fibrillation) (HCC)- (present on admission)  Assessment & Plan  Patient reports history of A-fib.  Not on any medications for this  -Continue Eliquis    Type 2 diabetes mellitus with circulatory disorder, without long-term current use of insulin (HCC)  Assessment & Plan  Hemoglobin A1c 7.5  Insulin basal plus correctional      VTE prophylaxis:    therapeutic anticoagulation with eliquis 5 mg BID      I have performed a physical exam and reviewed and updated ROS and Plan today (9/27/2023). In review of yesterday's note (9/26/2023), there are no changes except as documented above.

## 2023-09-27 NOTE — PROGRESS NOTES
Neurology Progress Note  Neurohospitalist Service, [facility]    Referring Physician: Donato Brice M.D.      Interval History: No acute events overnight.  Insurance does not cover [place]-based providers.    Review of systems: In addition to what is detailed in the HPI and/or updated in the interval history, all other systems reviewed and are negative.    Past Medical History, Past Surgical History and Social History reviewed and unchanged from prior    Medications:    Current Facility-Administered Medications:     [START ON 9/28/2023] insulin GLARGINE (Lantus,Semglee) injection, 20 Units, Subcutaneous, QAM INSULIN **AND** insulin lispro (HumaLOG,AdmeLOG) injection, 1-6 Units, Subcutaneous, 4X/DAY ACHS **AND** POC blood glucose manual result, , , Q6H **AND** NOTIFY MD and PharmD, , , Once **AND** Administer 20 grams of glucose (approximately 8 ounces of fruit juice) every 15 minutes PRN FSBG less than 70 mg/dL, , , PRN **AND** dextrose 50% (D50W) injection 25 g, 25 g, Intravenous, Q15 MIN PRN, ANA KirkO.    losartan (Cozaar) tablet 25 mg, 25 mg, Oral, Q DAY, ROBIN Kirk.O., 25 mg at 09/27/23 0557    apixaban (Eliquis) tablet 5 mg, 5 mg, Oral, BID, Donato Brice M.D., 5 mg at 09/27/23 0557    scopolamine (Transderm-Scop) patch 1 Patch, 1 Patch, Transdermal, Q72HRS, José Barrett M.D., 1 Patch at 09/27/23 0212    hydrALAZINE (Apresoline) injection 10 mg, 10 mg, Intravenous, Q4HRS PRN, Jake Dietz, A.P.R.N., 10 mg at 09/24/23 1505    atropine 1 % ophthalmic solution 2 Drop, 2 Drop, Sublingual, Q4HRS PRN, Jake Dietz, A.P.R.N., 2 Drop at 09/26/23 1531    atorvastatin (Lipitor) tablet 40 mg, 40 mg, Oral, Q EVENING, CORINNA JacksonP.R.N., 40 mg at 09/26/23 5644    Pharmacy Consult Request ...Pain Management Review 1 Each, 1 Each, Other, PHARMACY TO DOSE, BRIAN Jackson.P.R.N.    acetaminophen (Tylenol) tablet 650 mg, 650 mg, Oral, Q6HRS PRN,  "Jake Moultonn, A.P.R.N., 650 mg at 09/26/23 1757    oxyCODONE immediate-release (Roxicodone) tablet 2.5 mg, 2.5 mg, Oral, Q3HRS PRN **OR** oxyCODONE immediate-release (Roxicodone) tablet 5 mg, 5 mg, Oral, Q3HRS PRN, 5 mg at 09/27/23 0750 **OR** HYDROmorphone (Dilaudid) injection 0.25 mg, 0.25 mg, Intravenous, Q3HRS PRN, Jake Orteganin, A.P.R.N.    labetalol (Normodyne/Trandate) injection 10 mg, 10 mg, Intravenous, Q4HRS PRN, Jake Orteganin, A.P.R.N., 10 mg at 09/24/23 1753    amLODIPine (Norvasc) tablet 10 mg, 10 mg, Oral, Q DAY, Jake Moultonn, A.P.R.N., 10 mg at 09/27/23 0557    Physical Examination:   /65   Pulse 61   Temp 36.8 °C (98.2 °F) (Temporal)   Resp 16   Ht 1.803 m (5' 11\")   Wt 64.2 kg (141 lb 8.6 oz)   SpO2 96%   BMI 19.74 kg/m²       General: Patient is awake and in no acute distress  Neck: There is normal range of motion  CV: Regular rate   Extremities:  Warm, dry, and intact, without peripheral lower extremity edema    NEUROLOGICAL EXAM:     Mental status: Awake, alert and fully oriented, follows commands  Speech and language: Speech is dysarthric.  Speech paucity.  Writes well.   Cranial nerve exam:  Visual fields are full. There is no nystagmus. Extraocular muscles are intact. Face is symmetric.   Motor exam: There is sustained antigravity with no downward drift in bilateral arms and legs.   Sensory exam:  Reacts to tactile in all 4 extremities, there is no neglect to double stim  Coordination: No ataxia on bilateral FTN testing      NIHSS: National Institutes of Health Stroke Scale    [0] 1a:Level of Consciousness    0-alert 1-drowsy   2-stupor   3-coma  [0] 1b:LOC Questions                  0-both  1-one      2-neither  [0] 1c:LOC Commands                   0-both  1-one      2-neither  [0] 2: Best Gaze                     0-nl    1-partial  2-forced  [0] 3: Visual Fields                   0-nl    1-partial  2-complete 3-bilat  [0] 4: Facial Paresis                0-nl  "   1-minor    2-partial  3-full  MOTOR                       0-nl  [0] 5: Right Arm           1-drift  [0] 6: Left Arm             2-some effort vs gravity  [0] 7: Right Leg           3-no effort vs gravity  [0] 8: Left Leg             4-no movement                             x-untestable  [0] 9: Limb Ataxia                    0-abs   1-1_limb   2-2+_limbs       x-untestable  [0] 10:Sensory                        0-nl    1-partial  2-dense  [1] 11:Best Language/Aphasia         0-nl    1-mild/mod 2-severe   3-mute  [1] 12:Dysarthria                     0-nl    1-mild/mod 2-severe       x-untestable  [0] 13:Neglect/Inattention            0-none  1-partial  2-complete  [2] TOTAL      Objective Data:    Labs:  Lab Results   Component Value Date/Time    PROTHROMBTM 14.2 09/24/2023 12:23 AM    INR 1.09 09/24/2023 12:23 AM      Lab Results   Component Value Date/Time    WBC 10.5 09/27/2023 04:13 AM    RBC 3.72 (L) 09/27/2023 04:13 AM    HEMOGLOBIN 10.1 (L) 09/27/2023 04:13 AM    HEMATOCRIT 31.5 (L) 09/27/2023 04:13 AM    MCV 84.7 09/27/2023 04:13 AM    MCH 27.2 09/27/2023 04:13 AM    MCHC 32.1 (L) 09/27/2023 04:13 AM    MPV 9.3 09/27/2023 04:13 AM    NEUTSPOLYS 62.80 09/25/2023 03:14 AM    LYMPHOCYTES 21.70 (L) 09/25/2023 03:14 AM    MONOCYTES 7.70 09/25/2023 03:14 AM    EOSINOPHILS 6.20 09/25/2023 03:14 AM    BASOPHILS 0.70 09/25/2023 03:14 AM      Lab Results   Component Value Date/Time    SODIUM 142 09/27/2023 04:13 AM    POTASSIUM 3.7 09/27/2023 04:13 AM    CHLORIDE 109 09/27/2023 04:13 AM    CO2 23 09/27/2023 04:13 AM    GLUCOSE 200 (H) 09/27/2023 04:13 AM    BUN 15 09/27/2023 04:13 AM    CREATININE 1.15 09/27/2023 04:13 AM      Lab Results   Component Value Date/Time    CHOLSTRLTOT 91 (L) 09/23/2023 11:00 PM    LDL 48 09/23/2023 11:00 PM    HDL 27 (A) 09/23/2023 11:00 PM    TRIGLYCERIDE 79 09/23/2023 11:00 PM       Lab Results   Component Value Date/Time    ALKPHOSPHAT 182 (H) 09/25/2023 03:14 AM    ASTSGOT 12  09/25/2023 03:14 AM    ALTSGPT 46 09/25/2023 03:14 AM    TBILIRUBIN 0.3 09/25/2023 03:14 AM        Imaging/Testing:    I interpreted and/or reviewed the patient's neuroimaging    EC-ECHOCARDIOGRAM COMPLETE W/O CONT   Final Result      MR-BRAIN-W/O   Final Result      1.  Small areas of acute infarcts in the left parietal lobe.   2.  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 The blood vessels better evaluated on the recent the CT angiogram.   3.  Chronic infarct in the right temporal and parietal lobes.   4.  Mild chronic microvascular ischemic disease.   5.  Mild cerebral volume loss.      DX-ESOPHAGUS - [id number]   Final Result      CT-CTA NECK WITH & W/O-POST PROCESSING   Final Result      CT angiogram of the neck within normal limits.      CT-CTA HEAD WITH & W/O-POST PROCESS   Final Result      1.  Occlusion of the M1 segments of the middle cerebral artery is noted bilaterally. These occlusions are likely chronic and collaterals are present as described above.      2.  No other occlusion or filling defect identified.      3.  These findings were discussed with DEVON MEJÍA on 09/24/2023.      CT-CEREBRAL PERFUSION ANALYSIS   Final Result      1.  Cerebral blood flow less than 30% likely representing completed infarct = 28 mL.      2.  T Max more than 6 seconds likely representing combination of completed infarct and ischemia = 44 mL.      3.  Mismatched volume likely representing ischemic brain/penumbra = 16 mL      4.  Please note that the cerebral perfusion was performed on the limited brain tissue around the basal ganglia region. Infarct/ischemia outside the CT perfusion sections can be missed in this study.      CT-HEAD W/O   Final Result         NO ACUTE ABNORMALITIES ARE NOTED ON CT SCAN OF THE HEAD.      Findings are consistent with atrophy.  Decreased attenuation in the periventricular white matter likely indicates microvascular  ischemic disease.         DX-CHEST-PORTABLE (1 VIEW)   Final Result         1.  Possible scarring or atelectasis in the left lung base. Aspiration is also possible.      2.  No other infiltrates or consolidations identified.          Assessment and Plan:  Zane Galeano is a 63 year old man with multiple vascular risk factors, history of recurrent strokes/TIAs, presenting with dysphagia and expressive aphasia.  CTA of head with findings consistent with bilateral, obliterative arteriopathy of the MCAs consistent with moyamoya disease/syndrome.  From a secondary stroke prevention standpoint, recommend long-term anticoagulation given history of paroxysmal atrial fibrillation, statin therapy, and BP control.  MRI brain confirms left MCA ischemia- likely from collateral failure and poor cerebral reserves.  Will set up on elective basis with NSG for indirect bypass to promote collaterogenesis and improve cerebral reserves to mitigate future ischemic risk related to moyamoya-related hypoperfusion.  His insurance does not cover [place]-based providers, so please refer to alternative NSG/NeuroIR in [place].    Problem list:   Dysphagia, aphasia   Bilateral obliterative arteriopathy of the MCAs  Hypertension  Diabetes  Hyperlipidemia    Plan:   - q4h neurochecks/NIHSS   - long-term BP goal is 110-130/60-80- ok to restart home anti-HTN. Avoid hypotension   - continue apixaban 5mg BID given history of atrial fibrillation   - stroke labs:  HgbA1c 7.5, LDL 48   - continue home atorvastatin 40mg daily for goal LDL < 70   - DM management for goal HgbA1c < 7   - may defer TTE and ziopatch monitoring given known Afib history and findings will not change the above medical management   - PT/OT/SLP   - needs outpatient follow up with [place]-based Neuro-IR for diagnostic angiogram to identify potential grafts for EDAS.  This should be completed in the 2-3 weeks timeframe   - following catheter angiogram, will need follow up with [place]-based NSG to undergo  indirect bypass (EDAS)- this follow up should be completed in the 4-6 week timeframe (Consider Dr. Shawn Mak with [facility])    The evaluation of the patient, and recommended management, was discussed with Dr. Dangelo,  attending hospitalist. I have performed a physical exam and reviewed and updated ROS and Plan today (9/27/2023).     Yung Ferrari MD  Neurohospitalist, Acute Care Services

## 2023-09-27 NOTE — CARE PLAN
The patient is Stable - Low risk of patient condition declining or worsening    Shift Goals  Clinical Goals: monitor neurological assessments, safety with mobility  Patient Goals: rest  Family Goals: SHARATH    Progress made toward(s) clinical / shift goals:  Patient walks with front wheel walker when ambulating to the bathroom. Rest has been maintained throughout this shift.     Patient is progressing towards the following goals:    Problem: Neuro Status  Goal: Neuro status will remain stable or improve  Outcome: Progressing  Note: Patient has been alert and oriented during this shift. He has participated in all neurological assessments and has had no new deficits during this shift.      Problem: Dysphagia  Goal: Dysphagia will improve  Outcome: Progressing  Note: Patient is able to swallow pills floated in apple sauce.

## 2023-09-27 NOTE — PROGRESS NOTES
"Hospital Medicine Daily Progress Note    Date of Service  9/26/2023    Chief Complaint  Zane Galeano is a 63 y.o. male admitted 9/23/2023 with aphasia    Hospital Course  Zane Galeano is a 62 y.o. male who presented 9/23/2023 with aphasia.  This is a pleasant man who reports a past history of 1 \"massive stroke\" 2 years ago after which she had aphasia which improved, and 3 TIAs with the most recent one 6 months ago.  Also reports a history of A-fib not on any medications and does not give a clear reason for this, as well as diabetes and chronic kidney disease.      He presents due to sudden onset aphasia after waking up from a nap at around 230 yesterday afternoon.  Last known normal was around noon when he went to bed from a nap and woke up and was unable to speak.  Apparently after that they went out for dinner and he choked on a hamburger which is what brought him into the ED.  Patient was able to dislodge the hamburger.      Given his new onset aphasia work-up for stroke was initiated, CT perfusion scan did show a penumbra of 16 mL, and CTA of the head showed M1 occlusions bilaterally with collaterals.  Neurology was consulted, Dr. Guillen, did not recommend any emergent interventions, recommended admission for MRI and speech therapy and general stroke management.  Patient was given aspirin.    Interval Problem Update  MRI brain shows acute stroke and moyamoya will need close outpt f/u described in A/P and Dr Ferrari's note, BP too high started losartan, pt feels well motivated for DC      I have discussed this patient's plan of care and discharge plan at IDT rounds today with Case Management, Nursing, Nursing leadership, and other members of the IDT team.    Consultants/Specialty  Vascular neurology    Code Status  Full Code    Disposition  The patient is not medically cleared for discharge to home or a post-acute facility.  Anticipate discharge to: skilled nursing facility    I have placed the appropriate " orders for post-discharge needs.    Review of Systems  Review of Systems   Constitutional:  Negative for chills and fever.   HENT:  Negative for congestion.    Eyes:  Negative for blurred vision.   Respiratory:  Negative for shortness of breath.    Cardiovascular:  Negative for chest pain and leg swelling.   Gastrointestinal:  Negative for abdominal pain, constipation, diarrhea, nausea and vomiting.   Genitourinary:  Negative for dysuria.   Musculoskeletal:  Negative for myalgias.   Skin:  Negative for rash.   Neurological:  Positive for speech change. Negative for sensory change and focal weakness.        Physical Exam  Temp:  [36.6 °C (97.8 °F)-37 °C (98.6 °F)] 37 °C (98.6 °F)  Pulse:  [54-74] 74  Resp:  [16-19] 18  BP: (115-159)/(62-75) 159/74  SpO2:  [93 %-98 %] 93 %    Physical Exam  Constitutional:       Appearance: Normal appearance.   HENT:      Head: Normocephalic and atraumatic.      Nose: Nose normal.      Mouth/Throat:      Mouth: Mucous membranes are moist.      Pharynx: Oropharynx is clear.   Eyes:      Conjunctiva/sclera: Conjunctivae normal.      Pupils: Pupils are equal, round, and reactive to light.   Cardiovascular:      Rate and Rhythm: Normal rate and regular rhythm.      Heart sounds: No murmur heard.  Pulmonary:      Effort: Pulmonary effort is normal.      Breath sounds: No wheezing, rhonchi or rales.   Abdominal:      General: There is no distension.      Palpations: Abdomen is soft.      Tenderness: There is no abdominal tenderness. There is no guarding or rebound.   Musculoskeletal:         General: No swelling or tenderness.      Cervical back: Normal range of motion and neck supple.   Skin:     General: Skin is warm and dry.   Neurological:      Mental Status: He is alert and oriented to person, place, and time.      GCS: GCS eye subscore is 4. GCS verbal subscore is 5. GCS motor subscore is 6.      Cranial Nerves: Dysarthria and facial asymmetry present.   Psychiatric:         Mood and  Affect: Mood normal.         Behavior: Behavior normal.         Fluids    Intake/Output Summary (Last 24 hours) at 9/26/2023 1721  Last data filed at 9/26/2023 0000  Gross per 24 hour   Intake 50 ml   Output 50 ml   Net 0 ml         Laboratory  Recent Labs     09/23/23  2300 09/25/23  0314   WBC 6.9 6.8   RBC 3.39* 3.44*   HEMOGLOBIN 9.3* 9.6*   HEMATOCRIT 29.4* 28.9*   MCV 86.7 84.0   MCH 27.4 27.9   MCHC 31.6* 33.2   RDW 44.5 43.4   PLATELETCT 254 245   MPV 9.6 9.1       Recent Labs     09/23/23  2300 09/25/23  0314   SODIUM 140 141   POTASSIUM 3.8 3.4*   CHLORIDE 107 108   CO2 22 21   GLUCOSE 261* 169*   BUN 23* 13   CREATININE 1.26 0.94   CALCIUM 8.6 8.3*       Recent Labs     09/24/23  0023   APTT 24.6*   INR 1.09           Recent Labs     09/23/23 2300   TRIGLYCERIDE 79   HDL 27*   LDL 48         Imaging  EC-ECHOCARDIOGRAM COMPLETE W/O CONT   Final Result      MR-BRAIN-W/O   Final Result      1.  Small areas of acute infarcts in the left parietal lobe.   2.  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 The blood vessels better evaluated on the recent the CT angiogram.   3.  Chronic infarct in the right temporal and parietal lobes.   4.  Mild chronic microvascular ischemic disease.   5.  Mild cerebral volume loss.      DX-ESOPHAGUS - [id number]   Final Result      CT-CTA NECK WITH & W/O-POST PROCESSING   Final Result      CT angiogram of the neck within normal limits.      CT-CTA HEAD WITH & W/O-POST PROCESS   Final Result      1.  Occlusion of the M1 segments of the middle cerebral artery is noted bilaterally. These occlusions are likely chronic and collaterals are present as described above.      2.  No other occlusion or filling defect identified.      3.  These findings were discussed with DEVON MEJÍA on 09/24/2023.      CT-CEREBRAL PERFUSION ANALYSIS   Final Result      1.  Cerebral blood flow less than 30% likely representing  completed infarct = 28 mL.      2.  T Max more than 6 seconds likely representing combination of completed infarct and ischemia = 44 mL.      3.  Mismatched volume likely representing ischemic brain/penumbra = 16 mL      4.  Please note that the cerebral perfusion was performed on the limited brain tissue around the basal ganglia region. Infarct/ischemia outside the CT perfusion sections can be missed in this study.      CT-HEAD W/O   Final Result         NO ACUTE ABNORMALITIES ARE NOTED ON CT SCAN OF THE HEAD.      Findings are consistent with atrophy.  Decreased attenuation in the periventricular white matter likely indicates microvascular ischemic disease.         DX-CHEST-PORTABLE (1 VIEW)   Final Result         1.  Possible scarring or atelectasis in the left lung base. Aspiration is also possible.      2.  No other infiltrates or consolidations identified.      IR-NEURO INTERVENTIONAL CONSULT-IP    (Results Pending)          Assessment/Plan  * Acute CVA (cerebrovascular accident) (HCC)- (present on admission)  Assessment & Plan  With aphemia. Unable to form words, however full comprehension and preserved written language fluency.  History of 1 stroke and 3 TIAs per patient, stroke 2 years ago left him with aphasia that improved back to normal apparently.  MRI showed Small areas of acute infarcts in the left parietal lobe and signs concerning for Moyamoya  Echo showed normal LVEF and RVEF with mild MR  -Goal normotension.   -PT/OT/SLP  -Eliquis and statin  -Neurology consulted, recommend follow up with Neuro-IR within 2-3 weeks for diagnostic angiogram to identify potential grafts for EDAS following this will need follow up with Dr. Emili Allison [facility] within 4-6 weeks to undergo indirect bypass (EDAS)    Hypertension- (present on admission)  Assessment & Plan  Goal normotension   home amlodipine, added losartan  PRN labetalol, with hydralazine as backup if HR low    AF (atrial fibrillation)  (HCC)- (present on admission)  Assessment & Plan  Patient reports history of A-fib.  Not on any medications for this  -Continue Eliquis    Type 2 diabetes mellitus with circulatory disorder, without long-term current use of insulin (HCC)  Assessment & Plan  Hemoglobin A1c 7.5  Insulin basal plus correctional      VTE prophylaxis:    therapeutic anticoagulation with eliquis 5 mg BID      I have performed a physical exam and reviewed and updated ROS and Plan today (9/26/2023). In review of yesterday's note (9/25/2023), there are no changes except as documented above.

## 2023-09-27 NOTE — PROGRESS NOTES
Monitor Summary: SB-SR 58-92, OR .0.17, QRS .0.08, QT .0.38 with frequent PVCs and frequent trigem, rare couplets and run of accelerated idio per strip from monitor room

## 2023-09-27 NOTE — PREADMISSION SCREENING NOTE
"  Pre-Admission Screening Form    Patient Information:   Name: Zane Galeano     MRN: [id number]       : 1960      Age: 63 y.o.   Gender: male      Race: White [7]       Marital Status: Single [1]  Family Contact: Araceli Wen        Relationship: Sister [14]  Home Phone:            Cell Phone: [phone]  Advanced Directives: None  Code Status:  FULL  Current Attending Provider: Huber Dangelo D.O.  Referring Physician: Dr. Barrett  Physiatrist Consult: Dr. Barnhart   Referral Date: 23  Primary Payor Source:  BLUE CROSS  Secondary Payor Source:      Medical Information:   Date of Admission to Acute Care [place]20[facility]  Room Number: S187/02  Rehabilitation Diagnosis: 0001.4 - Stroke: No Paresis  Immunization History   Administered Date(s) Administered    Influenza Vaccine Quad Inj (Pf) 10/11/2022    Influenza, Unspecified - HISTORICAL DATA 10/06/2010, 2012    Pneumococcal polysaccharide vaccine (PPSV-23) 2012, 2021    Tdap Vaccine 2012    Zoster Vaccine Live (ZVL) (Zostavax) - HISTORICAL DATA 2021     Allergies   Allergen Reactions    Vancomycin Unspecified     Suspected induced nephropathy      No past medical history on file.  No past surgical history on file.    History Leading to Admission, Conditions that Caused the Need for Rehab (CMS):     Dr. Barrett H&P:  Chief Complaint  Patient presents with   Difficulty Breathing      PT BIBA for a partial airway obstruction. Patient was eating at home and exerienced choking on a hamburger. Patient was able to dislodge some of the obstruction on EMS arrival. Patient dislodged what he felt was the rest of it on arrival to ED.      History of Presenting Illness  Zane Galeano is a 62 y.o. male who presented 2023 with aphasia.  This is a pleasant man who reports a past history of 1 \"massive stroke\" 2 years ago after which she had aphasia which improved, and 3 TIAs with the most recent one 6 months ago.  Also reports a " history of A-fib not on any medications and does not give a clear reason for this, as well as diabetes and chronic kidney disease.  He presents due to sudden onset aphasia after waking up from a nap at around 230 yesterday afternoon.  Last known normal was around noon when he went to bed from a nap and woke up and was unable to speak.  Apparently after that they went out for dinner and he choked on a hamburger which is what brought him into the ED.  Patient was able to dislodge the hamburger.  Given his new onset aphasia work-up for stroke was initiated, CT perfusion scan did show a penumbra of 16 mL, and CTA of the head showed M1 occlusions bilaterally with collaterals.  Neurology was consulted, Dr. Guillen, did not recommend any emergent interventions, recommended admission for MRI and speech therapy and general stroke management.  Patient was given aspirin.     Assessment/Plan:  Justification for Admission Status  I anticipate this patient will require at least two midnights for appropriate medical management, necessitating inpatient admission because acute CVA     Patient will need a Med/Surg bed on EMERGENCY service .  The need is secondary to above.     * Acute CVA (cerebrovascular accident) (HCC)- (present on admission)  Assessment & Plan  With complete expressive aphasia.  Patient has entirely preserved written language fluency and comprehension.  History of 1 stroke and 3 TIAs per patient, stroke 2 years ago left him with aphasia that improved back to normal apparently.  Neurology consulted, Dr. Guillen, did not recommend any emergent interventions, admit for further work-up of stroke management  -Admit to neuro, telemetry monitoring  -MRI brain -patient likely unable to tolerate lying flat due to inability to manage his secretions.  Discussed with radiology, no easy solution of this.  I have ordered scopolamine patch to help decrease secretions, unsure if this will be adequate on its own, may  need to try atropine  -Strict n.p.o., SLP  -Echo with bubble study  -Aspirin, statin  -Lipid panel, A1c  -PT/OT     AF (atrial fibrillation) ([facility])  Assessment & Plan  Patient reports history of A-fib.  Not on any medications for this, anticoagulation nor rate control meds.  Unclear reason why, he denies history of bleeding.  I do not understand the explanation he writes down.  His brother's partner who is with your is planning on bringing in records from his previous hospital stay, though now it appears at least partial records have been loaded into care everywhere, previously not available.      Currently appears to be in sinus rhythm on monitor, awaiting EKG  Here with acute CVA, possibly thromboembolic given his A-fib not on anticoagulation  Start anticoagulation when appropriate, holding off right now in setting of acute CVA     Type 2 diabetes mellitus with circulatory disorder, without long-term current use of insulin ([facility])  Assessment & Plan  Check A1c  Insulin basal plus correctional    VTE prophylaxis: SCDs/TEDs    Dr. Barnhart (Physiatry) recommendations:  ASSESSMENT:  Patient is a 63 y.o. male admitted with dysarthria and dysphagia      [facility] Code / Diagnosis to Support: 0001.4 - Stroke: No Paresis     Rehabilitation: Impaired ADLs and mobility  Patient is a good candidate for inpatient rehab based on needs for OT and SLP      Barriers to transfer include: Insurance authorization, TCCs to verify disposition, medical clearance and bed availability      Additional Recommendations:  L MCA stroke   Prior TIAs  - known history of prior TIAs and prior stroke 2 years ago with deficits of apshasia   - new worsening dysarthria, no lateralizing strength deficits   - MRI brain showed area of L MCA infract likely due to failure of collaterals   - CTA head showing areas of arteriopathy bilaterally consistent with moyamoya   - neuro recommending continuing with current eliquis for AC  - planning for outpatient diagnostic  "Neuro IR to evaluate areas of potential indirect bypass with neurosurgery , Neuro IR planned for 2-3 weeks   - continue with OT/SLP, has minimal PT needs      Afib   - known history of afib   - continue home dose apixabn      DM   - hyperglycemia up to 200  - continue lantus 20 units QAM      HTN   - continue home dose amlodipine, newly on losartan for BP control   - goal  -130      Dispo:  - patient is currently functioning below their level of baseline, recommend post acute rehab  - recommend IRF level therapy with 3hr of therapy 5 days per week  - piror to acceptance to IRF, will need insurance auth from [employer] if patient agreeable to IRF   - TCC to assist with insurance auth and DC support      Medical Complexity:  L MCA syndrome   Prior TIAs  Afib   DM   HTN   Impaired mobility and ADLs      DVT PPX: gt Dangelo progress note:  Date of Service  9/26/2023     Chief Complaint  Zane Galeano is a 63 y.o. male admitted 9/23/2023 with aphasia     Hospital Course  Zane Galeano is a 62 y.o. male who presented 9/23/2023 with aphasia.  This is a pleasant man who reports a past history of 1 \"massive stroke\" 2 years ago after which she had aphasia which improved, and 3 TIAs with the most recent one 6 months ago.  Also reports a history of A-fib not on any medications and does not give a clear reason for this, as well as diabetes and chronic kidney disease.       He presents due to sudden onset aphasia after waking up from a nap at around 230 yesterday afternoon.  Last known normal was around noon when he went to bed from a nap and woke up and was unable to speak.  Apparently after that they went out for dinner and he choked on a hamburger which is what brought him into the ED.  Patient was able to dislodge the hamburger.       Given his new onset aphasia work-up for stroke was initiated, CT perfusion scan did show a penumbra of 16 mL, and CTA of the head showed M1 occlusions bilaterally with " collaterals.  Neurology was consulted, Dr. Guillen, did not recommend any emergent interventions, recommended admission for MRI and speech therapy and general stroke management.  Patient was given aspirin.     Interval Problem Update  MRI brain shows acute stroke and moyamoya will need close outpt f/u described in A/P and Dr Ferrari's note, BP too high started losartan, pt feels well motivated for DC     I have discussed this patient's plan of care and discharge plan at IDT rounds today with Case Management, Nursing, Nursing leadership, and other members of the IDT team.     Consultants/Specialty  Vascular neurology     Code Status  Full Code     Disposition  The patient is not medically cleared for discharge to home or a post-acute facility.  Anticipate discharge to: skilled nursing facility     I have placed the appropriate orders for post-discharge needs.     Review of Systems  Review of Systems   Constitutional:  Negative for chills and fever.   HENT:  Negative for congestion.    Eyes:  Negative for blurred vision.   Respiratory:  Negative for shortness of breath.    Cardiovascular:  Negative for chest pain and leg swelling.   Gastrointestinal:  Negative for abdominal pain, constipation, diarrhea, nausea and vomiting.   Genitourinary:  Negative for dysuria.   Musculoskeletal:  Negative for myalgias.   Skin:  Negative for rash.   Neurological:  Positive for speech change. Negative for sensory change and focal weakness.      Physical Exam  Temp:  [36.6 °C (97.8 °F)-37 °C (98.6 °F)] 37 °C (98.6 °F)  Pulse:  [54-74] 74  Resp:  [16-19] 18  BP: (115-159)/(62-75) 159/74  SpO2:  [93 %-98 %] 93 %     Physical Exam  Constitutional:       Appearance: Normal appearance.   HENT:      Head: Normocephalic and atraumatic.      Nose: Nose normal.      Mouth/Throat:      Mouth: Mucous membranes are moist.      Pharynx: Oropharynx is clear.   Eyes:      Conjunctiva/sclera: Conjunctivae normal.      Pupils: Pupils are equal,  round, and reactive to light.   Cardiovascular:      Rate and Rhythm: Normal rate and regular rhythm.      Heart sounds: No murmur heard.  Pulmonary:      Effort: Pulmonary effort is normal.      Breath sounds: No wheezing, rhonchi or rales.   Abdominal:      General: There is no distension.      Palpations: Abdomen is soft.      Tenderness: There is no abdominal tenderness. There is no guarding or rebound.   Musculoskeletal:         General: No swelling or tenderness.      Cervical back: Normal range of motion and neck supple.   Skin:     General: Skin is warm and dry.   Neurological:      Mental Status: He is alert and oriented to person, place, and time.      GCS: GCS eye subscore is 4. GCS verbal subscore is 5. GCS motor subscore is 6.      Cranial Nerves: Dysarthria and facial asymmetry present.   Psychiatric:         Mood and Affect: Mood normal.         Behavior: Behavior normal.      Fluids     Intake/Output Summary (Last 24 hours) at 9/26/2023 1721  Last data filed at 9/26/2023 0000  Gross per 24 hour  Intake 50 ml  Output 50 ml  Net 0 ml     Laboratory  Recent Labs    09/23/23  2300 09/25/23  0314  WBC 6.9 6.8  RBC 3.39* 3.44*  HEMOGLOBIN 9.3* 9.6*  HEMATOCRIT 29.4* 28.9*  MCV 86.7 84.0  MCH 27.4 27.9  MCHC 31.6* 33.2  RDW 44.5 43.4  PLATELETCT 254 245  MPV 9.6 9.1     Recent Labs    09/23/23  2300 09/25/23  0314  SODIUM 140 141  POTASSIUM 3.8 3.4*  CHLORIDE 107 108  CO2 22 21  GLUCOSE 261* 169*  BUN 23* 13  CREATININE 1.26 0.94  CALCIUM 8.6 8.3*     Recent Labs    09/24/23  0023  APTT 24.6*  INR 1.09     Recent Labs    09/23/23  2300  TRIGLYCERIDE 79  HDL 27*  LDL 48     Imaging  EC-ECHOCARDIOGRAM COMPLETE W/O CONT  Final Result     MR-BRAIN-W/O  Final Result     1.  Small areas of acute infarcts in the left parietal lobe.  2.  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 The blood vessels better evaluated on the recent the CT angiogram.  3.   Chronic infarct in the right temporal and parietal lobes.  4.  Mild chronic microvascular ischemic disease.  5.  Mild cerebral volume loss.     DX-ESOPHAGUS - [id number]  Final Result     CT-CTA NECK WITH & W/O-POST PROCESSING  Final Result     CT angiogram of the neck within normal limits.     CT-CTA HEAD WITH & W/O-POST PROCESS  Final Result     1.  Occlusion of the M1 segments of the middle cerebral artery is noted bilaterally. These occlusions are likely chronic and collaterals are present as described above.     2.  No other occlusion or filling defect identified.     3.  These findings were discussed with DEVON MEJÍA on 09/24/2023.     CT-CEREBRAL PERFUSION ANALYSIS  Final Result     1.  Cerebral blood flow less than 30% likely representing completed infarct = 28 mL.     2.  T Max more than 6 seconds likely representing combination of completed infarct and ischemia = 44 mL.     3.  Mismatched volume likely representing ischemic brain/penumbra = 16 mL     4.  Please note that the cerebral perfusion was performed on the limited brain tissue around the basal ganglia region. Infarct/ischemia outside the CT perfusion sections can be missed in this study.     CT-HEAD W/O  Final Result     NO ACUTE ABNORMALITIES ARE NOTED ON CT SCAN OF THE HEAD.     Findings are consistent with atrophy.  Decreased attenuation in the periventricular white matter likely indicates microvascular ischemic disease.     DX-CHEST-PORTABLE (1 VIEW)  Final Result     1.  Possible scarring or atelectasis in the left lung base. Aspiration is also possible.     2.  No other infiltrates or consolidations identified.     IR-NEURO INTERVENTIONAL CONSULT-IP    (Results Pending)     Assessment/Plan  * Acute CVA (cerebrovascular accident) (HCC)- (present on admission)  Assessment & Plan  With aphemia. Unable to form words, however full comprehension and preserved written language fluency.  History of 1 stroke and 3 TIAs per patient, stroke 2  years ago left him with aphasia that improved back to normal apparently.  MRI showed Small areas of acute infarcts in the left parietal lobe and signs concerning for Moyamoya  Echo showed normal LVEF and RVEF with mild MR  -Goal normotension.   -PT/OT/SLP  -Eliquis and statin  -Neurology consulted, recommend follow up with Neuro-IR within 2-3 weeks for diagnostic angiogram to identify potential grafts for EDAS following this will need follow up with Dr. Emili Allison [facility] within 4-6 weeks to undergo indirect bypass (EDAS)     Hypertension- (present on admission)  Assessment & Plan  Goal normotension   home amlodipine, added losartan  PRN labetalol, with hydralazine as backup if HR low     AF (atrial fibrillation) (HCC)- (present on admission)  Assessment & Plan  Patient reports history of A-fib.  Not on any medications for this  -Continue Eliquis     Type 2 diabetes mellitus with circulatory disorder, without long-term current use of insulin (HCC)  Assessment & Plan  Hemoglobin A1c 7.5  Insulin basal plus correctional     VTE prophylaxis:    therapeutic anticoagulation with eliquis 5 mg BID    Brain MRI 09-25-23:  IMPRESSION:     1.  Small areas of acute infarcts in the left parietal lobe.  2.  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 The blood vessels better evaluated on the recent the CT angiogram.  3.  Chronic infarct in the right temporal and parietal lobes.  4.  Mild chronic microvascular ischemic disease.  5.  Mild cerebral volume loss.    Co-morbidities:  See PMH  Potential Risk - Complications: Aphasia, Cognitive Impairment, Contractures, Deep Vein Thrombosis, Dysphagia, Incontinence, Malnutrition, Pain, Paralysis, Perceptual Impairment, Pneumonia, Pressure Ulcer, Seizures, and Urinary Tract Infection  Level of Risk: High    Ongoing Medical Management Needed (Medical/Nursing Needs):   Patient Active Problem List     "Diagnosis Date Noted    Acute CVA (cerebrovascular accident) ([facility]) 09/24/2023    Type 2 diabetes mellitus with circulatory disorder, without long-term current use of insulin ([facility]) 09/24/2023    AF (atrial fibrillation) ([facility]) 09/24/2023    Hypertension 09/24/2023     Alert with cognitive impairment.    Current Vital Signs:   Temperature: 36.6 °C (97.9 °F) Pulse: 63 Respiration: 16 Blood Pressure: 118/63  Weight: 64.2 kg (141 lb 8.6 oz) Height: 180.3 cm (5' 11\")  Pulse Oximetry: 97 % O2 (LPM): 0      Completed Laboratory Reports:  Recent Labs     09/25/23  0314 09/27/23  0413   WBC 6.8 10.5   HEMOGLOBIN 9.6* 10.1*   HEMATOCRIT 28.9* 31.5*   PLATELETCT 245 337   SODIUM 141 142   POTASSIUM 3.4* 3.7   BUN 13 15   CREATININE 0.94 1.15   ALBUMIN 3.0*  --    GLUCOSE 169* 200*     Additional Labs: Not Applicable    Prior Living Situation:   Housing / Facility: 1 Story House  Steps Into Home: 0  Steps In Home: 0  Lives with - Patient's Self Care Capacity: Sibling  Equipment Owned: Tub / Shower Seat (Thinks his brother's toilet is raised.)    Prior Level of Function / Living Situation:   Physical Therapy: Prior Services: Home-Independent  Housing / Facility: 1 Story House  Steps Into Home: 0  Steps In Home: 0  Bathroom Set up: Walk In Shower, Shower Chair (somewhat uncertain of setup)  Equipment Owned: Tub / Shower Seat (Thinks his brother's toilet is raised.)  Lives with - Patient's Self Care Capacity: Sibling  Bed Mobility: Independent  Transfer Status: Independent  Ambulation: Independent  Assistive Devices Used: None  Stairs: Independent  Current Level of Function:   Gait Level Of Assist: Standby Assist  Assistive Device: Front Wheel Walker  Distance (Feet): 600  Deviation: Decreased Base Of Support, Decreased Heel Strike  Supine to Sit: Supervised  Sit to Supine: Supervised  Scooting: Supervised  Rolling: Supervised  Skilled Intervention: Verbal Cuing  Comments: HOB flat, no use of rails  Sit to Stand: Standby " Assist  Bed, Chair, Wheelchair Transfer: Standby Assist  Toilet Transfers: Standby Assist  Skilled Intervention: Verbal Cuing  Sitting in Chair: NT  Sitting Edge of Bed: >5 min  Standing: >5 min  Occupational Therapy:   Self Feeding: Independent  Grooming / Hygiene: Independent  Bathing: Independent  Dressing: Requires Assist (assist if wearing non slip on shoes or socks, otherwise I)  Toileting: Independent  Medication Management: Independent  Finances: Independent  Home Management: Independent  Shopping: Requires Assist  Prior Level Of Mobility: Independent Without Device in Community, Independent Without Device in Home  Prior Services: Home-Independent  Housing / Facility: [address] [place] [address]  Current Level of Function:   Lower Body Dressing: Standby Assist (don/doff socks)  Toileting:  (toilet transfer only)  Skilled Intervention: Verbal Cuing  Speech Language Pathology:      Rehabilitation Prognosis/Potential: Good  Estimated Length of Stay: 10-12 days    Nursing:      Incontinent    Scope/Intensity of Services Recommended:  Physical Therapy: 1 hr / day  5 days / week. Therapeutic Interventions Required: Maximize Endurance, Mobility, Strength, and Safety  Occupational Therapy: 1 hr / day 5 days / week. Therapeutic Interventions Required: Maximize Self Care, ADLs, IADLs, and Energy Conservation  Speech & Language Pathology: 1 hr / day 5 days / week. Therapeutic Interventions Required: Maximize Cognition, Swallowing, and Safety  Rehabilitation Nursin/7. Therapeutic Interventions Required: Monitor Pain, Skin, Vital Signs, Intake and Output, Labs, Safety, Aspiration Risk, and Family Training  Rehabilitation Physician: 3 - 5 days / week. Therapeutic Interventions Required: Medical Management    He requires 24-hour rehabilitation nursing to manage bowel and bladder function, skin care, nutrition and fluid intake, pain control, safety, medication management, and patient/family goals. In addition, rehabilitation nursing  will reiterate and reinforce therapy skills and equipment use, including ADLs, as well as provide education to the patient and family. Zane Galenao is willing to participate in and is able to tolerate the proposed plan of care.    Rehabilitation Goals and Plan (Expected frequency & duration of treatment in the IRF):   Return to the Community, Minimal Assist Level of Care, and Family Able to Provide 24/7 Assistance  Anticipated Date of Rehabilitation Admission: 09-28-23  Patient/Family oriented IRF level of care/facility/plan: Yes  Patient/Family willing to participate in IRF care/facility/plan: Yes  Patient able to tolerate IRF level of care proposed: Yes  Patient has potential to benefit IRF level of care proposed: Yes  Comments: Not Applicable    Special Needs or Precautions - Medical Necessity:  Safety Concerns/Precautions:  Fall Risk / High Risk for Falls, Balance, Cognition, and Bed / Chair Alarm  Pain Management  Precautions: Fall Risk, Swallow Precautions  Comments: aphasia, dysarthria  IV Site: Peripheral  Current Medications:    Current Facility-Administered Medications Ordered in Epic   Medication Dose Route Frequency Provider Last Rate Last Admin    [START ON 9/28/2023] insulin GLARGINE (Lantus,Semglee) injection  20 Units Subcutaneous QAM INSULIN Huber Dangelo D.O.        And    insulin lispro (HumaLOG,AdmeLOG) injection  1-6 Units Subcutaneous 4X/DAY ACHS Huber Dangelo D.O.        And    dextrose 50% (D50W) injection 25 g  25 g Intravenous Q15 MIN PRN Huber Dangelo D.O.        losartan (Cozaar) tablet 25 mg  25 mg Oral Q DAY ANA KirkO.   25 mg at 09/27/23 0557    apixaban (Eliquis) tablet 5 mg  5 mg Oral BID Donato Brice M.D.   5 mg at 09/27/23 0557    scopolamine (Transderm-Scop) patch 1 Patch  1 Patch Transdermal Q72HRS José Barrett M.D.   1 Patch at 09/27/23 0212    hydrALAZINE (Apresoline) injection 10 mg  10 mg Intravenous Q4HRS PRN Jake Dietz,  A.P.R.N.   10 mg at 09/24/23 1505    atropine 1 % ophthalmic solution 2 Drop  2 Drop Sublingual Q4HRS PRN Jkae BRIAN Mckinneykanin, A.P.R.N.   2 Drop at 09/26/23 1531    atorvastatin (Lipitor) tablet 40 mg  40 mg Oral Q EVENING Jake A Hankkanin, A.P.R.N.   40 mg at 09/26/23 1757    Pharmacy Consult Request ...Pain Management Review 1 Each  1 Each Other PHARMACY TO DOSE Jake BRIAN Moultonn, A.P.R.N.        acetaminophen (Tylenol) tablet 650 mg  650 mg Oral Q6HRS PRN Jake A Hankkanin, A.P.R.N.   650 mg at 09/26/23 1757    oxyCODONE immediate-release (Roxicodone) tablet 2.5 mg  2.5 mg Oral Q3HRS PRN Jake A Enzozkanin, A.P.R.N.        Or    oxyCODONE immediate-release (Roxicodone) tablet 5 mg  5 mg Oral Q3HRS PRN Jake A Haknkanin, A.P.R.N.   5 mg at 09/27/23 0750    Or    HYDROmorphone (Dilaudid) injection 0.25 mg  0.25 mg Intravenous Q3HRS PRN Jake A Matzkanin, A.P.R.N.        labetalol (Normodyne/Trandate) injection 10 mg  10 mg Intravenous Q4HRS PRN Jake A Hankkanin, A.P.R.N.   10 mg at 09/24/23 1753    amLODIPine (Norvasc) tablet 10 mg  10 mg Oral Q DAY Jake A Hankkanin, A.P.R.N.   10 mg at 09/27/23 0557     No current Epic-ordered outpatient medications on file.     Diet:   DIET ORDERS (From admission to next 24h)       Start     Ordered    09/24/23 1239  Diet Order Diet: Level 4 - Pureed (meds crushed. 1:1 spv for strategies - needs head down/neutral with small sips); Liquid level: Level 0 - Thin; Tray Modifications (optional): SLP - 1:1 Supervision by Nursing, SLP - Deliver to Nursing Station, No...  ALL MEALS        Question Answer Comment   Diet: Level 4 - Pureed meds crushed. 1:1 spv for strategies - needs head down/neutral with small sips   Liquid level Level 0 - Thin    Tray Modifications (optional) SLP - 1:1 Supervision by Nursing    Tray Modifications (optional) SLP - Deliver to Nursing Station    Tray Modifications (optional) No Straws        09/24/23 1238                    Anticipated Discharge Destination  / Patient/Family Goal:  Destination: Home with Assistance Support System: Family   Anticipated home health services: OT, PT, and SLP  Previously used HH service/ provider: Not Applicable  Anticipated DME Needs: [employer]  Outpatient Services: OT and PT  Alternative resources to address additional identified needs:   No future appointments.   Pre-Screen Completed: 9/27/2023 11:30 AM Jhonny Tellez L.P.N.

## 2023-09-27 NOTE — PROGRESS NOTES
Monitor summary: SR 60-82, IN .12, QRS .08, QT .38 with (F)PVC/bigeminy/trigeminy, (R) couplet , A-idio runs per strip from monitor room.

## 2023-09-27 NOTE — THERAPY
"Occupational Therapy  Discharge Note     Patient Name: Zane Galeano  Age:  63 y.o., Sex:  male  Medical Record #: [id number]  Today's Date: 9/27/2023     Precautions  Precautions: Fall Risk, Swallow Precautions  Comments: aphasia, dysarthria    Assessment    Pt seen for OT treatment. Pt donned/doffed socks, stood to wash hands, and performed toilet transfer w/ supv-SBA. Pt reported feeling much less fatigued than initial evaluation. Pt also mentioned that his brother has a wife who cares for his brother and can also assist him. Pt was still unsure of bathroom setup. Pt educated regarding DME recommendations (handout provided), recommendation for home health, and the role of OT. Pt has met all current OT goals. Patient will not be actively followed for occupational therapy services at this time, however may be seen if requested by physician for 1 more visit within 30 days to address any discharge or equipment needs.      Plan    Treatment Plan Status: Modify Current Treatment Plan  Type of Treatment: Self Care / Activities of Daily Living, Adaptive Equipment, Cognitive Skill Development, Neuro Re-Education / Balance, Therapeutic Exercises, Therapeutic Activity  Treatment Frequency: 3 Times per Week  Treatment Duration: Discharge Needs Only    DC Equipment Recommendations: Grab Bar(s) by Toilet  Discharge Recommendations: Recommend home health for continued occupational therapy services    Subjective    \"I feel much better today.\"     Objective     09/27/23 0859   Cognition    Cognition / Consciousness X   Speech/ Communication Dysarthric;Expressive Aphasia  (Improved compared to prior session)   Level of Consciousness Alert   Ability To Follow Commands 2 Step   Comments Pleasant and cooperative, reported feeling better today   Other Treatments   Other Treatments Provided Discussed social situation, bathroom set up and DME. Pt stated that he has a sister in law who assists in caring for his brother who may also be able " "to assist him. Pt still unsure of bathroom set up. Pt reported that he may talk to his brother and his wife about DME recommendations.   Balance   Sitting Balance (Static) Fair +   Sitting Balance (Dynamic) Fair +   Standing Balance (Static) Fair   Standing Balance (Dynamic) Fair   Weight Shift Sitting Fair   Weight Shift Standing Fair   Skilled Intervention Verbal Cuing   Comments w/ no AD   Bed Mobility    Supine to Sit Supervised   Sit to Supine Supervised   Scooting Supervised   Rolling Supervised   Skilled Intervention Verbal Cuing   Comments HOB flat, no use of rails   Activities of Daily Living   Grooming Standby Assist;Standing  (washed hands at sink)   Lower Body Dressing Standby Assist  (don/doff socks)   Toileting   (toilet transfer only)   Skilled Intervention Verbal Cuing   Functional Mobility   Sit to Stand Standby Assist   Bed, Chair, Wheelchair Transfer Standby Assist   Toilet Transfers Standby Assist   Mobility EOB>bathroom>bed   Skilled Intervention Verbal Cuing   Comments w/ no AD   Visual Perception   Visual Perception  Not Tested   Activity Tolerance   Sitting in Chair NT   Sitting Edge of Bed >5 min   Standing >5 min   Patient / Family Goals   Patient / Family Goal #1 \"I want to be able to get on and off the toilet on my own.\"   Goal #1 Outcome Progressing as expected   Short Term Goals   Short Term Goal # 1 Pt will perform ADL transfer w/ supv   Goal Outcome # 1 Goal met   Short Term Goal # 2 Pt will perform LB dressing w/ supv and AE PRN   Goal Outcome # 2 Goal met   Short Term Goal # 3 Pt will perform standing g/h w/ supv   Goal Outcome # 3 Goal met   Education Group   Education Provided Role of Occupational Therapist;Activities of Daily Living;Adaptive Equipment   Role of Occupational Therapist Patient Response Patient;Acceptance;Explanation;Verbal Demonstration   ADL Patient Response Patient;Acceptance;Explanation;Demonstration;Verbal Demonstration;Action Demonstration   Adaptive Equipment " Patient Response Patient;Acceptance;Explanation;Demonstration;Verbal Demonstration;Action Demonstration

## 2023-09-27 NOTE — CONSULTS
Physical Medicine and Rehabilitation Consultation              Date of initial consultation: 9/27/2023  Requesting provider: ordered by José Barrett M.D. at 09/27/23 5331   Consulting provider: Linda Barnhart D.O.  Reason for consultation: assess for acute inpatient rehab appropriateness  LOS: 3 Day(s)    Chief complaint: partial airway obstruction     HPI: The patient is a 63 y.o.  male with a past medical history of multiple TIAs, afib, CKD, and DM ;  who presented on 9/23/2023 10:34 PM with sudden aphasia and difficult swallowing. Per documentation, patient awoke from a nap and had dfficulty speaking, patient did not seek medical attention right away. That evening patient had a hamburger and started choking on his meal which prompted a trip the ED. Given recent aphasia, stroke work up initiated. CT perfusion scan showed penumbra of 16ML and CTA head shwoed an M1 occlusion bilaterally with collateral flow. 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 Neurology consulted, MRI brain obtained showed L MCA ischemia. Plan to continue home dose apixaban and goal SBP is 110-130. Per neuro, planning for outpatient Neuro IR for diagnostic angiogram in approx 2-3 weeks to identify potential area of grafting for EDAS. Patient's hospital course has been notable for hyperglycemia and ABLA.     Patient seen and examined at bedside, is frustrated and just wants to get up to pee. Patient reports he had baseline dysarthria after his stroke 2 years ago, but notices it is much worse now. Does not have word finding difficulty. Does not report HA, lightheadedness, SOB, CP, abdominal pain, or changes in numbness/tingling/weakness. Does not want to discuss options for rehab until he talks to his brother, wants visit to end so he can get up to the toile to urinate. Notified nursing, nursing present to assist patient up to bathroom.     Social Hx:  Patient lives with brother in a 1 story house with  no BRAIN   0 BRAIN  At prior level of function patient was Independent with mobility and ADLs.     Tobacco: Denies   Alcohol: Denies   Drugs: Denies     THERAPY:  Restrictions: Fall Risk, Swallow Precautions   PT: Functional mobility   9/26 SBA with FWW x 600 ft     OT: ADLs  9/26 SBA sit to satnd, Max A lower body dressing     SLP:   9/25 puree solids and thin liquids     IMAGING:  CT-CTA NECK WITH & W/O-POST PROCESSING   Final Result       CT angiogram of the neck within normal limits.       CT-CTA HEAD WITH & W/O-POST PROCESS   Final Result       1.  Occlusion of the M1 segments of the middle cerebral artery is noted bilaterally. These occlusions are likely chronic and collaterals are present as described above.       2.  No other occlusion or filling defect identified.       3.  These findings were discussed with DEVON MEJÍA on 09/24/2023.       CT-CEREBRAL PERFUSION ANALYSIS   Final Result       1.  Cerebral blood flow less than 30% likely representing completed infarct = 28 mL.       2.  T Max more than 6 seconds likely representing combination of completed infarct and ischemia = 44 mL.       3.  Mismatched volume likely representing ischemic brain/penumbra = 16 mL       4.  Please note that the cerebral perfusion was performed on the limited brain tissue around the basal ganglia region. Infarct/ischemia outside the CT perfusion sections can be missed in this study.       CT-HEAD W/O   Final Result           NO ACUTE ABNORMALITIES ARE NOTED ON CT SCAN OF THE HEAD.       PROCEDURES:  None     PMH:  No past medical history on file.    PSH:  No past surgical history on file.    FHX:  No family history on file.    Medications:  Current Facility-Administered Medications   Medication Dose    [START ON 9/28/2023] insulin GLARGINE (Lantus,Semglee) injection  20 Units    And    insulin lispro (HumaLOG,AdmeLOG) injection  1-6 Units    And    dextrose 50% (D50W) injection 25 g  25 g    insulin GLARGINE  "(Lantus,Semglee) injection  10 Units    losartan (Cozaar) tablet 25 mg  25 mg    apixaban (Eliquis) tablet 5 mg  5 mg    scopolamine (Transderm-Scop) patch 1 Patch  1 Patch    hydrALAZINE (Apresoline) injection 10 mg  10 mg    atropine 1 % ophthalmic solution 2 Drop  2 Drop    atorvastatin (Lipitor) tablet 40 mg  40 mg    Pharmacy Consult Request ...Pain Management Review 1 Each  1 Each    acetaminophen (Tylenol) tablet 650 mg  650 mg    oxyCODONE immediate-release (Roxicodone) tablet 2.5 mg  2.5 mg    Or    oxyCODONE immediate-release (Roxicodone) tablet 5 mg  5 mg    Or    HYDROmorphone (Dilaudid) injection 0.25 mg  0.25 mg    labetalol (Normodyne/Trandate) injection 10 mg  10 mg    amLODIPine (Norvasc) tablet 10 mg  10 mg       Allergies:  Allergies   Allergen Reactions    Vancomycin Unspecified     Suspected induced nephropathy        Physical Exam:  Vitals: /65   Pulse 61   Temp 36.8 °C (98.2 °F) (Temporal)   Resp 16   Ht 1.803 m (5' 11\")   Wt 64.2 kg (141 lb 8.6 oz)   SpO2 96%   Gen: NAD, seated comfortably on EOB   Head:  NC/AT  Eyes/ Nose/ Mouth: PERRLA, moist mucous membranes  Cardio: RRR, good distal perfusion, warm extremities  Pulm: normal respiratory effort, no cyanosis, on RA   Abd: Soft NTND, negative borborygmi     Mental status: answers questions appropriately follows commands  Speech: + dysarthria, no word finding difficulty     CRANIAL NERVES:  2,3: visual acuity grossly intact, PERRL  3,4,6: EOMI bilaterally, no nystagmus or diplopia  5: sensation intact to light touch bilaterally and symmetric  7: no facial asymmetry  8: hearing grossly intact      Motor:minimal exam, hyperfocused on getting out of bed   5/5  strength, EE, EF, 5/5 HF b/l , 5/5 PF       Sensory:   intact to light touch through out    No clonus at bilateral ankles      Tone: no spasticity noted, no cogwheeling noted    Coordination:   intact fine motor with fingers bilaterally      Labs: Reviewed and significant " for   Recent Labs     09/25/23 0314 09/27/23 0413   RBC 3.44* 3.72*   HEMOGLOBIN 9.6* 10.1*   HEMATOCRIT 28.9* 31.5*   PLATELETCT 245 337     Recent Labs     09/25/23 0314 09/27/23 0413   SODIUM 141 142   POTASSIUM 3.4* 3.7   CHLORIDE 108 109   CO2 21 23   GLUCOSE 169* 200*   BUN 13 15   CREATININE 0.94 1.15   CALCIUM 8.3* 8.6     Recent Results (from the past 24 hour(s))   POCT glucose device results    Collection Time: 09/26/23 12:31 PM   Result Value Ref Range    POC Glucose, Blood 173 (H) 65 - 99 mg/dL   POCT glucose device results    Collection Time: 09/26/23  5:47 PM   Result Value Ref Range    POC Glucose, Blood 225 (H) 65 - 99 mg/dL   POCT glucose device results    Collection Time: 09/26/23  9:54 PM   Result Value Ref Range    POC Glucose, Blood 193 (H) 65 - 99 mg/dL   CBC WITHOUT DIFFERENTIAL    Collection Time: 09/27/23  4:13 AM   Result Value Ref Range    WBC 10.5 4.8 - 10.8 K/uL    RBC 3.72 (L) 4.70 - 6.10 M/uL    Hemoglobin 10.1 (L) 14.0 - 18.0 g/dL    Hematocrit 31.5 (L) 42.0 - 52.0 %    MCV 84.7 81.4 - 97.8 fL    MCH 27.2 27.0 - 33.0 pg    MCHC 32.1 (L) 32.3 - 36.5 g/dL    RDW 43.8 35.9 - 50.0 fL    Platelet Count 337 164 - 446 K/uL    MPV 9.3 9.0 - 12.9 fL   Basic Metabolic Panel    Collection Time: 09/27/23  4:13 AM   Result Value Ref Range    Sodium 142 135 - 145 mmol/L    Potassium 3.7 3.6 - 5.5 mmol/L    Chloride 109 96 - 112 mmol/L    Co2 23 20 - 33 mmol/L    Glucose 200 (H) 65 - 99 mg/dL    Bun 15 8 - 22 mg/dL    Creatinine 1.15 0.50 - 1.40 mg/dL    Calcium 8.6 8.5 - 10.5 mg/dL    Anion Gap 10.0 7.0 - 16.0   ESTIMATED GFR    Collection Time: 09/27/23  4:13 AM   Result Value Ref Range    GFR (CKD-EPI) 71 >60 mL/min/1.73 m 2   EKG    Collection Time: 09/27/23  4:15 AM   Result Value Ref Range    Report       [facility]    Test Date:  2023-09-27  Pt Name:    ELOISA FLAHERTY                 Department: [facility]  MRN:        [id number]                      Room:       [facility]  Gender:     Male                          Technician: ALIYAH  :        1960                   Requested By:MOODY RAPP  Order #:    [id number]                    Reading MD:    Measurements  Intervals                                Axis  Rate:       66                           P:          0  WI:         211                          QRS:        62  QRSD:       131                          T:          62  QT:         423  QTc:        444    Interpretive Statements  Sinus rhythm  Multiple ventricular premature complexes  Consider left atrial enlargement  Nonspecific intraventricular conduction delay  Inferior infarct, acute (LCx)  Artifact in lead(s) I,II,III,aVR,aVL,aVF,V1,V3,V4,V5,V6  Compared to ECG 2023 01:59:54  Intraventricular conduction delay now present  Myocardial infarct finding now present  ST (T wave) d eviation no longer present           ASSESSMENT:  Patient is a 63 y.o. male admitted with dysarthria and dysphagia     [facility] Code / Diagnosis to Support: 0001.4 - Stroke: No Paresis    Rehabilitation: Impaired ADLs and mobility  Patient is a good candidate for inpatient rehab based on needs for OT and SLP     Barriers to transfer include: Insurance authorization, TCCs to verify disposition, medical clearance and bed availability     Additional Recommendations:  L MCA stroke   Prior TIAs  - known history of prior TIAs and prior stroke 2 years ago with deficits of apshasia   - new worsening dysarthria, no lateralizing strength deficits   - MRI brain showed area of L MCA infract likely due to failure of collaterals   - CTA head showing areas of arteriopathy bilaterally consistent with moyamoya   - neuro recommending continuing with current eliquis for AC  - planning for outpatient diagnostic Neuro IR to evaluate areas of potential indirect bypass with neurosurgery , Neuro IR planned for 2-3 weeks   - continue with OT/SLP, has minimal PT needs     Afib   - known history of afib   - continue home dose apixabn     DM   -  hyperglycemia up to 200  - continue lantus 20 units QAM     HTN   - continue home dose amlodipine, newly on losartan for BP control   - goal  -130     Dispo:  - patient is currently functioning below their level of baseline, recommend post acute rehab  - recommend IRF level therapy with 3hr of therapy 5 days per week  - piror to acceptance to IRF, will need insurance auth from [employer] if patient agreeable to IRF   - TCC to assist with insurance auth and DC support         Medical Complexity:  L MCA syndrome   Prior TIAs  Afib   DM   HTN   Impaired mobility and ADLs         DVT PPX: eliquis       Thank you for allowing us to participate in the care of this patient.     Patient was seen for 81 minutes on unit/floor of which > 50% of time was spent on counseling and coordination of care regarding the above, including prognosis, risk reduction, benefits of treatment, and options for next stage of care.    Linda Barnhart D.O.   Physical Medicine and Rehabilitation     Please note that this dictation was created using voice recognition software. I have made every reasonable attempt to correct obvious errors, but there may be errors of grammar and possibly content that I did not discover before finalizing the note.

## 2023-09-27 NOTE — DISCHARGE PLANNING
Physiatry to consult. Cerebral angiogram with neuro IR to be completed in approximately 2 weeks.    1141-Submitted to insurance. In the event Zane is agreeable with an admission.

## 2023-09-28 LAB
ANION GAP SERPL CALC-SCNC: 12 MMOL/L (ref 7–16)
BUN SERPL-MCNC: 12 MG/DL (ref 8–22)
CALCIUM SERPL-MCNC: 8.4 MG/DL (ref 8.5–10.5)
CHLORIDE SERPL-SCNC: 108 MMOL/L (ref 96–112)
CO2 SERPL-SCNC: 24 MMOL/L (ref 20–33)
CREAT SERPL-MCNC: 1.03 MG/DL (ref 0.5–1.4)
GFR SERPLBLD CREATININE-BSD FMLA CKD-EPI: 81 ML/MIN/1.73 M 2
GLUCOSE BLD STRIP.AUTO-MCNC: 136 MG/DL (ref 65–99)
GLUCOSE BLD STRIP.AUTO-MCNC: 137 MG/DL (ref 65–99)
GLUCOSE BLD STRIP.AUTO-MCNC: 184 MG/DL (ref 65–99)
GLUCOSE BLD STRIP.AUTO-MCNC: 185 MG/DL (ref 65–99)
GLUCOSE BLD STRIP.AUTO-MCNC: 224 MG/DL (ref 65–99)
GLUCOSE SERPL-MCNC: 124 MG/DL (ref 65–99)
MAGNESIUM SERPL-MCNC: 1.8 MG/DL (ref 1.5–2.5)
POTASSIUM SERPL-SCNC: 3.7 MMOL/L (ref 3.6–5.5)
SODIUM SERPL-SCNC: 144 MMOL/L (ref 135–145)

## 2023-09-28 PROCEDURE — A9270 NON-COVERED ITEM OR SERVICE: HCPCS | Performed by: STUDENT IN AN ORGANIZED HEALTH CARE EDUCATION/TRAINING PROGRAM

## 2023-09-28 PROCEDURE — 82962 GLUCOSE BLOOD TEST: CPT

## 2023-09-28 PROCEDURE — 92526 ORAL FUNCTION THERAPY: CPT

## 2023-09-28 PROCEDURE — 80048 BASIC METABOLIC PNL TOTAL CA: CPT

## 2023-09-28 PROCEDURE — 700102 HCHG RX REV CODE 250 W/ 637 OVERRIDE(OP): Performed by: HOSPITALIST

## 2023-09-28 PROCEDURE — 700111 HCHG RX REV CODE 636 W/ 250 OVERRIDE (IP): Performed by: STUDENT IN AN ORGANIZED HEALTH CARE EDUCATION/TRAINING PROGRAM

## 2023-09-28 PROCEDURE — 700102 HCHG RX REV CODE 250 W/ 637 OVERRIDE(OP)

## 2023-09-28 PROCEDURE — A9270 NON-COVERED ITEM OR SERVICE: HCPCS

## 2023-09-28 PROCEDURE — A9270 NON-COVERED ITEM OR SERVICE: HCPCS | Performed by: HOSPITALIST

## 2023-09-28 PROCEDURE — 83735 ASSAY OF MAGNESIUM: CPT

## 2023-09-28 PROCEDURE — 36415 COLL VENOUS BLD VENIPUNCTURE: CPT

## 2023-09-28 PROCEDURE — 770001 HCHG ROOM/CARE - MED/SURG/GYN PRIV*

## 2023-09-28 PROCEDURE — 700102 HCHG RX REV CODE 250 W/ 637 OVERRIDE(OP): Performed by: STUDENT IN AN ORGANIZED HEALTH CARE EDUCATION/TRAINING PROGRAM

## 2023-09-28 PROCEDURE — 99231 SBSQ HOSP IP/OBS SF/LOW 25: CPT | Performed by: STUDENT IN AN ORGANIZED HEALTH CARE EDUCATION/TRAINING PROGRAM

## 2023-09-28 RX ORDER — MAGNESIUM SULFATE 1 G/100ML
1 INJECTION INTRAVENOUS ONCE
Status: COMPLETED | OUTPATIENT
Start: 2023-09-28 | End: 2023-09-28

## 2023-09-28 RX ORDER — POTASSIUM CHLORIDE 20 MEQ/1
40 TABLET, EXTENDED RELEASE ORAL ONCE
Status: COMPLETED | OUTPATIENT
Start: 2023-09-28 | End: 2023-09-28

## 2023-09-28 RX ADMIN — AMLODIPINE BESYLATE 10 MG: 5 TABLET ORAL at 05:54

## 2023-09-28 RX ADMIN — OXYCODONE HYDROCHLORIDE 5 MG: 5 TABLET ORAL at 07:44

## 2023-09-28 RX ADMIN — LOSARTAN POTASSIUM 25 MG: 50 TABLET, FILM COATED ORAL at 05:54

## 2023-09-28 RX ADMIN — ATORVASTATIN CALCIUM 40 MG: 40 TABLET, FILM COATED ORAL at 18:23

## 2023-09-28 RX ADMIN — APIXABAN 5 MG: 5 TABLET, FILM COATED ORAL at 18:23

## 2023-09-28 RX ADMIN — APIXABAN 5 MG: 5 TABLET, FILM COATED ORAL at 05:54

## 2023-09-28 RX ADMIN — POTASSIUM CHLORIDE 40 MEQ: 1500 TABLET, EXTENDED RELEASE ORAL at 09:21

## 2023-09-28 RX ADMIN — OXYCODONE HYDROCHLORIDE 5 MG: 5 TABLET ORAL at 20:08

## 2023-09-28 RX ADMIN — INSULIN LISPRO 1 UNITS: 100 INJECTION, SOLUTION INTRAVENOUS; SUBCUTANEOUS at 12:08

## 2023-09-28 RX ADMIN — ATROPINE SULFATE 2 DROP: 10 SOLUTION/ DROPS OPHTHALMIC at 21:13

## 2023-09-28 RX ADMIN — INSULIN LISPRO 2 UNITS: 100 INJECTION, SOLUTION INTRAVENOUS; SUBCUTANEOUS at 21:17

## 2023-09-28 RX ADMIN — INSULIN LISPRO 1 UNITS: 100 INJECTION, SOLUTION INTRAVENOUS; SUBCUTANEOUS at 18:24

## 2023-09-28 RX ADMIN — MAGNESIUM SULFATE IN DEXTROSE 1 G: 10 INJECTION, SOLUTION INTRAVENOUS at 09:24

## 2023-09-28 ASSESSMENT — ENCOUNTER SYMPTOMS
BLURRED VISION: 0
SHORTNESS OF BREATH: 0
VOMITING: 0
NAUSEA: 0
FOCAL WEAKNESS: 0
SENSORY CHANGE: 0
CHILLS: 0
FEVER: 0
ABDOMINAL PAIN: 0
CONSTIPATION: 0
MYALGIAS: 0
DIARRHEA: 0
SPEECH CHANGE: 1

## 2023-09-28 ASSESSMENT — PAIN DESCRIPTION - PAIN TYPE
TYPE: ACUTE PAIN

## 2023-09-28 NOTE — CARE PLAN
Problem: Psychosocial - Patient Condition  Goal: Patient's ability to verbalize feelings about condition will improve  Description: Target End Date:  Prior to discharge or change in level of care    1.  Discuss coping with medical condition and its effects  2.  Encourage patient participation in care  3.  Encourage acknowledgement of body changes and accompanying emotions  4.  Perform depression screening  Outcome: Progressing   The patient is Watcher - Medium risk of patient condition declining or worsening    Shift Goals  Clinical Goals: neuro checks pain management  Patient Goals: pain control  Family Goals: SHARATH    Progress made toward(s) clinical / shift goals:      Patient is not progressing towards the following goals:

## 2023-09-28 NOTE — PROGRESS NOTES
Monitor summary: A Idio 50-57, VA -0.14, QRS -0.10, QT -0.39, with frequent bigeminal PVCs per strip from the monitor room.

## 2023-09-28 NOTE — PROGRESS NOTES
Monitor summary: SB/SR 56-70, KS .13, QRS .10, QT .42 with (F)PVC (O)couplets/bigeminy/trigeminy, (R) couplets/trigem, per strip from monitor room.

## 2023-09-28 NOTE — DISCHARGE PLANNING
Zane is no longer requiring 2 of 3 disciplines.  TCC will no longer follow.  Please reach out to myself with any interval changes/questions.
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
## 2023-09-28 NOTE — THERAPY
"Speech Language Pathology   Daily Treatment     Patient Name: Zane Galeano  AGE:  63 y.o., SEX:  male  Medical Record #: [id number]  Date of Service: 9/28/2023      Precautions:  Precautions: Fall Risk, Swallow Precautions    Subjective  RN cleared patient for dysphagia tx session. Patient awake, alert, watching a movie on his computer, and very motivated, pleasant, and cooperative. Patient still with min-moderate dysarthria and expressive aphasia noted, but able to make wants/needs known with slow verbal output.     Assessment  Patient reported intermittent coughing and left-sided bolus loss w/ cup sips of thins at times. He reports MD started him on drops under the tongue to reduce saliva production, which he reports has helped tremendously with management of saliva and overall swallow function; \"I'm not drowning anymore.\" Patient consumed PO trials of pudding, soft solids, and thin liquids via cup sip and straw today. Patient was able to self-deliver PO trials and independently utilized most swallow precautions previously recommended, such as small bites/sips, slow feeding rate, 2-3 swallows per bolus, and neutral chin lowered head position. Patient consumed PO trials of thins via cup with cough x2 out of 5 sips. Anterior bolus loss of left side noted x1. Straw sips were trialed w/ no overt coughing and no bolus loss and patient reported \"I feel like I can control it better this way.\" Patient presented with appropriate bolus acceptance and containment on all other textures. Mastication mildly prolonged on soft solids, but complete oral clearance was achieved. No s/sx of aspiration on any textures trialed.     Clinical Impressions  Patient presents with improvement in oropharyngeal swallow function and management of saliva, per his report. Patient appears appropriate to upgrade to SB6/TN0 diet (soft/bite-sized w/ thins) w/ intermittent supervision and assistance as needed. SLP is following for continued dysphagia " "management and aphasia tx as able/appropriate.     Recommendations  Treatment Completed: Dysphagia Treatment    Dysphagia Treatment  Diet Consistency: SB6/TN0 (soft/bite-sized w/ thins)  Instrumentation: None indicated at this time  Medication: Whole with puree  Supervision: Encourage self-feeding, Distant supervision - check on patient 2-3 times per meal  Positioning: Fully upright and midline during oral intake, Meals sitting upright in a chair, as tolerated  Risk Management : Small bites/sips, Slow rate of intake, Physical mobility, as tolerated, Reduce environmental distractions  Oral Care: Q4h    SLP Treatment Plan  Treatment Plan: Dysphagia Treatment, Patient/Family/Caregiver Training  SLP Frequency: 4x Per Week  Estimated Duration: Until Therapy Goals Met    Anticipated Discharge Needs  Discharge Recommendations: Recommend post-acute placement for additional speech therapy services prior to discharge home  Therapy Recommendations Upon DC: Dysphagia Training, Comprehension Training, Expression Training, Writing Training, Community Re-Integration, Patient / Family / Caregiver Education    Patient / Family Goals  Patient / Family Goal #1: \"I can't control liquids.\"  Goal #1 Outcome: Progressing as expected  Short Term Goals  Short Term Goal # 1 B : NEW 9/28: Patient will consume SB6/TN0 diet with intermittent supervision with no overt s/sx of aspiration or decline in respiratory status.  Short Term Goal # 2: Pt will complete exercises targeting BOT retraction, epiglottic inversion, LVC, hyolaryngeal movement, and pharyngeal constriction x30 in a session given min cues from SLP with \"good\" accuracy.  Goal Outcome # 2 : Progressing as expected    Gayle Diallo, SLP  "

## 2023-09-28 NOTE — CARE PLAN
Problem: Pain - Standard  Goal: Alleviation of pain or a reduction in pain to the patient’s comfort goal  Outcome: Progressing     Problem: Optimal Care of the Stroke Patient  Goal: Optimal emergency care for the stroke patient  Outcome: Progressing  Goal: Optimal acute care for the stroke patient  Outcome: Progressing     Problem: Knowledge Deficit - Stroke Education  Goal: Patient's knowledge of stroke and risk factors will improve  Outcome: Progressing     Problem: Psychosocial - Patient Condition  Goal: Patient's ability to verbalize feelings about condition will improve  Outcome: Progressing  Goal: Patient's ability to re-evaluate and adapt role responsibilities will improve  Outcome: Progressing     Problem: Discharge Planning - Stroke  Goal: Ensure Stroke Core Measures are met prior to discharge  Outcome: Progressing  Goal: Patient’s continuum of care needs will be met  Outcome: Progressing     Problem: Neuro Status  Goal: Neuro status will remain stable or improve  Outcome: Progressing     Problem: Hemodynamic Monitoring  Goal: Patient's hemodynamics, fluid balance and neurologic status will be stable or improve  Outcome: Progressing     Problem: Respiratory - Stroke Patient  Goal: Patient will achieve/maintain optimum respiratory rate/effort  Outcome: Progressing     Problem: Dysphagia  Goal: Dysphagia will improve  Outcome: Progressing     Problem: Risk for Aspiration  Goal: Patient's risk for aspiration will be absent or decrease  Outcome: Progressing     Problem: Urinary Elimination  Goal: Establish and maintain regular urinary output  Outcome: Progressing     Problem: Bowel Elimination  Goal: Establish and maintain regular bowel function  Outcome: Progressing     Problem: Mobility - Stroke  Goal: Patient's capacity to carry out activities will improve  Outcome: Progressing  Goal: Spasticity will be prevented or improved  Outcome: Progressing  Goal: Subluxation will be prevented or improved  Outcome:  Progressing     Problem: Self Care  Goal: Patient will have the ability to perform ADLs independently or with assistance (bathe, groom, dress, toilet and feed)  Outcome: Progressing     Problem: Knowledge Deficit - Standard  Goal: Patient and family/care givers will demonstrate understanding of plan of care, disease process/condition, diagnostic tests and medications  Outcome: Progressing     Problem: Fall Risk  Goal: Patient will remain free from falls  Outcome: Progressing   The patient is Stable - Low risk of patient condition declining or worsening    Shift Goals  Clinical Goals: neurological checks, pain management  Patient Goals: pain control, room temperature  Family Goals: SHARATH    Progress made toward(s) clinical / shift goals:  medicated for pain prn. Stable neuro status    Patient is not progressing towards the following goals:

## 2023-09-28 NOTE — PROGRESS NOTES
"Hospital Medicine Daily Progress Note    Date of Service  9/28/2023    Chief Complaint  Zane Galeano is a 63 y.o. male admitted 9/23/2023 with aphasia    Hospital Course  Zane Galeano is a 62 y.o. male who presented 9/23/2023 with aphasia.  This is a pleasant man who reports a past history of 1 \"massive stroke\" 2 years ago after which she had aphasia which improved, and 3 TIAs with the most recent one 6 months ago.  Also reports a history of A-fib not on any medications and does not give a clear reason for this, as well as diabetes and chronic kidney disease.      He presents due to sudden onset aphasia after waking up from a nap at around 230 yesterday afternoon.  Last known normal was around noon when he went to bed from a nap and woke up and was unable to speak.  Apparently after that they went out for dinner and he choked on a hamburger which is what brought him into the ED.  Patient was able to dislodge the hamburger.      Given his new onset aphasia work-up for stroke was initiated, CT perfusion scan did show a penumbra of 16 mL, and CTA of the head showed M1 occlusions bilaterally with collaterals.  Neurology was consulted, Dr. Guillen, did not recommend any emergent interventions, recommended admission for MRI and speech therapy and general stroke management.  Patient was given aspirin.    Interval Problem Update  Pt comfortable aphasia stable, BPs and sugars at goal, PT/OT now saying home health but patient now states he is homeless and family will not take him back, attempting SNF placement for SLP needs      I have discussed this patient's plan of care and discharge plan at IDT rounds today with Case Management, Nursing, Nursing leadership, and other members of the IDT team.    Consultants/Specialty  Vascular neurology    Code Status  Full Code    Disposition  The patient is not medically cleared for discharge to home or a post-acute facility.  Anticipate discharge to: skilled nursing facility    I " have placed the appropriate orders for post-discharge needs.    Review of Systems  Review of Systems   Constitutional:  Negative for chills and fever.   HENT:  Negative for congestion.    Eyes:  Negative for blurred vision.   Respiratory:  Negative for shortness of breath.    Cardiovascular:  Negative for chest pain and leg swelling.   Gastrointestinal:  Negative for abdominal pain, constipation, diarrhea, nausea and vomiting.   Genitourinary:  Negative for dysuria.   Musculoskeletal:  Negative for myalgias.   Skin:  Negative for rash.   Neurological:  Positive for speech change. Negative for sensory change and focal weakness.        Physical Exam  Temp:  [36.6 °C (97.8 °F)-37 °C (98.6 °F)] 36.9 °C (98.4 °F)  Pulse:  [52-70] 60  Resp:  [15-17] 16  BP: (123-147)/(52-66) 138/66  SpO2:  [94 %-100 %] 100 %    Physical Exam  Constitutional:       Appearance: Normal appearance.   HENT:      Head: Normocephalic and atraumatic.      Nose: Nose normal.      Mouth/Throat:      Mouth: Mucous membranes are moist.      Pharynx: Oropharynx is clear.   Eyes:      Conjunctiva/sclera: Conjunctivae normal.      Pupils: Pupils are equal, round, and reactive to light.   Cardiovascular:      Rate and Rhythm: Normal rate and regular rhythm.      Heart sounds: No murmur heard.  Pulmonary:      Effort: Pulmonary effort is normal.      Breath sounds: No wheezing, rhonchi or rales.   Abdominal:      General: There is no distension.      Palpations: Abdomen is soft.      Tenderness: There is no abdominal tenderness. There is no guarding or rebound.   Musculoskeletal:         General: No swelling or tenderness.      Cervical back: Normal range of motion and neck supple.   Skin:     General: Skin is warm and dry.   Neurological:      Mental Status: He is alert and oriented to person, place, and time.      GCS: GCS eye subscore is 4. GCS verbal subscore is 5. GCS motor subscore is 6.      Cranial Nerves: Dysarthria and facial asymmetry present.    Psychiatric:         Mood and Affect: Mood normal.         Behavior: Behavior normal.         Fluids    Intake/Output Summary (Last 24 hours) at 9/28/2023 1611  Last data filed at 9/28/2023 0800  Gross per 24 hour   Intake 240 ml   Output 125 ml   Net 115 ml         Laboratory  Recent Labs     09/27/23  0413   WBC 10.5   RBC 3.72*   HEMOGLOBIN 10.1*   HEMATOCRIT 31.5*   MCV 84.7   MCH 27.2   MCHC 32.1*   RDW 43.8   PLATELETCT 337   MPV 9.3       Recent Labs     09/27/23  0413 09/28/23  0456   SODIUM 142 144   POTASSIUM 3.7 3.7   CHLORIDE 109 108   CO2 23 24   GLUCOSE 200* 124*   BUN 15 12   CREATININE 1.15 1.03   CALCIUM 8.6 8.4*                         Imaging  EC-ECHOCARDIOGRAM COMPLETE W/O CONT   Final Result      MR-BRAIN-W/O   Final Result      1.  Small areas of acute infarcts in the left parietal lobe.   2.  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 The blood vessels better evaluated on the recent the CT angiogram.   3.  Chronic infarct in the right temporal and parietal lobes.   4.  Mild chronic microvascular ischemic disease.   5.  Mild cerebral volume loss.      DX-ESOPHAGUS - [id number]   Final Result      CT-CTA NECK WITH & W/O-POST PROCESSING   Final Result      CT angiogram of the neck within normal limits.      CT-CTA HEAD WITH & W/O-POST PROCESS   Final Result      1.  Occlusion of the M1 segments of the middle cerebral artery is noted bilaterally. These occlusions are likely chronic and collaterals are present as described above.      2.  No other occlusion or filling defect identified.      3.  These findings were discussed with DEVON MEJÍA on 09/24/2023.      CT-CEREBRAL PERFUSION ANALYSIS   Final Result      1.  Cerebral blood flow less than 30% likely representing completed infarct = 28 mL.      2.  T Max more than 6 seconds likely representing combination of completed infarct and ischemia = 44 mL.      3.  Mismatched  volume likely representing ischemic brain/penumbra = 16 mL      4.  Please note that the cerebral perfusion was performed on the limited brain tissue around the basal ganglia region. Infarct/ischemia outside the CT perfusion sections can be missed in this study.      CT-HEAD W/O   Final Result         NO ACUTE ABNORMALITIES ARE NOTED ON CT SCAN OF THE HEAD.      Findings are consistent with atrophy.  Decreased attenuation in the periventricular white matter likely indicates microvascular ischemic disease.         DX-CHEST-PORTABLE (1 VIEW)   Final Result         1.  Possible scarring or atelectasis in the left lung base. Aspiration is also possible.      2.  No other infiltrates or consolidations identified.             Assessment/Plan  * Acute CVA (cerebrovascular accident) (HCC)- (present on admission)  Assessment & Plan  With aphemia. Unable to form words, however full comprehension and preserved written language fluency.  History of 1 stroke and 3 TIAs per patient, stroke 2 years ago left him with aphasia that improved back to normal apparently.  MRI showed Small areas of acute infarcts in the left parietal lobe and signs concerning for Moyamoya  Echo showed normal LVEF and RVEF with mild MR  -Goal normotension.   -PT/OT/SLP  -Eliquis and statin  -Neurology consulted, recommend follow up with Neuro-IR within 2-3 weeks for diagnostic angiogram to identify potential grafts for EDAS following this will need follow up with Dr. Emili Allsion with [facility] within 4-6 weeks to undergo indirect bypass (EDAS)    Hypertension- (present on admission)  Assessment & Plan  Goal normotension   home amlodipine, added losartan  PRN labetalol, with hydralazine as backup if HR low    AF (atrial fibrillation) (HCC)- (present on admission)  Assessment & Plan  Patient reports history of A-fib.  Not on any medications for this  -Continue Eliquis    Type 2 diabetes mellitus with circulatory disorder, without long-term current  use of insulin (HCC)  Assessment & Plan  Hemoglobin A1c 7.5  Insulin basal plus correctional      VTE prophylaxis:    therapeutic anticoagulation with eliquis 5 mg BID      I have performed a physical exam and reviewed and updated ROS and Plan today (9/28/2023). In review of yesterday's note (9/27/2023), there are no changes except as documented above.

## 2023-09-28 NOTE — PROGRESS NOTES
Pt cleared for home with HH by therapies. Pending discharge. Per Dr. Dangelo discontinue bed alarm. Pt ambulating ad antoni.

## 2023-09-28 NOTE — CARE PLAN
The patient is Stable - Low risk of patient condition declining or worsening    Shift Goals  Clinical Goals: neurological checks, pain management  Patient Goals: pain control, room temperature  Family Goals: SHARATH    Progress made toward(s) clinical / shift goals:  Neurological checks have remained free of new deficits. Pain has been well managed throughout this shift. Room temperature was raised per patient's request.     Patient is progressing towards the following goals:    Problem: Pain - Standard  Goal: Alleviation of pain or a reduction in pain to the patient’s comfort goal  Outcome: Progressing  Note: Patient's pain control is managed with oxycodone.     Problem: Psychosocial - Patient Condition  Goal: Patient's ability to verbalize feelings about condition will improve  Outcome: Progressing  Note: Patient was agitated at the beginning of shift but as soon as his needs were addressed, he became calm. Talking about baseball helps relate to the patient.

## 2023-09-29 LAB
GLUCOSE BLD STRIP.AUTO-MCNC: 123 MG/DL (ref 65–99)
GLUCOSE BLD STRIP.AUTO-MCNC: 127 MG/DL (ref 65–99)
GLUCOSE BLD STRIP.AUTO-MCNC: 149 MG/DL (ref 65–99)
GLUCOSE BLD STRIP.AUTO-MCNC: 259 MG/DL (ref 65–99)

## 2023-09-29 PROCEDURE — 700111 HCHG RX REV CODE 636 W/ 250 OVERRIDE (IP)

## 2023-09-29 PROCEDURE — 700102 HCHG RX REV CODE 250 W/ 637 OVERRIDE(OP)

## 2023-09-29 PROCEDURE — 700102 HCHG RX REV CODE 250 W/ 637 OVERRIDE(OP): Performed by: STUDENT IN AN ORGANIZED HEALTH CARE EDUCATION/TRAINING PROGRAM

## 2023-09-29 PROCEDURE — A9270 NON-COVERED ITEM OR SERVICE: HCPCS

## 2023-09-29 PROCEDURE — A9270 NON-COVERED ITEM OR SERVICE: HCPCS | Performed by: HOSPITALIST

## 2023-09-29 PROCEDURE — 700102 HCHG RX REV CODE 250 W/ 637 OVERRIDE(OP): Performed by: HOSPITALIST

## 2023-09-29 PROCEDURE — A9270 NON-COVERED ITEM OR SERVICE: HCPCS | Performed by: STUDENT IN AN ORGANIZED HEALTH CARE EDUCATION/TRAINING PROGRAM

## 2023-09-29 PROCEDURE — 770001 HCHG ROOM/CARE - MED/SURG/GYN PRIV*

## 2023-09-29 PROCEDURE — 82962 GLUCOSE BLOOD TEST: CPT | Mod: 91

## 2023-09-29 PROCEDURE — 99232 SBSQ HOSP IP/OBS MODERATE 35: CPT | Performed by: STUDENT IN AN ORGANIZED HEALTH CARE EDUCATION/TRAINING PROGRAM

## 2023-09-29 RX ORDER — LOSARTAN POTASSIUM 50 MG/1
50 TABLET ORAL
Status: DISCONTINUED | OUTPATIENT
Start: 2023-09-30 | End: 2023-10-04 | Stop reason: HOSPADM

## 2023-09-29 RX ADMIN — LOSARTAN POTASSIUM 25 MG: 50 TABLET, FILM COATED ORAL at 05:48

## 2023-09-29 RX ADMIN — INSULIN LISPRO 3 UNITS: 100 INJECTION, SOLUTION INTRAVENOUS; SUBCUTANEOUS at 20:51

## 2023-09-29 RX ADMIN — APIXABAN 5 MG: 5 TABLET, FILM COATED ORAL at 05:48

## 2023-09-29 RX ADMIN — HYDRALAZINE HYDROCHLORIDE 10 MG: 20 INJECTION, SOLUTION INTRAMUSCULAR; INTRAVENOUS at 20:43

## 2023-09-29 RX ADMIN — APIXABAN 5 MG: 5 TABLET, FILM COATED ORAL at 16:40

## 2023-09-29 RX ADMIN — ATORVASTATIN CALCIUM 40 MG: 40 TABLET, FILM COATED ORAL at 16:40

## 2023-09-29 RX ADMIN — AMLODIPINE BESYLATE 10 MG: 5 TABLET ORAL at 05:48

## 2023-09-29 RX ADMIN — OXYCODONE HYDROCHLORIDE 5 MG: 5 TABLET ORAL at 06:06

## 2023-09-29 ASSESSMENT — ENCOUNTER SYMPTOMS
SPEECH CHANGE: 1
FOCAL WEAKNESS: 0
CONSTIPATION: 0
VOMITING: 0
BLURRED VISION: 0
DIARRHEA: 0
MYALGIAS: 0
CHILLS: 0
FEVER: 0
SHORTNESS OF BREATH: 0
ABDOMINAL PAIN: 0
SENSORY CHANGE: 0
NAUSEA: 0

## 2023-09-29 ASSESSMENT — PAIN DESCRIPTION - PAIN TYPE
TYPE: NEUROPATHIC PAIN
TYPE: NEUROPATHIC PAIN
TYPE: ACUTE PAIN;NEUROPATHIC PAIN
TYPE: ACUTE PAIN

## 2023-09-29 NOTE — PROGRESS NOTES
"Hospital Medicine Daily Progress Note    Date of Service  9/29/2023    Chief Complaint  Zane Galeano is a 63 y.o. male admitted 9/23/2023 with aphasia    Hospital Course  Zane Galeano is a 62 y.o. male who presented 9/23/2023 with aphasia.  This is a pleasant man who reports a past history of 1 \"massive stroke\" 2 years ago after which she had aphasia which improved, and 3 TIAs with the most recent one 6 months ago.  Also reports a history of A-fib not on any medications and does not give a clear reason for this, as well as diabetes and chronic kidney disease.      He presents due to sudden onset aphasia after waking up from a nap at around 230 yesterday afternoon.  Last known normal was around noon when he went to bed from a nap and woke up and was unable to speak.  Apparently after that they went out for dinner and he choked on a hamburger which is what brought him into the ED.  Patient was able to dislodge the hamburger.      Given his new onset aphasia work-up for stroke was initiated, CT perfusion scan did show a penumbra of 16 mL, and CTA of the head showed M1 occlusions bilaterally with collaterals.  Neurology was consulted, Dr. Guillen, did not recommend any emergent interventions, recommended admission for MRI and speech therapy and general stroke management.  Patient was given aspirin.    Interval Problem Update  Pt comfortable aphasia stable, sugars at goal, BP elevated so will increase losartan, attempting SNF placement for SLP needs if not pt looking into homeless shelters      I have discussed this patient's plan of care and discharge plan at IDT rounds today with Case Management, Nursing, Nursing leadership, and other members of the IDT team.    Consultants/Specialty  Vascular neurology    Code Status  Full Code    Disposition  The patient is not medically cleared for discharge to home or a post-acute facility.  Anticipate discharge to: skilled nursing facility    I have placed the appropriate " orders for post-discharge needs.    Review of Systems  Review of Systems   Constitutional:  Negative for chills and fever.   HENT:  Negative for congestion.    Eyes:  Negative for blurred vision.   Respiratory:  Negative for shortness of breath.    Cardiovascular:  Negative for chest pain and leg swelling.   Gastrointestinal:  Negative for abdominal pain, constipation, diarrhea, nausea and vomiting.   Genitourinary:  Negative for dysuria.   Musculoskeletal:  Negative for myalgias.   Skin:  Negative for rash.   Neurological:  Positive for speech change. Negative for sensory change and focal weakness.        Physical Exam  Temp:  [36.9 °C (98.4 °F)-37.1 °C (98.8 °F)] 37 °C (98.6 °F)  Pulse:  [54-67] 59  Resp:  [15-17] 16  BP: (109-143)/(60-71) 109/60  SpO2:  [93 %-96 %] 95 %    Physical Exam  Constitutional:       Appearance: Normal appearance.   HENT:      Head: Normocephalic and atraumatic.      Nose: Nose normal.      Mouth/Throat:      Mouth: Mucous membranes are moist.      Pharynx: Oropharynx is clear.   Eyes:      Conjunctiva/sclera: Conjunctivae normal.      Pupils: Pupils are equal, round, and reactive to light.   Cardiovascular:      Rate and Rhythm: Normal rate and regular rhythm.      Heart sounds: No murmur heard.  Pulmonary:      Effort: Pulmonary effort is normal.      Breath sounds: No wheezing, rhonchi or rales.   Abdominal:      General: There is no distension.      Palpations: Abdomen is soft.      Tenderness: There is no abdominal tenderness. There is no guarding or rebound.   Musculoskeletal:         General: No swelling or tenderness.      Cervical back: Normal range of motion and neck supple.   Skin:     General: Skin is warm and dry.   Neurological:      Mental Status: He is alert and oriented to person, place, and time.      GCS: GCS eye subscore is 4. GCS verbal subscore is 5. GCS motor subscore is 6.      Cranial Nerves: Dysarthria and facial asymmetry present.   Psychiatric:         Mood  and Affect: Mood normal.         Behavior: Behavior normal.         Fluids    Intake/Output Summary (Last 24 hours) at 9/29/2023 1643  Last data filed at 9/29/2023 1538  Gross per 24 hour   Intake 440 ml   Output --   Net 440 ml         Laboratory  Recent Labs     09/27/23  0413   WBC 10.5   RBC 3.72*   HEMOGLOBIN 10.1*   HEMATOCRIT 31.5*   MCV 84.7   MCH 27.2   MCHC 32.1*   RDW 43.8   PLATELETCT 337   MPV 9.3       Recent Labs     09/27/23  0413 09/28/23  0456   SODIUM 142 144   POTASSIUM 3.7 3.7   CHLORIDE 109 108   CO2 23 24   GLUCOSE 200* 124*   BUN 15 12   CREATININE 1.15 1.03   CALCIUM 8.6 8.4*                         Imaging  EC-ECHOCARDIOGRAM COMPLETE W/O CONT   Final Result      MR-BRAIN-W/O   Final Result      1.  Small areas of acute infarcts in the left parietal lobe.   2.  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 The blood vessels better evaluated on the recent the CT angiogram.   3.  Chronic infarct in the right temporal and parietal lobes.   4.  Mild chronic microvascular ischemic disease.   5.  Mild cerebral volume loss.      DX-ESOPHAGUS - [id number]   Final Result      CT-CTA NECK WITH & W/O-POST PROCESSING   Final Result      CT angiogram of the neck within normal limits.      CT-CTA HEAD WITH & W/O-POST PROCESS   Final Result      1.  Occlusion of the M1 segments of the middle cerebral artery is noted bilaterally. These occlusions are likely chronic and collaterals are present as described above.      2.  No other occlusion or filling defect identified.      3.  These findings were discussed with DEVON MEJÍA on 09/24/2023.      CT-CEREBRAL PERFUSION ANALYSIS   Final Result      1.  Cerebral blood flow less than 30% likely representing completed infarct = 28 mL.      2.  T Max more than 6 seconds likely representing combination of completed infarct and ischemia = 44 mL.      3.  Mismatched volume likely representing  ischemic brain/penumbra = 16 mL      4.  Please note that the cerebral perfusion was performed on the limited brain tissue around the basal ganglia region. Infarct/ischemia outside the CT perfusion sections can be missed in this study.      CT-HEAD W/O   Final Result         NO ACUTE ABNORMALITIES ARE NOTED ON CT SCAN OF THE HEAD.      Findings are consistent with atrophy.  Decreased attenuation in the periventricular white matter likely indicates microvascular ischemic disease.         DX-CHEST-PORTABLE (1 VIEW)   Final Result         1.  Possible scarring or atelectasis in the left lung base. Aspiration is also possible.      2.  No other infiltrates or consolidations identified.             Assessment/Plan  * Acute CVA (cerebrovascular accident) (HCC)- (present on admission)  Assessment & Plan  With aphemia. Unable to form words, however full comprehension and preserved written language fluency.  History of 1 stroke and 3 TIAs per patient, stroke 2 years ago left him with aphasia that improved back to normal apparently.  MRI showed Small areas of acute infarcts in the left parietal lobe and signs concerning for Moyamoya  Echo showed normal LVEF and RVEF with mild MR  -Goal normotension.   -PT/OT/SLP  -Eliquis and statin  -Neurology consulted, recommend follow up with Neuro-IR within 2-3 weeks for diagnostic angiogram to identify potential grafts for EDAS following this will need follow up with Dr. Emili Allison with [facility] within 4-6 weeks to undergo indirect bypass (EDAS)    Hypertension- (present on admission)  Assessment & Plan  Goal normotension   home amlodipine, added losartan  PRN labetalol, with hydralazine as backup if HR low    AF (atrial fibrillation) (HCC)- (present on admission)  Assessment & Plan  Patient reports history of A-fib.  Not on any medications for this  -Continue Eliquis    Type 2 diabetes mellitus with circulatory disorder, without long-term current use of insulin  (HCC)  Assessment & Plan  Hemoglobin A1c 7.5  Insulin basal plus correctional      VTE prophylaxis:    therapeutic anticoagulation with eliquis 5 mg BID      I have performed a physical exam and reviewed and updated ROS and Plan today (9/29/2023). In review of yesterday's note (9/28/2023), there are no changes except as documented above.

## 2023-09-29 NOTE — PROGRESS NOTES
Patient refusing bed alarm and insisting on being up self. Dr. Dangelo is aware and ok with this. Notified charge RN.

## 2023-09-29 NOTE — DISCHARGE PLANNING
Agency/Facility Name: [facility]  Spoke To: Aldo  Outcome: DPA called facility to ask if they will start insurance auth  Per Aldo, she will start the process

## 2023-09-29 NOTE — DISCHARGE PLANNING
Case Management Discharge Planning    Admission Date: 9/23/2023  GMLOS: 2.9  ALOS: 5    6-Clicks ADL Score: 19  6-Clicks Mobility Score: 22      Anticipated Discharge Dispo: Discharge Disposition: D/T to SNF with Medicare cert in anticipation of skilled care (03)  Discharge Address: [address] [place] [address] [place] [address]    DME Needed: No    Action(s) Taken: OTHER    LSW met with pt to discuss his DC plan. Pt states he does not have anywhere to go and is looking into getting into a homeless shelter. LSW informed pt that PT/OT are recommending HH and speech therapy is recommending post-acute placement. Pt is ambulatory and may be a good candidate for [facility] recuperative home. LSW asked pt if he would be interested in the recuperative home, LSW informed pt of the requirement and he is agreeable. [facility] and emailed it to  leadership.     Escalations Completed: None    Medically Clear: Yes    Next Steps:  f/u with pt and medical team to discuss dc needs and barriers.     Barriers to Discharge: Pending Placement, Transportation, and Homelessness

## 2023-09-29 NOTE — CARE PLAN
The patient is Stable - Low risk of patient condition declining or worsening    Shift Goals  Clinical Goals: stable neuro assessments, pain management, agitation  Patient Goals: rest  Family Goals: SHARATH    Progress made toward(s) clinical / shift goals:  Pain has been managed with oxycodone and localized to the feet. Mood has remained calm during this shift. Rest has been maintained.     Patient is progressing towards the following goals:    Problem: Neuro Status  Goal: Neuro status will remain stable or improve  Outcome: Progressing  Note: Patient has remained free of any new deficits during this shift and has had clearer speech since the start of shift.      Problem: Urinary Elimination  Goal: Establish and maintain regular urinary output  Outcome: Progressing  Note: Patient voids in the bathroom and has had no accidents in the bed during this shift.

## 2023-09-29 NOTE — CARE PLAN
The patient is Stable - Low risk of patient condition declining or worsening    Shift Goals  Clinical Goals: Discharge  Patient Goals: Be left alone  Family Goals: SHARATH    Progress made toward(s) clinical / shift goals:    Problem: Knowledge Deficit - Stroke Education  Goal: Patient's knowledge of stroke and risk factors will improve  Description: Target End Date:  1-3 days or as soon as patient condition allows    Document in Patient Education    1.  Stroke education booklet provided  2.  Education regarding EMS activation, need for follow up, medication prescribed at discharge, risk factors for stroke/lifestyle modifications, warning signs and symptoms of stroke provided  9/29/2023 1249 by Lizett Tiwari R.N.  Outcome: Progressing  9/29/2023 1248 by Lizett Tiwari R.N.  Outcome: Progressing     Problem: Neuro Status  Goal: Neuro status will remain stable or improve  Description: Target End Date:  Prior to discharge or change in level of care    Document on Neuro assessment in the Assessment flowsheet    1.  Assess and monitor neurologic status per provider order/protocol/unit policy  2.  Assess level of consciousness and orientation  3.  Assess for speech, dysarthria, dysphagia, facial symmetry  4.  Assess visual field, eye movements, gaze preference, pupil reaction and size  5.  Assess muscle strength and motor response in all four extremities  6.  Assess for sensation (numbness and tingling)  7.  Assess basic neuro reflexes (cough, gag, corneal)  8.  Identify changes in neuro status and report to provider for testing/treatment orders  9/29/2023 1249 by Lizett Tiwari RDAVIS.  Outcome: Progressing  9/29/2023 1248 by Lizett Tiwari R.N.  Outcome: Progressing       Patient is not progressing towards the following goals:

## 2023-09-30 LAB
GLUCOSE BLD STRIP.AUTO-MCNC: 102 MG/DL (ref 65–99)
GLUCOSE BLD STRIP.AUTO-MCNC: 145 MG/DL (ref 65–99)
GLUCOSE BLD STRIP.AUTO-MCNC: 147 MG/DL (ref 65–99)
GLUCOSE BLD STRIP.AUTO-MCNC: 219 MG/DL (ref 65–99)
GLUCOSE BLD STRIP.AUTO-MCNC: 90 MG/DL (ref 65–99)

## 2023-09-30 PROCEDURE — A9270 NON-COVERED ITEM OR SERVICE: HCPCS | Performed by: HOSPITALIST

## 2023-09-30 PROCEDURE — 700102 HCHG RX REV CODE 250 W/ 637 OVERRIDE(OP): Performed by: HOSPITALIST

## 2023-09-30 PROCEDURE — 700102 HCHG RX REV CODE 250 W/ 637 OVERRIDE(OP): Performed by: STUDENT IN AN ORGANIZED HEALTH CARE EDUCATION/TRAINING PROGRAM

## 2023-09-30 PROCEDURE — A9270 NON-COVERED ITEM OR SERVICE: HCPCS

## 2023-09-30 PROCEDURE — 770001 HCHG ROOM/CARE - MED/SURG/GYN PRIV*

## 2023-09-30 PROCEDURE — A9270 NON-COVERED ITEM OR SERVICE: HCPCS | Performed by: STUDENT IN AN ORGANIZED HEALTH CARE EDUCATION/TRAINING PROGRAM

## 2023-09-30 PROCEDURE — 82962 GLUCOSE BLOOD TEST: CPT | Mod: 91

## 2023-09-30 PROCEDURE — 700102 HCHG RX REV CODE 250 W/ 637 OVERRIDE(OP)

## 2023-09-30 PROCEDURE — 99231 SBSQ HOSP IP/OBS SF/LOW 25: CPT | Performed by: STUDENT IN AN ORGANIZED HEALTH CARE EDUCATION/TRAINING PROGRAM

## 2023-09-30 RX ADMIN — AMLODIPINE BESYLATE 10 MG: 5 TABLET ORAL at 05:10

## 2023-09-30 RX ADMIN — SCOPOLAMINE 1 PATCH: 1.5 PATCH, EXTENDED RELEASE TRANSDERMAL at 02:41

## 2023-09-30 RX ADMIN — ATORVASTATIN CALCIUM 40 MG: 40 TABLET, FILM COATED ORAL at 16:34

## 2023-09-30 RX ADMIN — APIXABAN 5 MG: 5 TABLET, FILM COATED ORAL at 16:34

## 2023-09-30 RX ADMIN — INSULIN LISPRO 2 UNITS: 100 INJECTION, SOLUTION INTRAVENOUS; SUBCUTANEOUS at 22:31

## 2023-09-30 RX ADMIN — APIXABAN 5 MG: 5 TABLET, FILM COATED ORAL at 05:11

## 2023-09-30 RX ADMIN — OXYCODONE HYDROCHLORIDE 2.5 MG: 5 TABLET ORAL at 16:33

## 2023-09-30 RX ADMIN — LOSARTAN POTASSIUM 50 MG: 50 TABLET, FILM COATED ORAL at 05:11

## 2023-09-30 ASSESSMENT — PAIN DESCRIPTION - PAIN TYPE
TYPE: ACUTE PAIN

## 2023-09-30 ASSESSMENT — ENCOUNTER SYMPTOMS
SENSORY CHANGE: 0
ABDOMINAL PAIN: 0
MYALGIAS: 0
VOMITING: 0
SHORTNESS OF BREATH: 0
SPEECH CHANGE: 1
FEVER: 0
CHILLS: 0
BLURRED VISION: 0
DIARRHEA: 0
FOCAL WEAKNESS: 0
NAUSEA: 0
CONSTIPATION: 0

## 2023-09-30 NOTE — DISCHARGE PLANNING
Case Management Discharge Planning    Admission Date: 9/23/2023  GMLOS: 2.9  ALOS: 6    6-Clicks ADL Score: 19  6-Clicks Mobility Score: 22      Anticipated Discharge Dispo: Discharge Disposition: D/T to SNF with Medicare cert in anticipation of skilled care (03)  Discharge Address: [address] [place] [address] [place] [address]    DME Needed: No    Action(s) Taken: OTHER    Escalations Completed: None    Medically Clear: Yes    Barriers to Discharge: Pending Placement    **[id number]  Unable to reach Katharine with [facility]. Attempted various times.

## 2023-09-30 NOTE — PROGRESS NOTES
"Hospital Medicine Daily Progress Note    Date of Service  9/30/2023    Chief Complaint  Zane Galeano is a 63 y.o. male admitted 9/23/2023 with aphasia    Hospital Course  Zane Galeano is a 62 y.o. male who presented 9/23/2023 with aphasia.  This is a pleasant man who reports a past history of 1 \"massive stroke\" 2 years ago after which she had aphasia which improved, and 3 TIAs with the most recent one 6 months ago.  Also reports a history of A-fib not on any medications and does not give a clear reason for this, as well as diabetes and chronic kidney disease.      He presents due to sudden onset aphasia after waking up from a nap at around 230 yesterday afternoon.  Last known normal was around noon when he went to bed from a nap and woke up and was unable to speak.  Apparently after that they went out for dinner and he choked on a hamburger which is what brought him into the ED.  Patient was able to dislodge the hamburger.      Given his new onset aphasia work-up for stroke was initiated, CT perfusion scan did show a penumbra of 16 mL, and CTA of the head showed M1 occlusions bilaterally with collaterals.  Neurology was consulted, Dr. Guillen, did not recommend any emergent interventions, recommended admission for MRI and speech therapy and general stroke management.  Patient was given aspirin.    Interval Problem Update  Pt comfortable aphasia stable, showed him his MRI per request, attempting SNF placement for SLP needs if not pt looking into homeless shelters      I have discussed this patient's plan of care and discharge plan at IDT rounds today with Case Management, Nursing, Nursing leadership, and other members of the IDT team.    Consultants/Specialty  Vascular neurology    Code Status  Full Code    Disposition  Medically Cleared  I have placed the appropriate orders for post-discharge needs.    Review of Systems  Review of Systems   Constitutional:  Negative for chills and fever.   HENT:  Negative for " congestion.    Eyes:  Negative for blurred vision.   Respiratory:  Negative for shortness of breath.    Cardiovascular:  Negative for chest pain and leg swelling.   Gastrointestinal:  Negative for abdominal pain, constipation, diarrhea, nausea and vomiting.   Genitourinary:  Negative for dysuria.   Musculoskeletal:  Negative for myalgias.   Skin:  Negative for rash.   Neurological:  Positive for speech change. Negative for sensory change and focal weakness.        Physical Exam  Temp:  [37 °C (98.6 °F)-37.3 °C (99.1 °F)] 37.3 °C (99.1 °F)  Pulse:  [54-59] 54  Resp:  [14-18] 17  BP: (109-154)/(46-72) 120/61  SpO2:  [92 %-95 %] 95 %    Physical Exam  Constitutional:       Appearance: Normal appearance.   HENT:      Head: Normocephalic and atraumatic.      Nose: Nose normal.      Mouth/Throat:      Mouth: Mucous membranes are moist.      Pharynx: Oropharynx is clear.   Eyes:      Conjunctiva/sclera: Conjunctivae normal.      Pupils: Pupils are equal, round, and reactive to light.   Cardiovascular:      Rate and Rhythm: Normal rate and regular rhythm.      Heart sounds: No murmur heard.  Pulmonary:      Effort: Pulmonary effort is normal.      Breath sounds: No wheezing, rhonchi or rales.   Abdominal:      General: There is no distension.      Palpations: Abdomen is soft.      Tenderness: There is no abdominal tenderness. There is no guarding or rebound.   Musculoskeletal:         General: No swelling or tenderness.      Cervical back: Normal range of motion and neck supple.   Skin:     General: Skin is warm and dry.   Neurological:      Mental Status: He is alert and oriented to person, place, and time.      GCS: GCS eye subscore is 4. GCS verbal subscore is 5. GCS motor subscore is 6.      Cranial Nerves: Dysarthria and facial asymmetry present.   Psychiatric:         Mood and Affect: Mood normal.         Behavior: Behavior normal.         Fluids    Intake/Output Summary (Last 24 hours) at 9/30/2023 1342  Last data  filed at 9/30/2023 1000  Gross per 24 hour   Intake 440 ml   Output --   Net 440 ml         Laboratory        Recent Labs     09/28/23  0456   SODIUM 144   POTASSIUM 3.7   CHLORIDE 108   CO2 24   GLUCOSE 124*   BUN 12   CREATININE 1.03   CALCIUM 8.4*                         Imaging  EC-ECHOCARDIOGRAM COMPLETE W/O CONT   Final Result      MR-BRAIN-W/O   Final Result      1.  Small areas of acute infarcts in the left parietal lobe.   2.  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 The blood vessels better evaluated on the recent the CT angiogram.   3.  Chronic infarct in the right temporal and parietal lobes.   4.  Mild chronic microvascular ischemic disease.   5.  Mild cerebral volume loss.      DX-ESOPHAGUS - [id number]   Final Result      CT-CTA NECK WITH & W/O-POST PROCESSING   Final Result      CT angiogram of the neck within normal limits.      CT-CTA HEAD WITH & W/O-POST PROCESS   Final Result      1.  Occlusion of the M1 segments of the middle cerebral artery is noted bilaterally. These occlusions are likely chronic and collaterals are present as described above.      2.  No other occlusion or filling defect identified.      3.  These findings were discussed with DEVON MEJÍA on 09/24/2023.      CT-CEREBRAL PERFUSION ANALYSIS   Final Result      1.  Cerebral blood flow less than 30% likely representing completed infarct = 28 mL.      2.  T Max more than 6 seconds likely representing combination of completed infarct and ischemia = 44 mL.      3.  Mismatched volume likely representing ischemic brain/penumbra = 16 mL      4.  Please note that the cerebral perfusion was performed on the limited brain tissue around the basal ganglia region. Infarct/ischemia outside the CT perfusion sections can be missed in this study.      CT-HEAD W/O   Final Result         NO ACUTE ABNORMALITIES ARE NOTED ON CT SCAN OF THE HEAD.      Findings are consistent  with atrophy.  Decreased attenuation in the periventricular white matter likely indicates microvascular ischemic disease.         DX-CHEST-PORTABLE (1 VIEW)   Final Result         1.  Possible scarring or atelectasis in the left lung base. Aspiration is also possible.      2.  No other infiltrates or consolidations identified.             Assessment/Plan  * Acute CVA (cerebrovascular accident) (HCC)- (present on admission)  Assessment & Plan  With aphemia. Unable to form words, however full comprehension and preserved written language fluency.  History of 1 stroke and 3 TIAs per patient, stroke 2 years ago left him with aphasia that improved back to normal apparently.  MRI showed Small areas of acute infarcts in the left parietal lobe and signs concerning for Moyamoya  Echo showed normal LVEF and RVEF with mild MR  -Goal normotension.   -PT/OT/SLP  -Eliquis and statin  -Neurology consulted, recommend follow up with Neuro-IR within 2-3 weeks for diagnostic angiogram to identify potential grafts for EDAS following this will need follow up with Dr. Emili Allison with [facility] within 4-6 weeks to undergo indirect bypass (EDAS)    Hypertension- (present on admission)  Assessment & Plan  Goal normotension   home amlodipine, added losartan  PRN labetalol, with hydralazine as backup if HR low    AF (atrial fibrillation) (HCC)- (present on admission)  Assessment & Plan  Patient reports history of A-fib.  Not on any medications for this  -Continue Eliquis    Type 2 diabetes mellitus with circulatory disorder, without long-term current use of insulin (HCC)  Assessment & Plan  Hemoglobin A1c 7.5  Insulin basal plus correctional      VTE prophylaxis:    therapeutic anticoagulation with eliquis 5 mg BID      I have performed a physical exam and reviewed and updated ROS and Plan today (9/30/2023). In review of yesterday's note (9/29/2023), there are no changes except as documented above.

## 2023-09-30 NOTE — CARE PLAN
The patient is Stable - Low risk of patient condition declining or worsening    Shift Goals  Clinical Goals: Discharge, Neuro Checks  Patient Goals: Rest, Go home  Family Goals: SHARATH    Progress made toward(s) clinical / shift goals:    Problem: Optimal Care of the Stroke Patient  Goal: Optimal emergency care for the stroke patient  Outcome: Progressing     Problem: Knowledge Deficit - Stroke Education  Goal: Patient's knowledge of stroke and risk factors will improve  Outcome: Progressing     Problem: Psychosocial - Patient Condition  Goal: Patient's ability to verbalize feelings about condition will improve  Outcome: Progressing     Problem: Discharge Planning - Stroke  Goal: Ensure Stroke Core Measures are met prior to discharge  Outcome: Progressing       Patient is not progressing towards the following goals:

## 2023-10-01 LAB
ANION GAP SERPL CALC-SCNC: 12 MMOL/L (ref 7–16)
BUN SERPL-MCNC: 18 MG/DL (ref 8–22)
CALCIUM SERPL-MCNC: 9 MG/DL (ref 8.5–10.5)
CHLORIDE SERPL-SCNC: 101 MMOL/L (ref 96–112)
CO2 SERPL-SCNC: 23 MMOL/L (ref 20–33)
CREAT SERPL-MCNC: 1.15 MG/DL (ref 0.5–1.4)
GFR SERPLBLD CREATININE-BSD FMLA CKD-EPI: 71 ML/MIN/1.73 M 2
GLUCOSE BLD STRIP.AUTO-MCNC: 143 MG/DL (ref 65–99)
GLUCOSE BLD STRIP.AUTO-MCNC: 186 MG/DL (ref 65–99)
GLUCOSE BLD STRIP.AUTO-MCNC: 210 MG/DL (ref 65–99)
GLUCOSE BLD STRIP.AUTO-MCNC: 263 MG/DL (ref 65–99)
GLUCOSE SERPL-MCNC: 240 MG/DL (ref 65–99)
MAGNESIUM SERPL-MCNC: 2 MG/DL (ref 1.5–2.5)
POTASSIUM SERPL-SCNC: 4.1 MMOL/L (ref 3.6–5.5)
SODIUM SERPL-SCNC: 136 MMOL/L (ref 135–145)

## 2023-10-01 PROCEDURE — 99231 SBSQ HOSP IP/OBS SF/LOW 25: CPT | Performed by: STUDENT IN AN ORGANIZED HEALTH CARE EDUCATION/TRAINING PROGRAM

## 2023-10-01 PROCEDURE — 700102 HCHG RX REV CODE 250 W/ 637 OVERRIDE(OP)

## 2023-10-01 PROCEDURE — 36415 COLL VENOUS BLD VENIPUNCTURE: CPT

## 2023-10-01 PROCEDURE — 80048 BASIC METABOLIC PNL TOTAL CA: CPT

## 2023-10-01 PROCEDURE — A9270 NON-COVERED ITEM OR SERVICE: HCPCS | Performed by: HOSPITALIST

## 2023-10-01 PROCEDURE — 700102 HCHG RX REV CODE 250 W/ 637 OVERRIDE(OP): Performed by: HOSPITALIST

## 2023-10-01 PROCEDURE — A9270 NON-COVERED ITEM OR SERVICE: HCPCS | Performed by: STUDENT IN AN ORGANIZED HEALTH CARE EDUCATION/TRAINING PROGRAM

## 2023-10-01 PROCEDURE — A9270 NON-COVERED ITEM OR SERVICE: HCPCS

## 2023-10-01 PROCEDURE — 82962 GLUCOSE BLOOD TEST: CPT | Mod: 91

## 2023-10-01 PROCEDURE — 770001 HCHG ROOM/CARE - MED/SURG/GYN PRIV*

## 2023-10-01 PROCEDURE — 700102 HCHG RX REV CODE 250 W/ 637 OVERRIDE(OP): Performed by: STUDENT IN AN ORGANIZED HEALTH CARE EDUCATION/TRAINING PROGRAM

## 2023-10-01 PROCEDURE — 83735 ASSAY OF MAGNESIUM: CPT

## 2023-10-01 RX ADMIN — ATORVASTATIN CALCIUM 40 MG: 40 TABLET, FILM COATED ORAL at 17:15

## 2023-10-01 RX ADMIN — INSULIN LISPRO 2 UNITS: 100 INJECTION, SOLUTION INTRAVENOUS; SUBCUTANEOUS at 12:21

## 2023-10-01 RX ADMIN — LOSARTAN POTASSIUM 50 MG: 50 TABLET, FILM COATED ORAL at 06:14

## 2023-10-01 RX ADMIN — APIXABAN 5 MG: 5 TABLET, FILM COATED ORAL at 06:14

## 2023-10-01 RX ADMIN — INSULIN LISPRO 3 UNITS: 100 INJECTION, SOLUTION INTRAVENOUS; SUBCUTANEOUS at 17:15

## 2023-10-01 RX ADMIN — OXYCODONE HYDROCHLORIDE 2.5 MG: 5 TABLET ORAL at 12:22

## 2023-10-01 RX ADMIN — AMLODIPINE BESYLATE 10 MG: 5 TABLET ORAL at 06:15

## 2023-10-01 RX ADMIN — APIXABAN 5 MG: 5 TABLET, FILM COATED ORAL at 17:15

## 2023-10-01 ASSESSMENT — ENCOUNTER SYMPTOMS
SHORTNESS OF BREATH: 0
SENSORY CHANGE: 0
CONSTIPATION: 0
SPEECH CHANGE: 1
FEVER: 0
VOMITING: 0
BLURRED VISION: 0
CHILLS: 0
FOCAL WEAKNESS: 0
ABDOMINAL PAIN: 0
DIARRHEA: 0
MYALGIAS: 0
NAUSEA: 0

## 2023-10-01 ASSESSMENT — PAIN DESCRIPTION - PAIN TYPE
TYPE: ACUTE PAIN

## 2023-10-01 NOTE — CARE PLAN
The patient is Watcher - Medium risk of patient condition declining or worsening    Shift Goals  Clinical Goals: Stable neuro checks  Patient Goals: Go home  Family Goals: SHARATH    Progress made toward(s) clinical / shift goals:    Problem: Pain - Standard  Goal: Alleviation of pain or a reduction in pain to the patient’s comfort goal  Description: Target End Date:  Prior to discharge or change in level of care    Document on Vitals flowsheet    1.  Document pain using the appropriate pain scale per order or unit policy  2.  Educate and implement non-pharmacologic comfort measures (i.e. relaxation, distraction, massage, cold/heat therapy, etc.)  3.  Pain management medications as ordered  4.  Reassess pain after pain med administration per policy  5.  If opiods administered assess patient's response to pain medication is appropriate per POSS sedation scale  6.  Follow pain management plan developed in collaboration with patient and interdisciplinary team (including palliative care or pain specialists if applicable)  Outcome: Progressing     Problem: Optimal Care of the Stroke Patient  Goal: Optimal emergency care for the stroke patient  Description: Target End Date:  End of day 1    Time of Onset    1.  Time of last known well obtained  2.  Patient and family/caregiver verbalize understanding of diagnosis, medications and testing  3.  NIHSS performed and documented, including date and time, for ischemic stroke patients prior to any acute recanalization therapy (thrombolytics or mechanical) or within 12 hours of arrival if no intervention is warranted  4.  Consults and referrals placed to appropriate departments    Medications Administration as Ordered:    1.  Implement appropriate reversal agents for INR greater than 1.5  2.  Pre-alteplase administration of antihypertensives for SBP >185 DBP >110  3.  Post-alteplase administration of antihypertensives for SBP >185, DBP >105  4.  Thrombolytic Therapy for qualifying  ischemic stroke patients who arrive within 4.5 hours of time of Last Known Well. Thrombolytic therapy administered within 30 minutes or a documented reason for delay  Outcome: Progressing       Patient is not progressing towards the following goals:      Problem: Psychosocial - Patient Condition  Goal: Patient's ability to re-evaluate and adapt role responsibilities will improve  Description: Target End Date:  Prior to discharge or change in level of care    1.  Assess family support  2.  Encourage support system participation in care  3.  Encouraged verbalization of feelings regarding caregiver responsibilities  4.  Discuss changes in role and responsibilities caused by patient's condition  Outcome: Not Progressing     Problem: Mobility - Stroke  Goal: Patient's capacity to carry out activities will improve  Description: Target End Date:  Prior to discharge or change in level of care    1.  Assess for barriers to mobility/activity  2.  Implement activity per interdisciplinary team recommendations  3.  Target activity level identified and patient/family/caregiver aware of goal  4.  Provide assistive devices  5.  Instruct patient/caregiver on proper use of assistive/adaptive devices  6.  Schedule activities and rest periods to decrease effects of fatigue  7.  Encourage mobilization to extent of ability  8.  Maintain proper body alignment  9.  Provide adequate pain management to allow progressive mobilization  10. Implement pace maker precautions as needed  Outcome: Not Progressing     Problem: Self Care  Goal: Patient will have the ability to perform ADLs independently or with assistance (bathe, groom, dress, toilet and feed)  Description: Target End Date:  Prior to discharge or change in level of care    Document on ADL flowsheet    1.  Assess the capability and level of deficiency to perform ADLs  2.  Encourage family/care giver involvement  3.  Provide assistive devices  4.  Consider PT/OT evaluations  5.   Maintain support, give positive feedback, encourage self-care allowing extra time and verbal cuing as needed  6.  Avoid doing something for patients they can do themselves, but provide assistance as needed  7.  Assist in anticipating/planning individual needs  8.  Collaborate with Case Management and  to meet discharge needs  Outcome: Not Progressing

## 2023-10-01 NOTE — CARE PLAN
The patient is Stable - Low risk of patient condition declining or worsening    Shift Goals  Clinical Goals: Stable Neuro Checks  Patient Goals: Rest, Go home  Family Goals: SHARATH    Progress made toward(s) clinical / shift goals:    Problem: Pain - Standard  Goal: Alleviation of pain or a reduction in pain to the patient’s comfort goal  Outcome: Progressing     Problem: Knowledge Deficit - Stroke Education  Goal: Patient's knowledge of stroke and risk factors will improve  Outcome: Progressing     Problem: Psychosocial - Patient Condition  Goal: Patient's ability to verbalize feelings about condition will improve  Outcome: Progressing  Goal: Patient's ability to re-evaluate and adapt role responsibilities will improve  Outcome: Progressing     Problem: Discharge Planning - Stroke  Goal: Ensure Stroke Core Measures are met prior to discharge  Outcome: Progressing     Problem: Neuro Status  Goal: Neuro status will remain stable or improve  Outcome: Progressing

## 2023-10-01 NOTE — PROGRESS NOTES
"Hospital Medicine Daily Progress Note    Date of Service  10/1/2023    Chief Complaint  Zane Galeano is a 63 y.o. male admitted 9/23/2023 with aphasia    Hospital Course  Zane Galeano is a 62 y.o. male who presented 9/23/2023 with aphasia.  This is a pleasant man who reports a past history of 1 \"massive stroke\" 2 years ago after which she had aphasia which improved, and 3 TIAs with the most recent one 6 months ago.  Also reports a history of A-fib not on any medications and does not give a clear reason for this, as well as diabetes and chronic kidney disease.      He presents due to sudden onset aphasia after waking up from a nap at around 230 yesterday afternoon.  Last known normal was around noon when he went to bed from a nap and woke up and was unable to speak.  Apparently after that they went out for dinner and he choked on a hamburger which is what brought him into the ED.  Patient was able to dislodge the hamburger.      Given his new onset aphasia work-up for stroke was initiated, CT perfusion scan did show a penumbra of 16 mL, and CTA of the head showed M1 occlusions bilaterally with collaterals.  Neurology was consulted, Dr. Guillen, did not recommend any emergent interventions, recommended admission for MRI and speech therapy and general stroke management.  Patient was given aspirin.    Interval Problem Update  Aphasia stable, answered some of his questions concerning [facility], attempting SNF placement for SLP needs if not pt looking into homeless shelters      I have discussed this patient's plan of care and discharge plan at IDT rounds today with Case Management, Nursing, Nursing leadership, and other members of the IDT team.    Consultants/Specialty  Vascular neurology    Code Status  Full Code    Disposition  The patient is not medically cleared for discharge to home or a post-acute facility.  Anticipate discharge to: skilled nursing facility    I have placed the appropriate orders for " post-discharge needs.    Review of Systems  Review of Systems   Constitutional:  Negative for chills and fever.   HENT:  Negative for congestion.    Eyes:  Negative for blurred vision.   Respiratory:  Negative for shortness of breath.    Cardiovascular:  Negative for chest pain and leg swelling.   Gastrointestinal:  Negative for abdominal pain, constipation, diarrhea, nausea and vomiting.   Genitourinary:  Negative for dysuria.   Musculoskeletal:  Negative for myalgias.   Skin:  Negative for rash.   Neurological:  Positive for speech change. Negative for sensory change and focal weakness.        Physical Exam  Temp:  [36.8 °C (98.3 °F)-37 °C (98.6 °F)] 36.8 °C (98.3 °F)  Pulse:  [50-56] 52  Resp:  [16-19] 17  BP: (121-135)/(58-62) 121/62  SpO2:  [93 %-96 %] 96 %    Physical Exam  Constitutional:       Appearance: Normal appearance.   HENT:      Head: Normocephalic and atraumatic.      Nose: Nose normal.      Mouth/Throat:      Mouth: Mucous membranes are moist.      Pharynx: Oropharynx is clear.   Eyes:      Conjunctiva/sclera: Conjunctivae normal.      Pupils: Pupils are equal, round, and reactive to light.   Cardiovascular:      Rate and Rhythm: Normal rate and regular rhythm.      Heart sounds: No murmur heard.  Pulmonary:      Effort: Pulmonary effort is normal.      Breath sounds: No wheezing, rhonchi or rales.   Abdominal:      General: There is no distension.      Palpations: Abdomen is soft.      Tenderness: There is no abdominal tenderness. There is no guarding or rebound.   Musculoskeletal:         General: No swelling or tenderness.      Cervical back: Normal range of motion and neck supple.   Skin:     General: Skin is warm and dry.   Neurological:      Mental Status: He is alert and oriented to person, place, and time.      GCS: GCS eye subscore is 4. GCS verbal subscore is 5. GCS motor subscore is 6.      Cranial Nerves: Dysarthria and facial asymmetry present.   Psychiatric:         Mood and Affect:  Mood normal.         Behavior: Behavior normal.         Fluids    Intake/Output Summary (Last 24 hours) at 10/1/2023 1356  Last data filed at 10/1/2023 0800  Gross per 24 hour   Intake 360 ml   Output --   Net 360 ml         Laboratory                                Imaging  EC-ECHOCARDIOGRAM COMPLETE W/O CONT   Final Result      MR-BRAIN-W/O   Final Result      1.  Small areas of acute infarcts in the left parietal lobe.   2.  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 The blood vessels better evaluated on the recent the CT angiogram.   3.  Chronic infarct in the right temporal and parietal lobes.   4.  Mild chronic microvascular ischemic disease.   5.  Mild cerebral volume loss.      DX-ESOPHAGUS - [id number]   Final Result      CT-CTA NECK WITH & W/O-POST PROCESSING   Final Result      CT angiogram of the neck within normal limits.      CT-CTA HEAD WITH & W/O-POST PROCESS   Final Result      1.  Occlusion of the M1 segments of the middle cerebral artery is noted bilaterally. These occlusions are likely chronic and collaterals are present as described above.      2.  No other occlusion or filling defect identified.      3.  These findings were discussed with DEVON MEJÍA on 09/24/2023.      CT-CEREBRAL PERFUSION ANALYSIS   Final Result      1.  Cerebral blood flow less than 30% likely representing completed infarct = 28 mL.      2.  T Max more than 6 seconds likely representing combination of completed infarct and ischemia = 44 mL.      3.  Mismatched volume likely representing ischemic brain/penumbra = 16 mL      4.  Please note that the cerebral perfusion was performed on the limited brain tissue around the basal ganglia region. Infarct/ischemia outside the CT perfusion sections can be missed in this study.      CT-HEAD W/O   Final Result         NO ACUTE ABNORMALITIES ARE NOTED ON CT SCAN OF THE HEAD.      Findings are consistent with  atrophy.  Decreased attenuation in the periventricular white matter likely indicates microvascular ischemic disease.         DX-CHEST-PORTABLE (1 VIEW)   Final Result         1.  Possible scarring or atelectasis in the left lung base. Aspiration is also possible.      2.  No other infiltrates or consolidations identified.             Assessment/Plan  * Acute CVA (cerebrovascular accident) (HCC)- (present on admission)  Assessment & Plan  With aphemia. Unable to form words, however full comprehension and preserved written language fluency.  History of 1 stroke and 3 TIAs per patient, stroke 2 years ago left him with aphasia that improved back to normal apparently.  MRI showed Small areas of acute infarcts in the left parietal lobe and signs concerning for Moyamoya  Echo showed normal LVEF and RVEF with mild MR  -Goal normotension.   -PT/OT/SLP  -Eliquis and statin  -Neurology consulted, recommend follow up with Neuro-IR within 2-3 weeks for diagnostic angiogram to identify potential grafts for EDAS following this will need follow up with Dr. Emili Allison with [facility] within 4-6 weeks to undergo indirect bypass (EDAS)    Hypertension- (present on admission)  Assessment & Plan  Goal normotension   home amlodipine, added losartan  PRN labetalol, with hydralazine as backup if HR low    AF (atrial fibrillation) (HCC)- (present on admission)  Assessment & Plan  Patient reports history of A-fib.  Not on any medications for this  -Continue Eliquis    Type 2 diabetes mellitus with circulatory disorder, without long-term current use of insulin (HCC)  Assessment & Plan  Hemoglobin A1c 7.5  Insulin basal plus correctional      VTE prophylaxis:    therapeutic anticoagulation with eliquis 5 mg BID      I have performed a physical exam and reviewed and updated ROS and Plan today (10/1/2023). In review of yesterday's note (9/30/2023), there are no changes except as documented above.

## 2023-10-01 NOTE — CARE PLAN
The patient is Stable - Low risk of patient condition declining or worsening    Shift Goals  Clinical Goals: Stable neuro checks  Patient Goals: Leave  Family Goals: SHARATH    Progress made toward(s) clinical / shift goals:    Problem: Optimal Care of the Stroke Patient  Goal: Optimal emergency care for the stroke patient  Description: Target End Date:  End of day 1    Time of Onset    1.  Time of last known well obtained  2.  Patient and family/caregiver verbalize understanding of diagnosis, medications and testing  3.  NIHSS performed and documented, including date and time, for ischemic stroke patients prior to any acute recanalization therapy (thrombolytics or mechanical) or within 12 hours of arrival if no intervention is warranted  4.  Consults and referrals placed to appropriate departments    Medications Administration as Ordered:    1.  Implement appropriate reversal agents for INR greater than 1.5  2.  Pre-alteplase administration of antihypertensives for SBP >185 DBP >110  3.  Post-alteplase administration of antihypertensives for SBP >185, DBP >105  4.  Thrombolytic Therapy for qualifying ischemic stroke patients who arrive within 4.5 hours of time of Last Known Well. Thrombolytic therapy administered within 30 minutes or a documented reason for delay  Outcome: Progressing     Problem: Neuro Status  Goal: Neuro status will remain stable or improve  Description: Target End Date:  Prior to discharge or change in level of care    Document on Neuro assessment in the Assessment flowsheet    1.  Assess and monitor neurologic status per provider order/protocol/unit policy  2.  Assess level of consciousness and orientation  3.  Assess for speech, dysarthria, dysphagia, facial symmetry  4.  Assess visual field, eye movements, gaze preference, pupil reaction and size  5.  Assess muscle strength and motor response in all four extremities  6.  Assess for sensation (numbness and tingling)  7.  Assess basic neuro  reflexes (cough, gag, corneal)  8.  Identify changes in neuro status and report to provider for testing/treatment orders  Outcome: Progressing       Patient is not progressing towards the following goals:

## 2023-10-02 LAB
GLUCOSE BLD STRIP.AUTO-MCNC: 115 MG/DL (ref 65–99)
GLUCOSE BLD STRIP.AUTO-MCNC: 174 MG/DL (ref 65–99)
GLUCOSE BLD STRIP.AUTO-MCNC: 176 MG/DL (ref 65–99)
GLUCOSE BLD STRIP.AUTO-MCNC: 236 MG/DL (ref 65–99)
GLUCOSE BLD STRIP.AUTO-MCNC: 245 MG/DL (ref 65–99)

## 2023-10-02 PROCEDURE — A9270 NON-COVERED ITEM OR SERVICE: HCPCS | Performed by: STUDENT IN AN ORGANIZED HEALTH CARE EDUCATION/TRAINING PROGRAM

## 2023-10-02 PROCEDURE — A9270 NON-COVERED ITEM OR SERVICE: HCPCS | Performed by: HOSPITALIST

## 2023-10-02 PROCEDURE — A9270 NON-COVERED ITEM OR SERVICE: HCPCS

## 2023-10-02 PROCEDURE — 700102 HCHG RX REV CODE 250 W/ 637 OVERRIDE(OP): Performed by: HOSPITALIST

## 2023-10-02 PROCEDURE — 82962 GLUCOSE BLOOD TEST: CPT | Mod: 91

## 2023-10-02 PROCEDURE — 700102 HCHG RX REV CODE 250 W/ 637 OVERRIDE(OP)

## 2023-10-02 PROCEDURE — 86480 TB TEST CELL IMMUN MEASURE: CPT

## 2023-10-02 PROCEDURE — 36415 COLL VENOUS BLD VENIPUNCTURE: CPT

## 2023-10-02 PROCEDURE — 770001 HCHG ROOM/CARE - MED/SURG/GYN PRIV*

## 2023-10-02 PROCEDURE — 99231 SBSQ HOSP IP/OBS SF/LOW 25: CPT | Performed by: STUDENT IN AN ORGANIZED HEALTH CARE EDUCATION/TRAINING PROGRAM

## 2023-10-02 PROCEDURE — 700102 HCHG RX REV CODE 250 W/ 637 OVERRIDE(OP): Performed by: STUDENT IN AN ORGANIZED HEALTH CARE EDUCATION/TRAINING PROGRAM

## 2023-10-02 RX ADMIN — INSULIN LISPRO 2 UNITS: 100 INJECTION, SOLUTION INTRAVENOUS; SUBCUTANEOUS at 07:59

## 2023-10-02 RX ADMIN — INSULIN LISPRO 2 UNITS: 100 INJECTION, SOLUTION INTRAVENOUS; SUBCUTANEOUS at 21:03

## 2023-10-02 RX ADMIN — INSULIN LISPRO 1 UNITS: 100 INJECTION, SOLUTION INTRAVENOUS; SUBCUTANEOUS at 17:22

## 2023-10-02 RX ADMIN — APIXABAN 5 MG: 5 TABLET, FILM COATED ORAL at 17:20

## 2023-10-02 RX ADMIN — AMLODIPINE BESYLATE 10 MG: 5 TABLET ORAL at 04:40

## 2023-10-02 RX ADMIN — LOSARTAN POTASSIUM 50 MG: 50 TABLET, FILM COATED ORAL at 04:41

## 2023-10-02 RX ADMIN — OXYCODONE HYDROCHLORIDE 2.5 MG: 5 TABLET ORAL at 08:21

## 2023-10-02 RX ADMIN — ATORVASTATIN CALCIUM 40 MG: 40 TABLET, FILM COATED ORAL at 17:20

## 2023-10-02 RX ADMIN — APIXABAN 5 MG: 5 TABLET, FILM COATED ORAL at 04:40

## 2023-10-02 RX ADMIN — OXYCODONE HYDROCHLORIDE 5 MG: 5 TABLET ORAL at 12:46

## 2023-10-02 ASSESSMENT — PAIN DESCRIPTION - PAIN TYPE
TYPE: ACUTE PAIN

## 2023-10-02 ASSESSMENT — ENCOUNTER SYMPTOMS
SENSORY CHANGE: 0
NAUSEA: 0
SPEECH CHANGE: 1
FEVER: 0
CONSTIPATION: 0
DIARRHEA: 0
CHILLS: 0
BLURRED VISION: 0
MYALGIAS: 0
ABDOMINAL PAIN: 0
VOMITING: 0
SHORTNESS OF BREATH: 0
FOCAL WEAKNESS: 0

## 2023-10-02 NOTE — CARE PLAN
The patient is Stable - Low risk of patient condition declining or worsening    Shift Goals  Clinical Goals: Stable Neuro Checks  Patient Goals: Rest, Go Home  Family Goals: SHARATH    Progress made toward(s) clinical / shift goals:    Problem: Pain - Standard  Goal: Alleviation of pain or a reduction in pain to the patient’s comfort goal  Outcome: Progressing     Problem: Knowledge Deficit - Stroke Education  Goal: Patient's knowledge of stroke and risk factors will improve  Outcome: Progressing     Problem: Psychosocial - Patient Condition  Goal: Patient's ability to verbalize feelings about condition will improve  Outcome: Progressing     Problem: Neuro Status  Goal: Neuro status will remain stable or improve  Outcome: Progressing

## 2023-10-02 NOTE — DISCHARGE PLANNING
Received choice form @: [id number]  Agency/Facility name: Merged with [facility] referral per choice form @: [phone]

## 2023-10-02 NOTE — CARE PLAN
The patient is Stable - Low risk of patient condition declining or worsening    Shift Goals  Clinical Goals: Stable neuro status  Patient Goals: Go home  Family Goals: SHAARTH    Progress made toward(s) clinical / shift goals:    Problem: Discharge Planning - Stroke  Goal: Ensure Stroke Core Measures are met prior to discharge  Description: Target End Date:  Prior to discharge or change in level of care    1. Patient discharged on antithrombotic therapy (Ischemic Stroke)  2. Patient discharged on intensive statin if LDL is greater than or equal to 70 mg/dl (Ischemic Stroke)  3. Patient discharged on anticoagulation therapy for patients with atrial fibrillation/flutter (Ischemic Stroke)  4. Smoking education/cessation provided if applicable  Outcome: Progressing     Problem: Urinary Elimination  Goal: Establish and maintain regular urinary output  Description: Target End Date:  Prior to discharge or change in level of care    Document on I/O and Assessment flowsheets    1.  Evaluate need to continue indwelling catheter every shift  2.  Assess signs and symptoms of urinary retention  3.  Assess post-void residual volumes  4.  Implement bladder training program  5.  Encourage scheduled voidings  6.  Assist patient to sit on bedside commode or toilet for voiding  7.  Educate patient and family/caregiver on use and purpose of urine collection devices (document in Patient Education)  Outcome: Progressing       Patient is not progressing towards the following goals:

## 2023-10-02 NOTE — DISCHARGE PLANNING
CM called Katharine Brice #[phone] to inquire about status of referral for [facility]; No answer, left voice message requesting callback.      **1201 Hrs - CM met at bedside and updated him on pending acceptance to [facility].   PT recommendation of HHC and FWW discussed with patient who selected [facility] for DME.     CM to f/u with patient for HH choices.        **1255 Hrs - CM received voice message from Emely (#[phone]) with [facility] requesting for referral to be resubmitted as it was not received and that she will complete telephonic assessment either later today or tomorrow.   CM communicated with CM Director/Ena who stated that she resubmitted referral.    Jan Gold order requested from Dr. Dangelo.    DME choice form faxed to [facility].

## 2023-10-02 NOTE — PROGRESS NOTES
"Hospital Medicine Daily Progress Note    Date of Service  10/2/2023    Chief Complaint  Zane Galeano is a 63 y.o. male admitted 9/23/2023 with aphasia    Hospital Course  Zane Galeano is a 62 y.o. male who presented 9/23/2023 with aphasia.  This is a pleasant man who reports a past history of 1 \"massive stroke\" 2 years ago after which she had aphasia which improved, and 3 TIAs with the most recent one 6 months ago.  Also reports a history of A-fib not on any medications and does not give a clear reason for this, as well as diabetes and chronic kidney disease.      He presents due to sudden onset aphasia after waking up from a nap at around 230 yesterday afternoon.  Last known normal was around noon when he went to bed from a nap and woke up and was unable to speak.  Apparently after that they went out for dinner and he choked on a hamburger which is what brought him into the ED.  Patient was able to dislodge the hamburger.      Given his new onset aphasia work-up for stroke was initiated, CT perfusion scan did show a penumbra of 16 mL, and CTA of the head showed M1 occlusions bilaterally with collaterals.  Neurology was consulted, Dr. Guillen, did not recommend any emergent interventions, recommended admission for MRI and speech therapy and general stroke management.  Patient was given aspirin.    Interval Problem Update  Aphasia stable, working on SNF placement for SLP needs if not pt looking into homeless shelters      I have discussed this patient's plan of care and discharge plan at IDT rounds today with Case Management, Nursing, Nursing leadership, and other members of the IDT team.    Consultants/Specialty  Vascular neurology    Code Status  Full Code    Disposition  The patient is medically cleared for discharge to home or a post-acute facility.  Anticipate discharge to: skilled nursing facility    I have placed the appropriate orders for post-discharge needs.    Review of Systems  Review of Systems "   Constitutional:  Negative for chills and fever.   HENT:  Negative for congestion.    Eyes:  Negative for blurred vision.   Respiratory:  Negative for shortness of breath.    Cardiovascular:  Negative for chest pain and leg swelling.   Gastrointestinal:  Negative for abdominal pain, constipation, diarrhea, nausea and vomiting.   Genitourinary:  Negative for dysuria.   Musculoskeletal:  Negative for myalgias.   Skin:  Negative for rash.   Neurological:  Positive for speech change. Negative for sensory change and focal weakness.        Physical Exam  Temp:  [37 °C (98.6 °F)-37.1 °C (98.8 °F)] 37 °C (98.6 °F)  Pulse:  [49-53] 53  Resp:  [16-18] 17  BP: (117-147)/(42-69) 117/57  SpO2:  [94 %-96 %] 95 %    Physical Exam  Constitutional:       Appearance: Normal appearance.   HENT:      Head: Normocephalic and atraumatic.      Nose: Nose normal.      Mouth/Throat:      Mouth: Mucous membranes are moist.      Pharynx: Oropharynx is clear.   Eyes:      Conjunctiva/sclera: Conjunctivae normal.      Pupils: Pupils are equal, round, and reactive to light.   Cardiovascular:      Rate and Rhythm: Normal rate and regular rhythm.      Heart sounds: No murmur heard.  Pulmonary:      Effort: Pulmonary effort is normal.      Breath sounds: No wheezing, rhonchi or rales.   Abdominal:      General: There is no distension.      Palpations: Abdomen is soft.      Tenderness: There is no abdominal tenderness. There is no guarding or rebound.   Musculoskeletal:         General: No swelling or tenderness.      Cervical back: Normal range of motion and neck supple.   Skin:     General: Skin is warm and dry.   Neurological:      Mental Status: He is alert and oriented to person, place, and time.      GCS: GCS eye subscore is 4. GCS verbal subscore is 5. GCS motor subscore is 6.      Cranial Nerves: Dysarthria and facial asymmetry present.   Psychiatric:         Mood and Affect: Mood normal.         Behavior: Behavior normal.          Fluids    Intake/Output Summary (Last 24 hours) at 10/2/2023 1323  Last data filed at 10/2/2023 0821  Gross per 24 hour   Intake 240 ml   Output --   Net 240 ml         Laboratory        Recent Labs     10/01/23  1406   SODIUM 136   POTASSIUM 4.1   CHLORIDE 101   CO2 23   GLUCOSE 240*   BUN 18   CREATININE 1.15   CALCIUM 9.0                         Imaging  EC-ECHOCARDIOGRAM COMPLETE W/O CONT   Final Result      MR-BRAIN-W/O   Final Result      1.  Small areas of acute infarcts in the left parietal lobe.   2.  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 The blood vessels better evaluated on the recent the CT angiogram.   3.  Chronic infarct in the right temporal and parietal lobes.   4.  Mild chronic microvascular ischemic disease.   5.  Mild cerebral volume loss.      DX-ESOPHAGUS - [id number]   Final Result      CT-CTA NECK WITH & W/O-POST PROCESSING   Final Result      CT angiogram of the neck within normal limits.      CT-CTA HEAD WITH & W/O-POST PROCESS   Final Result      1.  Occlusion of the M1 segments of the middle cerebral artery is noted bilaterally. These occlusions are likely chronic and collaterals are present as described above.      2.  No other occlusion or filling defect identified.      3.  These findings were discussed with DEVON MEJÍA on 09/24/2023.      CT-CEREBRAL PERFUSION ANALYSIS   Final Result      1.  Cerebral blood flow less than 30% likely representing completed infarct = 28 mL.      2.  T Max more than 6 seconds likely representing combination of completed infarct and ischemia = 44 mL.      3.  Mismatched volume likely representing ischemic brain/penumbra = 16 mL      4.  Please note that the cerebral perfusion was performed on the limited brain tissue around the basal ganglia region. Infarct/ischemia outside the CT perfusion sections can be missed in this study.      CT-HEAD W/O   Final Result          NO ACUTE ABNORMALITIES ARE NOTED ON CT SCAN OF THE HEAD.      Findings are consistent with atrophy.  Decreased attenuation in the periventricular white matter likely indicates microvascular ischemic disease.         DX-CHEST-PORTABLE (1 VIEW)   Final Result         1.  Possible scarring or atelectasis in the left lung base. Aspiration is also possible.      2.  No other infiltrates or consolidations identified.             Assessment/Plan  * Acute CVA (cerebrovascular accident) (HCC)- (present on admission)  Assessment & Plan  With aphemia. Unable to form words, however full comprehension and preserved written language fluency.  History of 1 stroke and 3 TIAs per patient, stroke 2 years ago left him with aphasia that improved back to normal apparently.  MRI showed Small areas of acute infarcts in the left parietal lobe and signs concerning for Moyamoya  Echo showed normal LVEF and RVEF with mild MR  -Goal normotension.   -PT/OT/SLP  -Eliquis and statin  -Neurology consulted, recommend follow up with Neuro-IR within 2-3 weeks for diagnostic angiogram to identify potential grafts for EDAS following this will need follow up with Dr. Emili Allison with [facility] within 4-6 weeks to undergo indirect bypass (EDAS)    Hypertension- (present on admission)  Assessment & Plan  Goal normotension   home amlodipine, added losartan  PRN labetalol, with hydralazine as backup if HR low    AF (atrial fibrillation) (HCC)- (present on admission)  Assessment & Plan  Patient reports history of A-fib.  Not on any medications for this  -Continue Eliquis    Type 2 diabetes mellitus with circulatory disorder, without long-term current use of insulin (HCC)  Assessment & Plan  Hemoglobin A1c 7.5  Insulin basal plus correctional      VTE prophylaxis:    therapeutic anticoagulation with eliquis 5 mg BID      I have performed a physical exam and reviewed and updated ROS and Plan today (10/2/2023). In review of yesterday's note  (10/1/2023), there are no changes except as documented above.

## 2023-10-03 LAB
GAMMA INTERFERON BACKGROUND BLD IA-ACNC: 0.04 IU/ML
GLUCOSE BLD STRIP.AUTO-MCNC: 117 MG/DL (ref 65–99)
GLUCOSE BLD STRIP.AUTO-MCNC: 125 MG/DL (ref 65–99)
GLUCOSE BLD STRIP.AUTO-MCNC: 155 MG/DL (ref 65–99)
GLUCOSE BLD STRIP.AUTO-MCNC: 190 MG/DL (ref 65–99)
GLUCOSE BLD STRIP.AUTO-MCNC: 228 MG/DL (ref 65–99)
M TB IFN-G BLD-IMP: NEGATIVE
M TB IFN-G CD4+ BCKGRND COR BLD-ACNC: 0 IU/ML
MITOGEN IGNF BCKGRD COR BLD-ACNC: >10 IU/ML
QFT TB2 - NIL TBQ2: 0 IU/ML

## 2023-10-03 PROCEDURE — A9270 NON-COVERED ITEM OR SERVICE: HCPCS | Performed by: HOSPITALIST

## 2023-10-03 PROCEDURE — 770001 HCHG ROOM/CARE - MED/SURG/GYN PRIV*

## 2023-10-03 PROCEDURE — A9270 NON-COVERED ITEM OR SERVICE: HCPCS

## 2023-10-03 PROCEDURE — 99233 SBSQ HOSP IP/OBS HIGH 50: CPT | Performed by: INTERNAL MEDICINE

## 2023-10-03 PROCEDURE — 82962 GLUCOSE BLOOD TEST: CPT | Mod: 91

## 2023-10-03 PROCEDURE — A9270 NON-COVERED ITEM OR SERVICE: HCPCS | Performed by: STUDENT IN AN ORGANIZED HEALTH CARE EDUCATION/TRAINING PROGRAM

## 2023-10-03 PROCEDURE — 700102 HCHG RX REV CODE 250 W/ 637 OVERRIDE(OP)

## 2023-10-03 PROCEDURE — 700102 HCHG RX REV CODE 250 W/ 637 OVERRIDE(OP): Performed by: HOSPITALIST

## 2023-10-03 PROCEDURE — 700102 HCHG RX REV CODE 250 W/ 637 OVERRIDE(OP): Performed by: STUDENT IN AN ORGANIZED HEALTH CARE EDUCATION/TRAINING PROGRAM

## 2023-10-03 RX ADMIN — INSULIN LISPRO 1 UNITS: 100 INJECTION, SOLUTION INTRAVENOUS; SUBCUTANEOUS at 12:57

## 2023-10-03 RX ADMIN — ATORVASTATIN CALCIUM 40 MG: 40 TABLET, FILM COATED ORAL at 17:35

## 2023-10-03 RX ADMIN — OXYCODONE HYDROCHLORIDE 5 MG: 5 TABLET ORAL at 00:40

## 2023-10-03 RX ADMIN — OXYCODONE HYDROCHLORIDE 5 MG: 5 TABLET ORAL at 07:48

## 2023-10-03 RX ADMIN — OXYCODONE HYDROCHLORIDE 5 MG: 5 TABLET ORAL at 17:35

## 2023-10-03 RX ADMIN — APIXABAN 5 MG: 5 TABLET, FILM COATED ORAL at 17:35

## 2023-10-03 RX ADMIN — LOSARTAN POTASSIUM 50 MG: 50 TABLET, FILM COATED ORAL at 04:59

## 2023-10-03 RX ADMIN — APIXABAN 5 MG: 5 TABLET, FILM COATED ORAL at 04:59

## 2023-10-03 RX ADMIN — INSULIN LISPRO 1 UNITS: 100 INJECTION, SOLUTION INTRAVENOUS; SUBCUTANEOUS at 20:40

## 2023-10-03 RX ADMIN — AMLODIPINE BESYLATE 10 MG: 5 TABLET ORAL at 04:59

## 2023-10-03 RX ADMIN — SCOPOLAMINE 1 PATCH: 1.5 PATCH, EXTENDED RELEASE TRANSDERMAL at 02:21

## 2023-10-03 RX ADMIN — INSULIN LISPRO 2 UNITS: 100 INJECTION, SOLUTION INTRAVENOUS; SUBCUTANEOUS at 17:38

## 2023-10-03 ASSESSMENT — ENCOUNTER SYMPTOMS
FOCAL WEAKNESS: 0
NAUSEA: 0
SHORTNESS OF BREATH: 0
VOMITING: 0
SPEECH CHANGE: 1
CONSTIPATION: 0
SENSORY CHANGE: 0
MYALGIAS: 0
ABDOMINAL PAIN: 0
FEVER: 0
DIARRHEA: 0
CHILLS: 0
BLURRED VISION: 0

## 2023-10-03 ASSESSMENT — PAIN DESCRIPTION - PAIN TYPE
TYPE: ACUTE PAIN

## 2023-10-03 NOTE — CARE PLAN
The patient is Stable - Low risk of patient condition declining or worsening    Shift Goals  Clinical Goals: Monitor neuro status, safety  Patient Goals: Rest  Family Goals: SHARATH    Progress made toward(s) clinical / shift goals:     Problem: Neuro Status  Goal: Neuro status will remain stable or improve  Outcome: Progressing  Note: Q4 hour neuro checks in place. Patient is alert and oriented x4, has a left sided facial droop, slurred speech, but 5/5 strength in his extremities. Neuro status remained stable.      Problem: Risk for Aspiration  Goal: Patient's risk for aspiration will be absent or decrease  Outcome: Progressing  Note: Patient is on a level 6 soft and bite sized diet. Ensured head of bed was elevated 30 degrees or higher during meals, encouraged small bites, and assessed for signs and symptoms of aspiration.      Problem: Mobility - Stroke  Goal: Patient's capacity to carry out activities will improve  Outcome: Progressing  Note: Patient is up self to that bathroom. Patient has 5/5 strength in all extremities and is able to turn himself in bed.      Problem: Fall Risk  Goal: Patient will remain free from falls  Outcome: Progressing  Note: Patient was assessed to be a moderate fall risk but he is refusing the bed alarm. Charge RN and provider aware. Bed is in the low position and locked, patient was non-slip socks on, call light is within reach, and patient room is close to the nursing station.        Patient is not progressing towards the following goals: N/A

## 2023-10-03 NOTE — DISCHARGE PLANNING
CM received voice message from Katharine Brice @ [facility] requesting assistance in contacting patient for telephonic assessment.   CM met at bedside with patient, updated him on DCP status, and called assisted patient in placing call to Katharine #[phone]; No answer, left voice message requesting callback to patient's cell #[phone].    Quantiferon Gold ordered on 10/2/2023 & currently pending results.

## 2023-10-04 ENCOUNTER — PHARMACY VISIT (OUTPATIENT)
Dept: PHARMACY | Facility: MEDICAL CENTER | Age: 63
End: 2023-10-04
Payer: COMMERCIAL

## 2023-10-04 VITALS
SYSTOLIC BLOOD PRESSURE: 117 MMHG | HEIGHT: 71 IN | WEIGHT: 141.54 LBS | OXYGEN SATURATION: 96 % | RESPIRATION RATE: 17 BRPM | HEART RATE: 52 BPM | TEMPERATURE: 98.4 F | DIASTOLIC BLOOD PRESSURE: 52 MMHG | BODY MASS INDEX: 19.81 KG/M2

## 2023-10-04 LAB
ALBUMIN SERPL BCP-MCNC: 3.5 G/DL (ref 3.2–4.9)
BUN SERPL-MCNC: 19 MG/DL (ref 8–22)
CALCIUM ALBUM COR SERPL-MCNC: 8.9 MG/DL (ref 8.5–10.5)
CALCIUM SERPL-MCNC: 8.5 MG/DL (ref 8.5–10.5)
CHLORIDE SERPL-SCNC: 104 MMOL/L (ref 96–112)
CO2 SERPL-SCNC: 26 MMOL/L (ref 20–33)
CREAT SERPL-MCNC: 1.29 MG/DL (ref 0.5–1.4)
ERYTHROCYTE [DISTWIDTH] IN BLOOD BY AUTOMATED COUNT: 43.2 FL (ref 35.9–50)
GFR SERPLBLD CREATININE-BSD FMLA CKD-EPI: 62 ML/MIN/1.73 M 2
GLUCOSE BLD STRIP.AUTO-MCNC: 124 MG/DL (ref 65–99)
GLUCOSE BLD STRIP.AUTO-MCNC: 154 MG/DL (ref 65–99)
GLUCOSE BLD STRIP.AUTO-MCNC: 99 MG/DL (ref 65–99)
GLUCOSE SERPL-MCNC: 169 MG/DL (ref 65–99)
HCT VFR BLD AUTO: 30.9 % (ref 42–52)
HGB BLD-MCNC: 9.8 G/DL (ref 14–18)
MCH RBC QN AUTO: 27.7 PG (ref 27–33)
MCHC RBC AUTO-ENTMCNC: 31.7 G/DL (ref 32.3–36.5)
MCV RBC AUTO: 87.3 FL (ref 81.4–97.8)
PHOSPHATE SERPL-MCNC: 4.1 MG/DL (ref 2.5–4.5)
PLATELET # BLD AUTO: 415 K/UL (ref 164–446)
PMV BLD AUTO: 9 FL (ref 9–12.9)
POTASSIUM SERPL-SCNC: 4.2 MMOL/L (ref 3.6–5.5)
RBC # BLD AUTO: 3.54 M/UL (ref 4.7–6.1)
SODIUM SERPL-SCNC: 139 MMOL/L (ref 135–145)
WBC # BLD AUTO: 6.2 K/UL (ref 4.8–10.8)

## 2023-10-04 PROCEDURE — 82962 GLUCOSE BLOOD TEST: CPT | Mod: 91

## 2023-10-04 PROCEDURE — 700102 HCHG RX REV CODE 250 W/ 637 OVERRIDE(OP): Performed by: HOSPITALIST

## 2023-10-04 PROCEDURE — 85027 COMPLETE CBC AUTOMATED: CPT

## 2023-10-04 PROCEDURE — 700102 HCHG RX REV CODE 250 W/ 637 OVERRIDE(OP)

## 2023-10-04 PROCEDURE — RXMED WILLOW AMBULATORY MEDICATION CHARGE: Performed by: INTERNAL MEDICINE

## 2023-10-04 PROCEDURE — 99239 HOSP IP/OBS DSCHRG MGMT >30: CPT | Performed by: INTERNAL MEDICINE

## 2023-10-04 PROCEDURE — A9270 NON-COVERED ITEM OR SERVICE: HCPCS

## 2023-10-04 PROCEDURE — A9270 NON-COVERED ITEM OR SERVICE: HCPCS | Performed by: STUDENT IN AN ORGANIZED HEALTH CARE EDUCATION/TRAINING PROGRAM

## 2023-10-04 PROCEDURE — 80069 RENAL FUNCTION PANEL: CPT

## 2023-10-04 PROCEDURE — 700102 HCHG RX REV CODE 250 W/ 637 OVERRIDE(OP): Performed by: STUDENT IN AN ORGANIZED HEALTH CARE EDUCATION/TRAINING PROGRAM

## 2023-10-04 PROCEDURE — A9270 NON-COVERED ITEM OR SERVICE: HCPCS | Performed by: HOSPITALIST

## 2023-10-04 RX ORDER — LOSARTAN POTASSIUM 50 MG/1
50 TABLET ORAL DAILY
Qty: 30 TABLET | Refills: 0 | Status: SHIPPED | OUTPATIENT
Start: 2023-10-05 | End: 2023-10-22 | Stop reason: SDUPTHER

## 2023-10-04 RX ADMIN — AMLODIPINE BESYLATE 10 MG: 5 TABLET ORAL at 04:48

## 2023-10-04 RX ADMIN — APIXABAN 5 MG: 5 TABLET, FILM COATED ORAL at 04:46

## 2023-10-04 RX ADMIN — OXYCODONE HYDROCHLORIDE 5 MG: 5 TABLET ORAL at 04:46

## 2023-10-04 RX ADMIN — LOSARTAN POTASSIUM 50 MG: 50 TABLET, FILM COATED ORAL at 04:48

## 2023-10-04 ASSESSMENT — PAIN DESCRIPTION - PAIN TYPE
TYPE: ACUTE PAIN
TYPE: ACUTE PAIN

## 2023-10-04 NOTE — CARE PLAN
The patient is Stable - Low risk of patient condition declining or worsening    Shift Goals  Clinical Goals: Monitor neuro status, safety  Patient Goals: Rest  Family Goals: SHARATH    Progress made toward(s) clinical / shift goals:    Problem: Pain - Standard  Goal: Alleviation of pain or a reduction in pain to the patient’s comfort goal  Outcome: Progressing  Note: Pt educated on 0-10 pain scale, educated on both pharmacological and non-pharmacological methods of pain control. Pain controlled with oral medications.      Problem: Knowledge Deficit - Stroke Education  Goal: Patient's knowledge of stroke and risk factors will improve  Outcome: Progressing  Note: Pt verbalizes understanding of plan of care and asks appropriate questions.       Problem: Neuro Status  Goal: Neuro status will remain stable or improve  Outcome: Progressing  Note: Neuro status remains stable.

## 2023-10-04 NOTE — DISCHARGE PLANNING
Case Management Discharge Planning    Admission Date: 9/23/2023  GMLOS: 2.9  ALOS: 10    6-Clicks ADL Score: 19  6-Clicks Mobility Score: 22      Anticipated Discharge Dispo: Discharge Disposition: D/T to home under HHA care in anticipation of covered skilled care (06)  Discharge Address: [id number] [place] [address] [place] [address]    DME Needed: No    Action(s) Taken: OTHER    LMSW called Katharine Yuniel #[phone] to inquire about pt  referral for [facility]. No answer, LMSW left  for call back.     LSW called Emely from the [facility] [place] to inquire about pt referral. Emely stated that Katharine is working on this pt case and will pass on the message to give LSW a call today.     [facility] [address] [place] [address] [place] [address].    LSW assisted pt in calling Katharine from the [facility] [place] to complete admission interview. Pt has been accepted and [facility] will pick pt up at 1630 at the [facility]. LSW sent quantiferon gold results to [email or web address] per Katharine request. Pt is pending HH acceptance with [facility]. LSW will continue to follow up with HH.     Escalations Completed: None    Medically Clear: Yes    Next Steps: LSW will continue to assist with discharge as needed.      Barriers to Discharge: Pending Placement and Transportation

## 2023-10-04 NOTE — PROGRESS NOTES
"Hospital Medicine Daily Progress Note    Date of Service  10/3/2023    Chief Complaint  Zane Galeano is a 63 y.o. male admitted 9/23/2023 with aphasia    Hospital Course  Zane Galeano is a 62 y.o. male who presented 9/23/2023 with aphasia.  This is a pleasant man who reports a past history of 1 \"massive stroke\" 2 years ago after which she had aphasia which improved, and 3 TIAs with the most recent one 6 months ago.  Also reports a history of A-fib not on any medications and does not give a clear reason for this, as well as diabetes and chronic kidney disease.      He presents due to sudden onset aphasia after waking up from a nap at around 230 yesterday afternoon.  Last known normal was around noon when he went to bed from a nap and woke up and was unable to speak.  Apparently after that they went out for dinner and he choked on a hamburger which is what brought him into the ED.  Patient was able to dislodge the hamburger.      Given his new onset aphasia work-up for stroke was initiated, CT perfusion scan did show a penumbra of 16 mL, and CTA of the head showed M1 occlusions bilaterally with collaterals.  Neurology was consulted, Dr. Guillen, did not recommend any emergent interventions, recommended admission for MRI and speech therapy and general stroke management.  Patient was given aspirin.    Interval Problem Update  Aphasia stable, working on SNF placement for SLP needs if not pt looking into homeless shelters\"  10/3.  I reviewed the chart along with vitals, labs, imaging, test (both pending and resulted) and recommendations from specialists and interdisciplinary team.  Stable. Has poor insight, watching show on his laptop  Discused with SW. Planned for [facility], quantiferon pending.      I have discussed this patient's plan of care and discharge plan at IDT rounds today with Case Management, Nursing, Nursing leadership, and other members of the IDT " team.    Consultants/Specialty  Vascular neurology    Code Status  Full Code    Disposition  The patient is not medically cleared for discharge to home or a post-acute facility.      I have placed the appropriate orders for post-discharge needs.    Review of Systems  Review of Systems   Constitutional:  Negative for chills and fever.   HENT:  Negative for congestion.    Eyes:  Negative for blurred vision.   Respiratory:  Negative for shortness of breath.    Cardiovascular:  Negative for chest pain and leg swelling.   Gastrointestinal:  Negative for abdominal pain, constipation, diarrhea, nausea and vomiting.   Genitourinary:  Negative for dysuria.   Musculoskeletal:  Negative for myalgias.   Skin:  Negative for rash.   Neurological:  Positive for speech change. Negative for sensory change and focal weakness.        Physical Exam  Temp:  [36.4 °C (97.5 °F)-37 °C (98.6 °F)] 37 °C (98.6 °F)  Pulse:  [48-57] 48  Resp:  [16-18] 16  BP: (119-144)/(55-80) 144/80  SpO2:  [94 %-98 %] 98 %    Physical Exam  Constitutional:       Appearance: Normal appearance.      Comments: Looks discheveled   HENT:      Head: Normocephalic and atraumatic.      Comments: Poor dentition     Nose: Nose normal.      Mouth/Throat:      Mouth: Mucous membranes are moist.      Pharynx: Oropharynx is clear.   Eyes:      Conjunctiva/sclera: Conjunctivae normal.      Pupils: Pupils are equal, round, and reactive to light.   Cardiovascular:      Rate and Rhythm: Normal rate and regular rhythm.      Heart sounds: No murmur heard.  Pulmonary:      Effort: Pulmonary effort is normal.      Breath sounds: No wheezing, rhonchi or rales.   Abdominal:      General: There is no distension.      Palpations: Abdomen is soft.      Tenderness: There is no abdominal tenderness. There is no guarding or rebound.   Musculoskeletal:         General: No swelling or tenderness.      Cervical back: Normal range of motion and neck supple.   Skin:     General: Skin is warm  and dry.   Neurological:      Mental Status: He is alert and oriented to person, place, and time.      GCS: GCS eye subscore is 4. GCS verbal subscore is 5. GCS motor subscore is 6.      Cranial Nerves: Dysarthria and facial asymmetry present.      Motor: Weakness (generalized) present.   Psychiatric:         Mood and Affect: Mood normal.         Behavior: Behavior normal.         Fluids    Intake/Output Summary (Last 24 hours) at 10/3/2023 1909  Last data filed at 10/3/2023 1602  Gross per 24 hour   Intake 100 ml   Output 50 ml   Net 50 ml         Laboratory        Recent Labs     10/01/23  1406   SODIUM 136   POTASSIUM 4.1   CHLORIDE 101   CO2 23   GLUCOSE 240*   BUN 18   CREATININE 1.15   CALCIUM 9.0                         Imaging  EC-ECHOCARDIOGRAM COMPLETE W/O CONT   Final Result      MR-BRAIN-W/O   Final Result      1.  Small areas of acute infarcts in the left parietal lobe.   2.  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 The blood vessels better evaluated on the recent the CT angiogram.   3.  Chronic infarct in the right temporal and parietal lobes.   4.  Mild chronic microvascular ischemic disease.   5.  Mild cerebral volume loss.      DX-ESOPHAGUS - [id number]   Final Result      CT-CTA NECK WITH & W/O-POST PROCESSING   Final Result      CT angiogram of the neck within normal limits.      CT-CTA HEAD WITH & W/O-POST PROCESS   Final Result      1.  Occlusion of the M1 segments of the middle cerebral artery is noted bilaterally. These occlusions are likely chronic and collaterals are present as described above.      2.  No other occlusion or filling defect identified.      3.  These findings were discussed with DEVON MEJÍA on 09/24/2023.      CT-CEREBRAL PERFUSION ANALYSIS   Final Result      1.  Cerebral blood flow less than 30% likely representing completed infarct = 28 mL.      2.  T Max more than 6 seconds likely representing  "combination of completed infarct and ischemia = 44 mL.      3.  Mismatched volume likely representing ischemic brain/penumbra = 16 mL      4.  Please note that the cerebral perfusion was performed on the limited brain tissue around the basal ganglia region. Infarct/ischemia outside the CT perfusion sections can be missed in this study.      CT-HEAD W/O   Final Result         NO ACUTE ABNORMALITIES ARE NOTED ON CT SCAN OF THE HEAD.      Findings are consistent with atrophy.  Decreased attenuation in the periventricular white matter likely indicates microvascular ischemic disease.         DX-CHEST-PORTABLE (1 VIEW)   Final Result         1.  Possible scarring or atelectasis in the left lung base. Aspiration is also possible.      2.  No other infiltrates or consolidations identified.             Assessment/Plan  * Acute CVA (cerebrovascular accident) (HCC)- (present on admission)  Assessment & Plan  With aphemia. Unable to form words, however full comprehension and preserved written language fluency.  History of 1 stroke and 3 TIAs per patient, stroke 2 years ago left him with aphasia that improved back to normal apparently.  MRI showed Small areas of acute infarcts in the left parietal lobe and signs concerning for Moyamoya  Echo showed normal LVEF and RVEF with mild MR  -Goal normotension.   -PT/OT/SLP  -Eliquis and statin  -Neurology consulted, recommend follow up with Neuro-IR within 2-3 weeks for diagnostic angiogram to identify potential grafts for EDAS following this will need follow up with Dr. Emili Allison with [facility] within 4-6 weeks to undergo indirect bypass (EDAS)\"    Qunatiferon pending  D/c to recuperative home.    Hypertension- (present on admission)  Assessment & Plan  Goal normotension   home amlodipine, added losartan  PRN labetalol, with hydralazine as backup if HR low\"    Vitals:    10/03/23 1735   BP:    Pulse:    Resp: 16   Temp:    SpO2:      BP stable    AF (atrial fibrillation) " "(HCC)- (present on admission)  Assessment & Plan  Patient reports history of A-fib.  Not on any medications for this  -Continue Eliquis\"    Vitals:    10/03/23 1735   BP:    Pulse:    Resp: 16   Temp:    SpO2:      HR stable.  Monitor renal function, bleeding and melena as patient on Eliquis    Type 2 diabetes mellitus with circulatory disorder, without long-term current use of insulin ([facility])  Assessment & Plan  Hemoglobin A1c 7.5  Insulin basal plus correctional      VTE prophylaxis: Eliquis  I have performed a physical exam and reviewed and updated ROS and Plan today (10/3/2023). In review of yesterday's note (10/2/2023), there are no changes except as documented above.        "

## 2023-10-04 NOTE — DISCHARGE INSTRUCTIONS
Discharge Instructions per Waqar Garcia M.D.  DIET: Resume previous diet  Healthy diet. Plenty of vegetables.  ACTIVITY: Avoid strenuous activity or heavy lifting. As per [facility]  DIAGNOSIS: Principal Problem:    Acute CVA (cerebrovascular accident) (HCC) (POA: Yes)  Active Problems:    Type 2 diabetes mellitus with circulatory disorder, without long-term current use of insulin (HCC) (POA: Unknown)    AF (atrial fibrillation) (HCC) (POA: Yes)    Hypertension (POA: Yes)      Follow up instructions.  Please call [phone] to schedule and/or confirm appointments([phone] for medical group, [phone] for imaging, [phone] for specialty); we will do our part to try calling as well.  Assign and follow up with primary provider or discharge clinic physician in 1 week  Follow up with Neurology or stroke clinic in 1 week for stroke.  Follow up with [facility] based neurology interventional radiologist for EDAS, Dr. Shawn Mak, [facility] in 1-2 weeks (recommendation from Neurology:  Needs outpatient follow up with [facility]-based Neuro-IR for diagnostic angiogram to identify potential grafts for EDAS.  This should be completed in the 2-3 weeks timeframe, following catheter angiogram, will need follow up with [facility]-based NSG to undergo indirect bypass (EDAS)- this follow up should be completed in the 4-6 week timeframe (Consider Dr. Shawn Mak with [facility])).  Follow up with your cardiologist for Afib in 1 week  NURSING provide resources/pamphlets for  Stroke, Afib/anticoag (monitor heart rate, bleeding/melena and renal function with provider), hypertension (Check and record blood pressures at home at least twice a day for primary provider to review)  Return to ER in the event of new or worsening symptoms. Please note importance of compliance and the patient has agreed to proceed with all medical recommendations and follow up plan indicated above. All medications come with benefits and risks. Risks may include permanent injury or death and these risks  can be minimized with close reassessment and monitoring. Please make it to your scheduled follow ups with Pcp , and/or specialists clinic.

## 2023-10-04 NOTE — DISCHARGE SUMMARY
"Discharge Summary    CHIEF COMPLAINT ON ADMISSION  Chief Complaint   Patient presents with    Difficulty Breathing     PT BIBA for a partial airway obstruction. Patient was eating at home and exerienced choking on a hamburger. Patient was able to dislodge some of the obstruction on EMS arrival. Patient dislodged what he felt was the rest of it on arrival to ED.        Reason for Admission  EMS     Admission Date  9/23/2023    CODE STATUS  Full Code    HPI & HOSPITAL COURSE  This is a 63 y.o. male here with Difficulty Breathing (PT BIBA for a partial airway obstruction. Patient was eating at home and exerienced choking on a hamburger. Patient was able to dislodge some of the obstruction on EMS arrival. Patient dislodged what he felt was the rest of it on arrival to ED. )  Please review Dr. José Barrett M.D. notes for further details of history of present illness, past medical/social/family histories, allergies and medications. Please review Dr. Guillen, Neurology consultation notes.  He is homeless?, He has a history of \"massive stroke\" 2y ago with aphasia, repoted/vague history of Afib? Not on medications, diabetes and CKD. He presented with acute onset aphasia the afternoon of discharge date. He apparently still ate dinner and choked on a hamburger. Help was called and he was to go to the ED luckily hamburger was dislodged.   At the ED, he is afebrile, hemodynamically stable.   CT perfusion scan did show a penumbra of 16 mL, and CTA of the head showed M1 occlusions bilaterally with collaterals.  Neurology consulted. Dr. Guillen, did not recommend any emergent interventions. MRI showed:  1.  Small areas of acute infarcts in the left parietal lobe.  2.  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 The blood vessels better evaluated on the recent the CT angiogram.  3.  Chronic infarct in the right temporal and parietal lobes.  4.  Mild " "chronic microvascular ischemic disease.  5.  Mild cerebral volume loss.  Echo normal EF. Given the history of Afib, he was put on Eliquis. He is on high dose statin. Neuroology also gave the following recommendation:  Needs outpatient follow up with [facility]-based Neuro-IR for diagnostic angiogram to identify potential grafts for EDAS.  This should be completed in the 2-3 weeks timeframe. Following catheter angiogram, will need follow up with [facility]-based NSG to undergo indirect bypass (EDAS)- this follow up should be completed in the 4-6 week timeframe (Consider Dr. Shawn Mak with [facility])\" Rehab, PT and OT felt he can go home with [facility]. He was discharged to recuperative house with [facility].    His blood sugars, heart rate and blood pressures are stable.     At discharge date, Zane Gaelano afebrile and hemodynamically stable.  Zane Galeano wanted to be discharged today.      Imaging  EC-ECHOCARDIOGRAM COMPLETE W/O CONT   Final Result      MR-BRAIN-W/O   Final Result      1.  Small areas of acute infarcts in the left parietal lobe.   2.  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 The blood vessels better evaluated on the recent the CT angiogram.   3.  Chronic infarct in the right temporal and parietal lobes.   4.  Mild chronic microvascular ischemic disease.   5.  Mild cerebral volume loss.      DX-ESOPHAGUS - [id number]   Final Result      CT-CTA NECK WITH & W/O-POST PROCESSING   Final Result      CT angiogram of the neck within normal limits.      CT-CTA HEAD WITH & W/O-POST PROCESS   Final Result      1.  Occlusion of the M1 segments of the middle cerebral artery is noted bilaterally. These occlusions are likely chronic and collaterals are present as described above.      2.  No other occlusion or filling defect identified.      3.  These findings were discussed with DEVON MEJÍA on 09/24/2023.      CT-CEREBRAL PERFUSION ANALYSIS   Final Result      1.  " Cerebral blood flow less than 30% likely representing completed infarct = 28 mL.      2.  T Max more than 6 seconds likely representing combination of completed infarct and ischemia = 44 mL.      3.  Mismatched volume likely representing ischemic brain/penumbra = 16 mL      4.  Please note that the cerebral perfusion was performed on the limited brain tissue around the basal ganglia region. Infarct/ischemia outside the CT perfusion sections can be missed in this study.      CT-HEAD W/O   Final Result         NO ACUTE ABNORMALITIES ARE NOTED ON CT SCAN OF THE HEAD.      Findings are consistent with atrophy.  Decreased attenuation in the periventricular white matter likely indicates microvascular ischemic disease.         DX-CHEST-PORTABLE (1 VIEW)   Final Result         1.  Possible scarring or atelectasis in the left lung base. Aspiration is also possible.      2.  No other infiltrates or consolidations identified.              Therefore, he is discharged in fair and stable condition to home with organized home healthcare and close outpatient follow-up.    The patient met 2-midnight criteria for an inpatient stay at the time of discharge.    Discharge Date  10/4/2023    FOLLOW UP ITEMS POST DISCHARGE      DISCHARGE DIAGNOSES  Principal Problem:    Acute CVA (cerebrovascular accident) (HCC) (POA: Yes)  Active Problems:    Type 2 diabetes mellitus with circulatory disorder, without long-term current use of insulin (HCC) (POA: Unknown)    AF (atrial fibrillation) (HCC) (POA: Yes)    Hypertension (POA: Yes)        FOLLOW UP  No future appointments.  No follow-up provider specified.  Assign and follow up with primary provider or discharge clinic physician in 1 week  Follow up with Neurology or stroke clinic in 1 week for stroke.  Follow up with [place] based neurology interventional radiologist for EDAS, [facility] in 1-2 weeks (recommendation from Neurology:  Needs outpatient follow up with [place]-based  Neuro-IR for diagnostic angiogram to identify potential grafts for EDAS.  This should be completed in the 2-3 weeks timeframe, following catheter angiogram, will need follow up with [facility]-based NSG to undergo indirect bypass (EDAS)- this follow up should be completed in the 4-6 week timeframe (Consider Dr. Shawn Mak with [facility])).  Follow up with your cardiologist for Afib in 1 week  NURSING provide resources/pamphlets for  Stroke, Afib/anticoag (monitor heart rate, bleeding/melena and renal function with provider), hypertension (Check and record blood pressures at home at least twice a day for primary provider to review)  MEDICATIONS ON DISCHARGE     Medication List        START taking these medications        Instructions   Eliquis 5mg Tabs  Generic drug: apixaban   Take 1 Tablet by mouth 2 times a day. Indications: Thromboembolism secondary to Atrial Fibrillation  Dose: 5 mg     losartan 50 MG Tabs  Start taking on: October 5, 2023  Commonly known as: Cozaar   Take 1 Tablet by mouth every day.  Dose: 50 mg            CONTINUE taking these medications        Instructions   amLODIPine 5 MG Tabs  Commonly known as: Norvasc   Take 5 mg by mouth every day.  Dose: 5 mg     atorvastatin 40 MG Tabs  Commonly known as: Lipitor   Take 40 mg by mouth every day.  Dose: 40 mg     Lantus 100 UNIT/ML Soln  Generic drug: insulin glargine   Inject 25 Units under the skin every morning.  Dose: 25 Units     metformin 1000 MG tablet  Commonly known as: Glucophage   Take 1,000 mg by mouth 2 times a day with meals.  Dose: 1,000 mg            STOP taking these medications      acetaminophen 500 MG Tabs  Commonly known as: Tylenol     aspirin  MG Tbec  Commonly known as: Ecotrin     baclofen 10 MG Tabs  Commonly known as: Lioresal     bethanechol 10 MG Tabs  Commonly known as: Urecholine              Allergies  Allergies   Allergen Reactions    Vancomycin Unspecified     Suspected induced nephropathy        DIET  Orders Placed This  Encounter   Procedures    Diet Order Diet: Level 6 - Soft and Bite Sized (Meds ok floated whole w/ puree); Liquid level: Level 0 - Thin; Second Modifier: (optional): Consistent CHO (Diabetic)     Standing Status:   Standing     Number of Occurrences:   1     Order Specific Question:   Diet:     Answer:   Level 6 - Soft and Bite Sized [23]     Comments:   Meds ok floated whole w/ puree     Order Specific Question:   Liquid level     Answer:   Level 0 - Thin     Order Specific Question:   Second Modifier: (optional)     Answer:   Consistent CHO (Diabetic) [4]       ACTIVITY  Avoid heavy lifting or strenuous activity      CONSULTATIONS  Neurology    PROCEDURES      LABORATORY  Lab Results   Component Value Date    SODIUM 139 10/04/2023    POTASSIUM 4.2 10/04/2023    CHLORIDE 104 10/04/2023    CO2 26 10/04/2023    GLUCOSE 169 (H) 10/04/2023    BUN 19 10/04/2023    CREATININE 1.29 10/04/2023        Lab Results   Component Value Date    WBC 6.2 10/04/2023    HEMOGLOBIN 9.8 (L) 10/04/2023    HEMATOCRIT 30.9 (L) 10/04/2023    PLATELETCT 415 10/04/2023        Total time of the discharge process exceeds 45 minutes.

## 2023-10-04 NOTE — CARE PLAN
The patient is Stable - Low risk of patient condition declining or worsening    Shift Goals  Clinical Goals: Monitor neuro status, safety  Patient Goals: Sleep  Family Goals: SHARATH    Progress made toward(s) clinical / shift goals:  Patient is alert and oriented. Rested. No acute change noted .    Patient is not progressing towards the following goals:

## 2023-10-12 NOTE — DOCUMENTATION QUERY
"                                                                         [facility]                                                                       Query Response Note      PATIENT:               ELOISA FLAHERTY  ACCT #:                  [id number]  MRN:                     [id number]  :                      1960  ADMIT DATE:       2023 10:34 PM  DISCH DATE:        10/4/2023 4:18 PM  RESPONDING  PROVIDER #:        [id number]           QUERY TEXT:    Anemia is documented in the Medical Record. Please specify the underlying cause of the anemia.    The patient's Clinical Indicators include:  62yo with dx of CVA, afib     [facility] Consult \"Patient's hospital course has been notable for hyperglycemia and ABLA.\"     HGB 9.3   HGB 9.6    Risk factors: age, homelessness, CVA, atrial fibrillation, CKD4  Treatments: labwork, monitoring    Contact me with questions.     Thank you,  Afshan Cunha, MADELYNP, CDI  ann [email or web address]  Options provided:   -- Blood loss anemia, acute   -- Blood loss anemia, chronic   -- Due to/in/with chronic kidney disease   -- Other explanation:other explanation-, please specify   -- Unable to determine      Query created by: Afshan Cunha on 10/3/2023 3:55 AM    RESPONSE TEXT:    Due to/in/with chronic kidney disease          Electronically signed by:  SAGE BERKOWITZ MD 10/11/2023 5:46 PM              "

## 2023-10-22 ENCOUNTER — PHARMACY VISIT (OUTPATIENT)
Dept: PHARMACY | Facility: MEDICAL CENTER | Age: 63
End: 2023-10-22
Payer: COMMERCIAL

## 2023-10-22 ENCOUNTER — APPOINTMENT (OUTPATIENT)
Dept: RADIOLOGY | Facility: MEDICAL CENTER | Age: 63
End: 2023-10-22
Attending: EMERGENCY MEDICINE
Payer: MEDICARE

## 2023-10-22 ENCOUNTER — HOSPITAL ENCOUNTER (EMERGENCY)
Facility: MEDICAL CENTER | Age: 63
End: 2023-10-22
Attending: EMERGENCY MEDICINE
Payer: MEDICARE

## 2023-10-22 VITALS
SYSTOLIC BLOOD PRESSURE: 154 MMHG | RESPIRATION RATE: 17 BRPM | TEMPERATURE: 98.4 F | BODY MASS INDEX: 20.89 KG/M2 | WEIGHT: 168 LBS | HEART RATE: 63 BPM | DIASTOLIC BLOOD PRESSURE: 71 MMHG | HEIGHT: 75 IN | OXYGEN SATURATION: 97 %

## 2023-10-22 DIAGNOSIS — I10 PRIMARY HYPERTENSION: ICD-10-CM

## 2023-10-22 DIAGNOSIS — I48.91 ATRIAL FIBRILLATION, UNSPECIFIED TYPE (HCC): ICD-10-CM

## 2023-10-22 DIAGNOSIS — Z76.0 MEDICATION REFILL: ICD-10-CM

## 2023-10-22 DIAGNOSIS — R20.2 PARESTHESIAS: ICD-10-CM

## 2023-10-22 LAB
ABO + RH BLD: NORMAL
ABO GROUP BLD: NORMAL
ALBUMIN SERPL BCP-MCNC: 4.3 G/DL (ref 3.2–4.9)
ALBUMIN/GLOB SERPL: 1.1 G/DL
ALP SERPL-CCNC: 92 U/L (ref 30–99)
ALT SERPL-CCNC: 12 U/L (ref 2–50)
ANION GAP SERPL CALC-SCNC: 15 MMOL/L (ref 7–16)
APTT PPP: 33.7 SEC (ref 24.7–36)
AST SERPL-CCNC: 17 U/L (ref 12–45)
BASOPHILS # BLD AUTO: 0.8 % (ref 0–1.8)
BASOPHILS # BLD: 0.06 K/UL (ref 0–0.12)
BILIRUB SERPL-MCNC: 0.2 MG/DL (ref 0.1–1.5)
BLD GP AB SCN SERPL QL: NORMAL
BUN SERPL-MCNC: 26 MG/DL (ref 8–22)
CALCIUM ALBUM COR SERPL-MCNC: 9.1 MG/DL (ref 8.5–10.5)
CALCIUM SERPL-MCNC: 9.3 MG/DL (ref 8.5–10.5)
CHLORIDE SERPL-SCNC: 104 MMOL/L (ref 96–112)
CO2 SERPL-SCNC: 22 MMOL/L (ref 20–33)
CREAT SERPL-MCNC: 1.27 MG/DL (ref 0.5–1.4)
EKG IMPRESSION: NORMAL
EOSINOPHIL # BLD AUTO: 0.22 K/UL (ref 0–0.51)
EOSINOPHIL NFR BLD: 2.9 % (ref 0–6.9)
ERYTHROCYTE [DISTWIDTH] IN BLOOD BY AUTOMATED COUNT: 46 FL (ref 35.9–50)
GFR SERPLBLD CREATININE-BSD FMLA CKD-EPI: 63 ML/MIN/1.73 M 2
GLOBULIN SER CALC-MCNC: 4 G/DL (ref 1.9–3.5)
GLUCOSE SERPL-MCNC: 155 MG/DL (ref 65–99)
HCT VFR BLD AUTO: 38 % (ref 42–52)
HGB BLD-MCNC: 11.8 G/DL (ref 14–18)
IMM GRANULOCYTES # BLD AUTO: 0.04 K/UL (ref 0–0.11)
IMM GRANULOCYTES NFR BLD AUTO: 0.5 % (ref 0–0.9)
INR PPP: 1.33 (ref 0.87–1.13)
LYMPHOCYTES # BLD AUTO: 1.67 K/UL (ref 1–4.8)
LYMPHOCYTES NFR BLD: 22.1 % (ref 22–41)
MCH RBC QN AUTO: 27.3 PG (ref 27–33)
MCHC RBC AUTO-ENTMCNC: 31.1 G/DL (ref 32.3–36.5)
MCV RBC AUTO: 87.8 FL (ref 81.4–97.8)
MONOCYTES # BLD AUTO: 0.36 K/UL (ref 0–0.85)
MONOCYTES NFR BLD AUTO: 4.8 % (ref 0–13.4)
NEUTROPHILS # BLD AUTO: 5.19 K/UL (ref 1.82–7.42)
NEUTROPHILS NFR BLD: 68.9 % (ref 44–72)
NRBC # BLD AUTO: 0 K/UL
NRBC BLD-RTO: 0 /100 WBC (ref 0–0.2)
PLATELET # BLD AUTO: 263 K/UL (ref 164–446)
PMV BLD AUTO: 9.7 FL (ref 9–12.9)
POTASSIUM SERPL-SCNC: 4.2 MMOL/L (ref 3.6–5.5)
PROCALCITONIN SERPL-MCNC: 0.15 NG/ML
PROT SERPL-MCNC: 8.3 G/DL (ref 6–8.2)
PROTHROMBIN TIME: 16.6 SEC (ref 12–14.6)
RBC # BLD AUTO: 4.33 M/UL (ref 4.7–6.1)
RH BLD: NORMAL
SODIUM SERPL-SCNC: 141 MMOL/L (ref 135–145)
TROPONIN T SERPL-MCNC: 30 NG/L (ref 6–19)
TROPONIN T SERPL-MCNC: 30 NG/L (ref 6–19)
WBC # BLD AUTO: 7.5 K/UL (ref 4.8–10.8)

## 2023-10-22 PROCEDURE — 700117 HCHG RX CONTRAST REV CODE 255: Performed by: EMERGENCY MEDICINE

## 2023-10-22 PROCEDURE — 70498 CT ANGIOGRAPHY NECK: CPT

## 2023-10-22 PROCEDURE — 84484 ASSAY OF TROPONIN QUANT: CPT

## 2023-10-22 PROCEDURE — 93005 ELECTROCARDIOGRAM TRACING: CPT | Performed by: EMERGENCY MEDICINE

## 2023-10-22 PROCEDURE — 99284 EMERGENCY DEPT VISIT MOD MDM: CPT

## 2023-10-22 PROCEDURE — 71045 X-RAY EXAM CHEST 1 VIEW: CPT

## 2023-10-22 PROCEDURE — 86901 BLOOD TYPING SEROLOGIC RH(D): CPT

## 2023-10-22 PROCEDURE — 85730 THROMBOPLASTIN TIME PARTIAL: CPT

## 2023-10-22 PROCEDURE — 70496 CT ANGIOGRAPHY HEAD: CPT

## 2023-10-22 PROCEDURE — 86900 BLOOD TYPING SEROLOGIC ABO: CPT

## 2023-10-22 PROCEDURE — 84145 PROCALCITONIN (PCT): CPT

## 2023-10-22 PROCEDURE — 85025 COMPLETE CBC W/AUTO DIFF WBC: CPT

## 2023-10-22 PROCEDURE — 0042T CT-CEREBRAL PERFUSION ANALYSIS: CPT

## 2023-10-22 PROCEDURE — 86850 RBC ANTIBODY SCREEN: CPT

## 2023-10-22 PROCEDURE — 36415 COLL VENOUS BLD VENIPUNCTURE: CPT

## 2023-10-22 PROCEDURE — 80053 COMPREHEN METABOLIC PANEL: CPT

## 2023-10-22 PROCEDURE — 85610 PROTHROMBIN TIME: CPT

## 2023-10-22 PROCEDURE — 70450 CT HEAD/BRAIN W/O DYE: CPT

## 2023-10-22 PROCEDURE — RXMED WILLOW AMBULATORY MEDICATION CHARGE: Performed by: EMERGENCY MEDICINE

## 2023-10-22 RX ORDER — INSULIN GLARGINE 100 [IU]/ML
25 INJECTION, SOLUTION SUBCUTANEOUS EVERY MORNING
Qty: 15 ML | Refills: 0 | Status: ACTIVE | OUTPATIENT
Start: 2023-10-22

## 2023-10-22 RX ORDER — AMLODIPINE BESYLATE 5 MG/1
5 TABLET ORAL DAILY
Qty: 30 TABLET | Refills: 0 | Status: SHIPPED | OUTPATIENT
Start: 2023-10-22

## 2023-10-22 RX ORDER — ATORVASTATIN CALCIUM 40 MG/1
40 TABLET, FILM COATED ORAL DAILY
Qty: 30 TABLET | Refills: 0 | Status: SHIPPED | OUTPATIENT
Start: 2023-10-22

## 2023-10-22 RX ORDER — INSULIN GLARGINE 100 [IU]/ML
25 INJECTION, SOLUTION SUBCUTANEOUS EVERY MORNING
Qty: 15 ML | Refills: 2 | Status: ACTIVE | OUTPATIENT
Start: 2023-10-22

## 2023-10-22 RX ORDER — LOSARTAN POTASSIUM 50 MG/1
50 TABLET ORAL DAILY
Qty: 30 TABLET | Refills: 0 | Status: SHIPPED | OUTPATIENT
Start: 2023-10-22

## 2023-10-22 RX ADMIN — IOHEXOL 80 ML: 350 INJECTION, SOLUTION INTRAVENOUS at 08:28

## 2023-10-22 RX ADMIN — IOHEXOL 40 ML: 350 INJECTION, SOLUTION INTRAVENOUS at 08:26

## 2023-10-22 ASSESSMENT — FIBROSIS 4 INDEX: FIB4 SCORE: 0.27

## 2023-10-22 NOTE — ED NOTES
Assist RN:    PIV inserted using US machine. Pt tolerated well.  PIV is flushing without resistance and has blood draw back.

## 2023-10-22 NOTE — ED NOTES
Discharge instructions given.  All questions answered.  Prescriptions given x6, provided to pt.  Pt to follow-up with [facility], return to ER if symptoms worsen as discussed.  Pt verbalized understanding.  All belongings with pt.  Pt ambulated to [facility].

## 2023-10-22 NOTE — ED PROVIDER NOTES
ED Provider Note    Scribed for Ariane Jimenez M.D. by Andrea Quintanilla. 10/22/2023, 7:12 AM.    Primary care provider: Pcp Pt States None  Means of arrival: EMS  History obtained from: Patient and EMS  History limited by: None    CHIEF COMPLAINT  Chief Complaint   Patient presents with    Other     Patient complaining of a sensation to the right side of head. Patient states that he felt this sensation before his last stroke. Patient has hx of 2 strokes in the past.       HPI/ROS  Zane Galeano is a 63 y.o. male who presents to the Emergency Department via EMS for evaluation of a possible stroke onset earlier today. Patient states that he felt normal last night before he went to bed at around 8 PM. This morning he felt some pain to and paresthesias to the right side of his head.   Patient had a stroke in July 2021 and another one in September 2023. He has baseline right-sided facial droop and aphasia from his strokes. He reports current similar symptoms to his strokes in the past, concerning him and prompting him to present to the ED today.  Denies any nail speech abnormalities. He notes compliancy with all of his medication.  However he is almost out of his medications and is requesting a medication refill.  Drug allergies to Vancomycin.     EXTERNAL RECORDS REVIEWED  Records show that the patient was last on 9/23/23 for difficulty breathing secondary to a choking episode. During that hospitalization, patient was noted to have aphasia and had a stroke work up done. MRI brain on 9/24/23 which showed acute infarcts in the left parietal lobe and chronic infract in the right temporal and parietal lobes. He had an echocardiogram 9/25/23, which was unremarkable.  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    LIMITATION TO HISTORY   Select: : None    OUTSIDE HISTORIAN(S):  EMS , who was able to help contribute to the patient's  "history      PAST MEDICAL HISTORY   History of a stroke in July 2021 and September 2023.    SURGICAL HISTORY  patient denies any surgical history    SOCIAL HISTORY  None noted    FAMILY HISTORY  No pertinent family history    CURRENT MEDICATIONS  Current Outpatient Medications   Medication Instructions    amLODIPine (NORVASC) 5 mg, Oral, DAILY    atorvastatin (LIPITOR) 40 mg, Oral, DAILY    Eliquis 5 mg, Oral, 2 TIMES DAILY    insulin glargine (LANTUS) 25 Units, Subcutaneous, EVERY MORNING    losartan (COZAAR) 50 mg, Oral, DAILY    metformin (GLUCOPHAGE) 1,000 mg, Oral, 2 TIMES DAILY WITH MEALS        ALLERGIES  Allergies   Allergen Reactions    Vancomycin Unspecified     Suspected induced nephropathy        PHYSICAL EXAM  VITAL SIGNS: BP (!) 191/88   Pulse 63   Temp 36.4 °C (97.5 °F)   Resp 18   Ht 1.905 m (6' 3\")   Wt 76.2 kg (168 lb)   SpO2 96%   BMI 21.00 kg/m²   Vitals reviewed by myself.  Nursing note and vitals reviewed.  Constitutional: Well-developed and well-nourished. No distress.   HENT: Head is normocephalic and atraumatic. Oropharynx is clear and moist without exudate or erythema.   Eyes: Pupils are equal, round, and reactive to light. No horizontal or vertical nystagmus. Conjunctiva are normal.   Cardiovascular: Normal rate and regular rhythm. No murmur heard. Normal radial pulses.  Pulmonary/Chest: Breath sounds normal. No wheezes or rales.   Abdominal: Soft and non-tender. No distention.    Musculoskeletal: Extremities exhibit normal range of motion without edema or tenderness. Patient ambulates with a normal narrow-based steady gait.   Neurological: Awake, alert and oriented to person, place, and time. Baseline facial droop on the right side.  Baseline aphasia.  Strength is equal in all extremities.  Skin: Skin is warm and dry. No rash. Chronic-appearing cyst on his back with no signs of an overlying skin infection.   Psychiatric: Normal mood and affect. Appropriate for clinical " situation.      DIAGNOSTIC STUDIES:  LABS  Labs Reviewed   CBC WITH DIFFERENTIAL - Abnormal; Notable for the following components:       Result Value    RBC 4.33 (*)     Hemoglobin 11.8 (*)     Hematocrit 38.0 (*)     MCHC 31.1 (*)     All other components within normal limits    Narrative:     Indicate which anticoagulants the patient is on:->UNKNOWN  Biotin intake of greater than 5 mg per day may interfere with  troponin levels, causing false low values.   COMP METABOLIC PANEL - Abnormal; Notable for the following components:    Glucose 155 (*)     Bun 26 (*)     Total Protein 8.3 (*)     Globulin 4.0 (*)     All other components within normal limits    Narrative:     Indicate which anticoagulants the patient is on:->UNKNOWN  Biotin intake of greater than 5 mg per day may interfere with  troponin levels, causing false low values.   PROTHROMBIN TIME - Abnormal; Notable for the following components:    PT 16.6 (*)     INR 1.33 (*)     All other components within normal limits    Narrative:     Indicate which anticoagulants the patient is on:->UNKNOWN  Biotin intake of greater than 5 mg per day may interfere with  troponin levels, causing false low values.   TROPONIN - Abnormal; Notable for the following components:    Troponin T 30 (*)     All other components within normal limits    Narrative:     Indicate which anticoagulants the patient is on:->UNKNOWN  Biotin intake of greater than 5 mg per day may interfere with  troponin levels, causing false low values.   TROPONIN - Abnormal; Notable for the following components:    Troponin T 30 (*)     All other components within normal limits    Narrative:     Biotin intake of greater than 5 mg per day may interfere with  troponin levels, causing false low values.   APTT    Narrative:     Indicate which anticoagulants the patient is on:->UNKNOWN  Biotin intake of greater than 5 mg per day may interfere with  troponin levels, causing false low values.   COD (ADULT)    PROCALCITONIN   ESTIMATED GFR    Narrative:     Indicate which anticoagulants the patient is on:->UNKNOWN  Biotin intake of greater than 5 mg per day may interfere with  troponin levels, causing false low values.   ABO RH CONFIRM       All labs reviewed and independently interpreted by myself      EKG at 7:27 AM: Normal sinus rhythm, heart rate 82, normal axis, normal intervals, , , QTc 491, frequent PVCs, occasional bigeminy secondary to PVCs, no evidence of acute arrhythmia or ischemia    RADIOLOGY  Images independently interpreted by myself prior to radiologist review:  - no intracranial hemorrhage on head CT    Final interpretation by radiology demonstrates:    CT-CTA NECK WITH & W/O-POST PROCESSING   Final Result      CT angiogram of the neck within normal limits.      CT-CTA HEAD WITH & W/O-POST PROCESS   Final Result      1.  Bilateral M1 occlusion again identified which is likely chronic.      2.  No other occlusion or filling defects identified.      CT-HEAD W/O   Final Result         1.  Subtle brain swelling and edema is noted in the posterior right MCA territory surrounding pre-existing area of encephalomalacia that was documented on the prior CT.      2.  No intracranial hemorrhage.               CT-CEREBRAL PERFUSION ANALYSIS   Final Result      1.  Cerebral blood flow less than 30% likely representing completed infarct = 22 mL.      2.  T Max more than 6 seconds likely representing combination of completed infarct and ischemia = 55 mL.      3.  Mismatched volume likely representing ischemic brain/penumbra = 33 mL      4.  Please note that the cerebral perfusion was performed on the limited brain tissue around the basal ganglia region. Infarct/ischemia outside the CT perfusion sections can be missed in this study.      DX-CHEST-PORTABLE (1 VIEW)   Final Result         Hazy opacities in the right lower lobe, likely pneumonia.        The radiologist's interpretation of all radiological  studies have been reviewed by me.        COURSE & MEDICAL DECISION MAKING    ED Observation Status? Yes; I am placing the patient in to an observation status due to a diagnostic uncertainty as well as therapeutic intensity. Patient placed in observation status at 7:15 AM, 10/22/2023.     Observation plan is as follows: We will manage their symptoms, evaluate with diagnostic testing, and then reassess after results are reviewed     Upon Reevaluation, the patient's condition has: Improved; and will be discharged.    Patient discharged from ED Observation status at 10:15 AM (Time) 10/22/2023 (Date).     INITIAL ASSESSMENT AND PLAN    Patient is a 63-year-old male who presents for evaluation of paresthesias to the right side of his face.  Differential diagnosis includes stroke, TIA, recrudescence, electrolyte derangement.  Diagnostic work-up includes labs and CTA of head and neck.       REASSESSMENTS   7:12 AM - Patient was seen and evaluated at bedside. Patient presents to the ED for evaluation of a possible stroke. After my exam, I discussed with the patient the plan of care, which includes treating the patient with medication for their symptoms, as well as obtaining lab work and imaging for further evaluation. Patient understands and verbalizes agreement to plan of care.     8:54 AM - I discussed the patient's case and the above findings with Dr. Pérez (Neurology) who agrees to evaluate the patient's case.     9:05 AM - Patient was reevaluated at bedside. Discussed lab and radiology results with the patient and informed them of my consultation with Neurology. Patient understands and verbalizes agreement to plan of care.       10:16 AM - Patient was reevaluated at bedside. Discussed lab and radiology results with the patient and informed them of my consultation with Neurology and their recommendations. Patient understands. He also complained of a lump on his back which he has had for years. On exam, it looks like a  chronic cyst with no signs of overlying skin infection. I advised him to follow up with Dermatology. Patient understands. I then informed the patient of my plan for discharge, which includes strict return precautions for any new or worsening symptoms. Patient understands and verbalizes agreement to plan of care. Patient is comfortable going home at this time.      ER COURSE AND FINAL DISPOSITION   Patient's initial vitals are notable for hypertension, this improves without intervention.  CT imaging returns and demonstrates no acute intracranial hemorrhage, again is redemonstrated a mismatch deficit and bilateral M1 occlusions.  I discussed the case with on-call neurologist Dr. Pérez who advises given patient's underlying moyamoya syndrome, there is no acute intervention.  He will need to continue with specialty outpatient neurology follow-up.  At this time dual antiplatelet therapy would not be indicated as this would not help his symptoms.  No need for admission and acute intervention at this time per neurology team.    Labs returned and are independently interpreted by myself to demonstrate:  -Initial troponin is elevated, repeat troponin is done and is stable.  EKG is nonischemic, patient does not have any chest pain.  Elevated troponin likely related to his recent stroke  -Electrolytes and renal function are within normal limits  -Labs are otherwise unremarkable    At this time I spoke with case management regarding helping with outpatient neurology follow-up, case management and follow-up with patient.  He also provided him with medication refills to his bed.  He is advised that he will need ongoing follow-up for management of his strokes in the setting of moyamoya syndrome.  He is amenable to this plan.  He is given strict return precautions and discharged in stable condition.    ADDITIONAL PROBLEM LIST AND RESOURCE UTILIZATION    Additional problems aside from the chief complaint that I have addressed:  none    I have discussed management of the patient with the following physicians and GERA's:  Dr. Pérez (Neurology)    Discussion of management with other [facility] or appropriate source(s): None     Escalation of care considered, and ultimately not performed: see above.     Barriers to care at this time, including but not limited to: Patient does not have established PCP.  Patient lacks transportation and financial resources    Decision tools and prescription drugs considered including, but not limited to: see above.    Please see review of records as noted above    Patient will be discharged home.      FOLLOW UP:  [facility]  [id number]  [place] [address]  Call in 1 day(s)  Call the office as soon as possible to set up an appointment.   Contact numer: [phone].      OUTPATIENT MEDICATIONS:  Current Discharge Medication List          FINAL IMPRESSION  1. Paresthesias    2. Atrial fibrillation, unspecified type (HCC)    3. Primary hypertension    4. Medication refill          Andrea OLSON (Scribe), am scribing for, and in the presence of, Ariane Jimenez M.D..    Electronically signed by: Andrea Quintanilla (Scribe), 10/22/2023    IAriane M.D. personally performed the services described in this documentation, as scribed by Andrea Quintanilla in my presence, and it is both accurate and complete.    The note accurately reflects work and decisions made by me.  Ariane Jimenez M.D.  10/22/2023  1:28 PM

## 2023-10-22 NOTE — DISCHARGE PLANNING
ER  Note:  Received call from ERP requesting for assistance regarding pt's neurology referral. Per chart review, neurology referral was approved by insurance and referral was sent to:  [facility]  [id number]  [place] [address]  [phone]  SCHEDULING: If you do not already have an appointment, please call [phone] to make an appointment.    RN CM spoke to pt at bedside. Pt confirmed his contact number in the facesheet. Pt reports he is staying at a motel, he has income from social security, and is familiar with [facility], if needed. Pt said he does not have a PCP. RN CM called and left a voicemail for hospital  for PCP appointment. Informed pt that his neurology referral was sent to [facility]. RN CM called the office but they are closed on weekends. Left voicemail for call back. Per pt, he will call the office tomorrow, Monday, to set up an appointment. Pt verbalized understanding and denies other needs at this time. ER RN and ERP updated.     1015: RN CM spoke to pt at bedside. RN CM offered bus pass for transportation assistance but pt declined. Pt reports he uses a cab for transportation and he can call his own cab. Pt said he cannot go back to his motel. RN CM asked pt if he needs assistance going to the shelter or where he plans on going at discharge. Per pt, he is calling his brother (Araceli's ) who also lives in [place] for transportation and place to stay after discharge. Pt requested for refills of his medications. ERP updated. RN CM confirmed with [facility] that they accept pt's insurance. RN CM called pharmacy, they received prescriptions, no copay, will be ready for  in about 45 minutes. ER RN updated.     1125: Medications ready. Medications picked up from pharmacy, given to ER RN. RN CM spoke to pt at bedside. Per pt, he said he was not able to reach his brother. He talked to his sister in law  but not sure what she can do. Per pt, he will go to the [facility] at discharge. Bus pass for shelter and neurology appointment given per pt request. Reminded pt again to call neurology office tomorrow for appointment. Informed pt that he can also request for assistance from shelter case workers, if needed. Pt verbalized understanding and denies other needs at this time. ER RN and ERP updated.

## 2023-10-22 NOTE — ED NOTES
Bedside report rec'd from LISSET Lopez.  Pt resting in [facility].  Pt on RA.    Call light in reach.  ERP into see pt.

## 2023-10-22 NOTE — ED TRIAGE NOTES
"Chief Complaint   Patient presents with    Other     Patient complaining of a sensation to the left side of head. Patient states that he felt this sensation before his last stroke. Patient has hx of 2 strokes in the past.       BIB EMS to [facility], pt on monitor and in gown, labs drawn and sent. Pt consists of: sensation of tingling to right face.    Medications given en route:None    BP (!) 191/88   Pulse 60   Temp 36.4 °C (97.5 °F)   Resp 18   Ht 1.905 m (6' 3\")   Wt 76.2 kg (168 lb)   SpO2 98%   BMI 21.00 kg/m²   "

## 2023-10-23 ENCOUNTER — APPOINTMENT (OUTPATIENT)
Dept: RADIOLOGY | Facility: MEDICAL CENTER | Age: 63
End: 2023-10-23
Attending: STUDENT IN AN ORGANIZED HEALTH CARE EDUCATION/TRAINING PROGRAM
Payer: MEDICARE

## 2023-10-23 ENCOUNTER — PATIENT OUTREACH (OUTPATIENT)
Dept: SCHEDULING | Facility: IMAGING CENTER | Age: 63
End: 2023-10-23
Payer: COMMERCIAL

## 2023-10-23 ENCOUNTER — HOSPITAL ENCOUNTER (EMERGENCY)
Facility: MEDICAL CENTER | Age: 63
End: 2023-10-23
Attending: STUDENT IN AN ORGANIZED HEALTH CARE EDUCATION/TRAINING PROGRAM
Payer: MEDICARE

## 2023-10-23 VITALS
OXYGEN SATURATION: 95 % | RESPIRATION RATE: 17 BRPM | DIASTOLIC BLOOD PRESSURE: 79 MMHG | BODY MASS INDEX: 20.89 KG/M2 | HEIGHT: 75 IN | TEMPERATURE: 97.7 F | WEIGHT: 168 LBS | HEART RATE: 92 BPM | SYSTOLIC BLOOD PRESSURE: 142 MMHG

## 2023-10-23 DIAGNOSIS — D64.9 ANEMIA, UNSPECIFIED TYPE: ICD-10-CM
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
## 2023-10-23 DIAGNOSIS — S09.90XA CLOSED HEAD INJURY, INITIAL ENCOUNTER: ICD-10-CM

## 2023-10-23 DIAGNOSIS — R94.31 ABNORMAL EKG: ICD-10-CM

## 2023-10-23 DIAGNOSIS — Z59.00 HOMELESS: ICD-10-CM

## 2023-10-23 LAB
ALBUMIN SERPL BCP-MCNC: 4.1 G/DL (ref 3.2–4.9)
ALBUMIN/GLOB SERPL: 1.2 G/DL
ALP SERPL-CCNC: 90 U/L (ref 30–99)
ALT SERPL-CCNC: 14 U/L (ref 2–50)
ANION GAP SERPL CALC-SCNC: 14 MMOL/L (ref 7–16)
AST SERPL-CCNC: 17 U/L (ref 12–45)
BASOPHILS # BLD AUTO: 0.7 % (ref 0–1.8)
BASOPHILS # BLD: 0.06 K/UL (ref 0–0.12)
BILIRUB SERPL-MCNC: 0.2 MG/DL (ref 0.1–1.5)
BUN SERPL-MCNC: 22 MG/DL (ref 8–22)
CALCIUM ALBUM COR SERPL-MCNC: 8.9 MG/DL (ref 8.5–10.5)
CALCIUM SERPL-MCNC: 9 MG/DL (ref 8.5–10.5)
CHLORIDE SERPL-SCNC: 103 MMOL/L (ref 96–112)
CO2 SERPL-SCNC: 22 MMOL/L (ref 20–33)
CREAT SERPL-MCNC: 1.18 MG/DL (ref 0.5–1.4)
EKG IMPRESSION: NORMAL
EOSINOPHIL # BLD AUTO: 0.39 K/UL (ref 0–0.51)
EOSINOPHIL NFR BLD: 4.5 % (ref 0–6.9)
ERYTHROCYTE [DISTWIDTH] IN BLOOD BY AUTOMATED COUNT: 44.2 FL (ref 35.9–50)
ETHANOL BLD-MCNC: <10.1 MG/DL
GFR SERPLBLD CREATININE-BSD FMLA CKD-EPI: 69 ML/MIN/1.73 M 2
GLOBULIN SER CALC-MCNC: 3.5 G/DL (ref 1.9–3.5)
GLUCOSE SERPL-MCNC: 190 MG/DL (ref 65–99)
HCT VFR BLD AUTO: 34.3 % (ref 42–52)
HGB BLD-MCNC: 10.8 G/DL (ref 14–18)
IMM GRANULOCYTES # BLD AUTO: 0.03 K/UL (ref 0–0.11)
IMM GRANULOCYTES NFR BLD AUTO: 0.3 % (ref 0–0.9)
LYMPHOCYTES # BLD AUTO: 1.83 K/UL (ref 1–4.8)
LYMPHOCYTES NFR BLD: 21.3 % (ref 22–41)
MAGNESIUM SERPL-MCNC: 1.5 MG/DL (ref 1.5–2.5)
MCH RBC QN AUTO: 26.9 PG (ref 27–33)
MCHC RBC AUTO-ENTMCNC: 31.5 G/DL (ref 32.3–36.5)
MCV RBC AUTO: 85.5 FL (ref 81.4–97.8)
MONOCYTES # BLD AUTO: 0.55 K/UL (ref 0–0.85)
MONOCYTES NFR BLD AUTO: 6.4 % (ref 0–13.4)
NEUTROPHILS # BLD AUTO: 5.74 K/UL (ref 1.82–7.42)
NEUTROPHILS NFR BLD: 66.8 % (ref 44–72)
NRBC # BLD AUTO: 0 K/UL
NRBC BLD-RTO: 0 /100 WBC (ref 0–0.2)
PLATELET # BLD AUTO: 286 K/UL (ref 164–446)
PMV BLD AUTO: 9.4 FL (ref 9–12.9)
POTASSIUM SERPL-SCNC: 3.8 MMOL/L (ref 3.6–5.5)
PROT SERPL-MCNC: 7.6 G/DL (ref 6–8.2)
RBC # BLD AUTO: 4.01 M/UL (ref 4.7–6.1)
SODIUM SERPL-SCNC: 139 MMOL/L (ref 135–145)
WBC # BLD AUTO: 8.6 K/UL (ref 4.8–10.8)

## 2023-10-23 PROCEDURE — 36415 COLL VENOUS BLD VENIPUNCTURE: CPT

## 2023-10-23 PROCEDURE — 85025 COMPLETE CBC W/AUTO DIFF WBC: CPT

## 2023-10-23 PROCEDURE — 700111 HCHG RX REV CODE 636 W/ 250 OVERRIDE (IP): Performed by: STUDENT IN AN ORGANIZED HEALTH CARE EDUCATION/TRAINING PROGRAM

## 2023-10-23 PROCEDURE — 72125 CT NECK SPINE W/O DYE: CPT

## 2023-10-23 PROCEDURE — 71045 X-RAY EXAM CHEST 1 VIEW: CPT

## 2023-10-23 PROCEDURE — 93005 ELECTROCARDIOGRAM TRACING: CPT | Performed by: STUDENT IN AN ORGANIZED HEALTH CARE EDUCATION/TRAINING PROGRAM

## 2023-10-23 PROCEDURE — 70450 CT HEAD/BRAIN W/O DYE: CPT

## 2023-10-23 PROCEDURE — 96365 THER/PROPH/DIAG IV INF INIT: CPT

## 2023-10-23 PROCEDURE — 99285 EMERGENCY DEPT VISIT HI MDM: CPT

## 2023-10-23 PROCEDURE — 82077 ASSAY SPEC XCP UR&BREATH IA: CPT

## 2023-10-23 PROCEDURE — 83735 ASSAY OF MAGNESIUM: CPT

## 2023-10-23 PROCEDURE — 80053 COMPREHEN METABOLIC PANEL: CPT

## 2023-10-23 RX ORDER — MAGNESIUM SULFATE 1 G/100ML
1 INJECTION INTRAVENOUS ONCE
Status: COMPLETED | OUTPATIENT
Start: 2023-10-23 | End: 2023-10-23

## 2023-10-23 RX ADMIN — MAGNESIUM SULFATE IN DEXTROSE 1 G: 10 INJECTION, SOLUTION INTRAVENOUS at 02:41

## 2023-10-23 SDOH — ECONOMIC STABILITY - HOUSING INSECURITY: HOMELESSNESS UNSPECIFIED: Z59.00

## 2023-10-23 ASSESSMENT — FIBROSIS 4 INDEX: FIB4 SCORE: 1.18

## 2023-10-23 NOTE — ED NOTES
Wheeled by ronni Hastings to the lobby with cab voucher going to [facility]. All belongings with patient; ambulatory, [id number] [id number]
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
## 2023-10-23 NOTE — ED NOTES
Chief Complaint   Patient presents with    Cold Symptoms     exposed to flu and strep at work, having headache, cough, sore throat and chills for 2 days Pt appears to be sleeping, laying supine on gurney. Continuous monitoring in place. Call light within reach.

## 2023-10-23 NOTE — ED PROVIDER NOTES
"CHIEF COMPLAINT  Chief Complaint   Patient presents with    T-5000 Head Injury     Pt trip down earlier on the ground and hit his head and upper back area  No loss of consciousness  Pt on blood thinners  TBI announced, ERP examined pt  Sent to CT scan  Endorsed to the assigned RN    Alert and Oriented x 4       LIMITATION TO HISTORY   Select:     HPI    Zane Galeano is a 63 y.o. male who presents to the Emergency Department for evaluation, patient was on the hospital campus after being discharged yesterday morning he believes he struck the right side of his head on concrete does not think he fell reports headache denies pain elsewhere.  Patient report he did not have anywhere to go upon discharge, he is on Eliquis and was a \"TBI activation\"    OUTSIDE HISTORIAN(S):  Select:    EXTERNAL RECORDS REVIEWED  Select: Reviewed ER visit from October 22      PAST MEDICAL HISTORY  Past Medical History:   Diagnosis Date    CAD (coronary artery disease)     Diabetes (HCC)     Hypertension     Stroke (HCC)      .    SURGICAL HISTORY  History reviewed. No pertinent surgical history.      FAMILY HISTORY  History reviewed. No pertinent family history.       SOCIAL HISTORY  Social History     Socioeconomic History    Marital status: Single     Spouse name: Not on file    Number of children: Not on file    Years of education: Not on file    Highest education level: Not on file   Occupational History    Not on file   Tobacco Use    Smoking status: Never    Smokeless tobacco: Not on file   Vaping Use    Vaping Use: Never used   Substance and Sexual Activity    Alcohol use: Not Currently    Drug use: Yes    Sexual activity: Not on file   Other Topics Concern    Not on file   Social History Narrative    Not on file     Social Determinants of Health     Financial Resource Strain: Not on file   Food Insecurity: Not on file   Transportation Needs: Not on file   Physical Activity: Not on file   Stress: Not on file   Social Connections: " "Not on file   Intimate Partner Violence: Not on file   Housing Stability: Not on file         CURRENT MEDICATIONS  No current facility-administered medications on file prior to encounter.     Current Outpatient Medications on File Prior to Encounter   Medication Sig Dispense Refill    amLODIPine (NORVASC) 5 MG Tab Take 1 Tablet by mouth every day. 30 Tablet 0    apixaban (ELIQUIS) 5mg Tab Take 1 Tablet by mouth 2 times a day. Indications: Thromboembolism secondary to Atrial Fibrillation 60 Tablet 0    atorvastatin (LIPITOR) 40 MG Tab Take 1 Tablet by mouth every day. 30 Tablet 0    insulin glargine (LANTUS SOLOSTAR) 100 UNIT/ML Solution Pen-injector injection Inject 25 Units under the skin every morning. 15 mL 0    losartan (COZAAR) 50 MG Tab Take 1 Tablet by mouth every day. 30 Tablet 0    metformin (GLUCOPHAGE) 1000 MG tablet Take 1 Tablet by mouth 2 times a day with meals. 60 Tablet 0    insulin glargine (LANTUS SOLOSTAR) 100 UNIT/ML Solution Pen-injector injection Inject 25 Units under the skin every morning. 15 mL 2           ALLERGIES  Allergies   Allergen Reactions    Vancomycin Unspecified     Suspected induced nephropathy        PHYSICAL EXAM  VITAL SIGNS:BP (!) 142/79   Pulse 92   Temp 36.5 °C (97.7 °F)   Resp 17   Ht 1.905 m (6' 3\")   Wt 76.2 kg (168 lb)   SpO2 95%   BMI 21.00 kg/m²       GENERAL: Awake and alert  HEAD: Normocephalic and atraumatic  NECK: Normal range of motion, without meningismus  EYES: Pupils Equal, Round, Reactive to Light, extraocular movements intact, conjunctiva white  ENT: Mucous membranes moist, oropharynx clear  PULMONARY: Normal effort, clear to auscultation  CARDIOVASCULAR: No murmurs, clicks or rubs, peripheral pulses 2+  ABDOMINAL: Soft, non-tender, no guarding or rigidity present, no pulsatile masses  BACK: no midline tenderness, no costovertebral tenderness  NEUROLOGICAL: Alert and oriented to person place and time grossly non-focal neurological examination, speech " normal, gait normal  EXTREMITIES: No edema, normal to inspection  SKIN: Warm and dry.  PSYCHIATRIC: Affect is appropriate    DIAGNOSTIC STUDIES / PROCEDURES  EKG  EKG Interpretation: I independently reviewed the below EKG and did not see signs of a STEMI bigeminy is present  Results for orders placed or performed during the hospital encounter of 10/23/23   EKG   Result Value Ref Range    Report       [facility] Emergency Dept.    Test Date:  2023-10-23  Pt Name:    ELOISA FLAHERTY                 Department: ER  MRN:        [id number]                      Room:        16  Gender:     Male                         Technician: [id number]  :        1960                   Requested By:MONTEZ WALKER  Order #:    [id number]                    Reading MD:    Measurements  Intervals                                Axis  Rate:       90                           P:          41  CA:         167                          QRS:        82  QRSD:       102                          T:          46  QT:         396  QTc:        485    Interpretive Statements  Sinus rhythm  Ventricular bigeminy  Borderline right axis deviation  Compared to ECG 10/22/2023 07:27:43  No significant changes       Cardiac Monitor Interpretation:    Time: 4:30 AM  The cardiac monitor revealed normal sinus rhythm as interpreted by me.  The cardiac monitor was ordered secondary to the patient´s history of bigeminy to monitor the patient for dysrhythmia      LABS  Labs Reviewed   CBC WITH DIFFERENTIAL - Abnormal; Notable for the following components:       Result Value    RBC 4.01 (*)     Hemoglobin 10.8 (*)     Hematocrit 34.3 (*)     MCH 26.9 (*)     MCHC 31.5 (*)     Lymphocytes 21.30 (*)     All other components within normal limits   COMP METABOLIC PANEL - Abnormal; Notable for the following components:    Glucose 190 (*)     All other components within normal limits   DIAGNOSTIC ALCOHOL   MAGNESIUM   ESTIMATED GFR         RADIOLOGY  I  independently reviewed and interpreted the images obtained today in the ER.  No intracranial hemorrhage    Radiologist interpretation:   DX-CHEST-LIMITED (1 VIEW)   Final Result         1.  No acute cardiopulmonary disease.      CT-CSPINE WITHOUT PLUS RECONS   Final Result         1.  Multilevel degenerative changes of the cervical spine limit diagnostic sensitivity of this examination, otherwise no acute traumatic bony injury of the cervical spine is apparent.   2.  Atherosclerosis      CT-HEAD W/O   Final Result         1.  No acute intracranial abnormality is identified, there are nonspecific white matter changes, commonly associated with small vessel ischemic disease.  Associated mild cerebral atrophy is noted.   2.  Atherosclerosis.              COURSE & MEDICAL DECISION MAKING    ED COURSE:    ED Observation Status? Yes; I am placing the patient in to an observation status due to a diagnostic uncertainty as well as therapeutic intensity. Patient placed in observation status at 210 AM October 23, 2023  Observation plan is as follows: Given patient's trauma and age and that he takes Eliquis we will obtain CT of the brain patient does not appear to have sustained a syncopal episode however given his age and recent ER visit we will perform a syncopal work-up to ensure does not have a medical etiology to his fall    INTERVENTIONS BY ME:  Medications   magnesium sulfate in D5W IVPB premix 1 g (0 g Intravenous Stopped 10/23/23 0341)       Response on recheck:  4:30 AM patient had bigeminy on EKG which is no longer present he is well-appearing denies symptoms at this time  5:30 AM discussed with the patient is work-up so far he is amatory without difficulty feeling well normal vital signs normal examination at this time, further history obtained from nurse and security that the patient upon discharge yesterday was found on the couch in the hospital and asked to leave the premises the patient reported he did not want  "to leave the premises, , security told him if he had a medical problem come to the emergency department otherwise they would escort him off the property, after which patient began to report that he thinks he tripped and fell striking his head on the wall presenting to the ER this evening.    5:50 AM discussed patient's work-up so far he is amenable appropriate for discharge at this time, patient reports he does not want to be discharged because it is \"cold and I am homeless\".   consulted by nurse for safe discharge planning I do feel, patient is medically safe to be discharged home and I feel he is safe to be discharged and that he is adequately equipped to care for himself at this time patient discharged from ED observation at 5:50 AM October 23, 2023        INITIAL ASSESSMENT, COURSE AND PLAN  Care Narrative:     Patient presenting after reported head injury no trauma on examination his fall appears mechanical and nonsyncopal in nature given his age and that he is on Eliquis CT of the brain and cervical spine were obtained in addition to chest radiograph, his EKG was notable for some bigeminy which has been present on previous EKGs he had no presyncope symptoms no syncopal episodes his bigeminy resolved on the cardiac monitor and do feel patient is safe to be discharged.  Work-up without evidence of any significant traumatic injuries, notable for anemia which appears to be improved from his last visit earlier this month no signs of active bleeding or symptomatic anemia today.     ADDITIONAL PROBLEM LIST    DISPOSITION AND DISCUSSIONS      Discussion of management with other [facility] or appropriate source(s):     Escalation of care considered, and ultimately not performed:acute inpatient care management, however at this time, the patient is most appropriate for outpatient management    Barriers to care at this time, including but not limited to: Patient is homeless.         FINAL DIAGNOSIS  1. " Closed head injury, initial encounter    2. Anemia, unspecified type    3. Ground-level fall    4. Homeless    5. Abnormal EKG             Electronically signed by: Grayson Hughes DO ,12:56 AM 10/23/23

## 2023-10-23 NOTE — DISCHARGE PLANNING
Medical Social Work    MSW received a voalte message from bedside RN that pt is stating he has no where to go.  MSW provided bedside RN with cab voucher (# [phone]) for pt to get to [facility] safely.  Per chart review pt has been homeless off and on for several years and is familiar with local homeless resources.

## 2023-10-23 NOTE — ED NOTES
Infusion initiated. Bed in position of comfort. Lights turned down, per pt request. Call light within reach. Continuous monitoring in place.

## 2023-10-23 NOTE — ED NOTES
Report to Daphney DARLING. Pt on [facility], room air. Continuous monitoring in place. Call light within reach.

## 2023-10-23 NOTE — ED NOTES
Pt resting comfortably on gurney, laying right lateral. Continuous monitoring in place. Call light within reach.

## 2023-10-25 ENCOUNTER — APPOINTMENT (OUTPATIENT)
Dept: RADIOLOGY | Facility: MEDICAL CENTER | Age: 63
End: 2023-10-25
Attending: STUDENT IN AN ORGANIZED HEALTH CARE EDUCATION/TRAINING PROGRAM
Payer: MEDICARE

## 2023-10-25 ENCOUNTER — HOSPITAL ENCOUNTER (EMERGENCY)
Facility: MEDICAL CENTER | Age: 63
End: 2023-10-25
Attending: STUDENT IN AN ORGANIZED HEALTH CARE EDUCATION/TRAINING PROGRAM
Payer: MEDICARE

## 2023-10-25 VITALS
WEIGHT: 168 LBS | RESPIRATION RATE: 16 BRPM | OXYGEN SATURATION: 98 % | SYSTOLIC BLOOD PRESSURE: 151 MMHG | BODY MASS INDEX: 20.89 KG/M2 | HEART RATE: 51 BPM | DIASTOLIC BLOOD PRESSURE: 70 MMHG | HEIGHT: 75 IN | TEMPERATURE: 98.2 F
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
## 2023-10-25 DIAGNOSIS — S09.90XA CLOSED HEAD INJURY, INITIAL ENCOUNTER: ICD-10-CM

## 2023-10-25 PROCEDURE — 72125 CT NECK SPINE W/O DYE: CPT

## 2023-10-25 PROCEDURE — 99285 EMERGENCY DEPT VISIT HI MDM: CPT

## 2023-10-25 PROCEDURE — 70450 CT HEAD/BRAIN W/O DYE: CPT

## 2023-10-25 PROCEDURE — A9270 NON-COVERED ITEM OR SERVICE: HCPCS | Performed by: STUDENT IN AN ORGANIZED HEALTH CARE EDUCATION/TRAINING PROGRAM

## 2023-10-25 PROCEDURE — 700102 HCHG RX REV CODE 250 W/ 637 OVERRIDE(OP): Performed by: STUDENT IN AN ORGANIZED HEALTH CARE EDUCATION/TRAINING PROGRAM

## 2023-10-25 RX ORDER — METHOCARBAMOL 500 MG/1
1000 TABLET, FILM COATED ORAL ONCE
Status: COMPLETED | OUTPATIENT
Start: 2023-10-25 | End: 2023-10-25

## 2023-10-25 RX ORDER — ACETAMINOPHEN 500 MG
1000 TABLET ORAL ONCE
Status: COMPLETED | OUTPATIENT
Start: 2023-10-25 | End: 2023-10-25

## 2023-10-25 RX ORDER — METHOCARBAMOL 750 MG/1
1500 TABLET, FILM COATED ORAL 3 TIMES DAILY
Qty: 20 TABLET | Refills: 0 | Status: SHIPPED | OUTPATIENT
Start: 2023-10-25

## 2023-10-25 RX ADMIN — METHOCARBAMOL 1000 MG: 500 TABLET ORAL at 22:18

## 2023-10-25 RX ADMIN — ACETAMINOPHEN 1000 MG: 500 TABLET, FILM COATED ORAL at 22:17

## 2023-10-25 ASSESSMENT — FIBROSIS 4 INDEX: FIB4 SCORE: 1

## 2023-10-25 ASSESSMENT — PAIN DESCRIPTION - PAIN TYPE
TYPE: ACUTE PAIN
TYPE: ACUTE PAIN

## 2023-10-26 NOTE — ED TRIAGE NOTES
Chief Complaint   Patient presents with    Head Injury     Pt trip down earlier on the ground and hit his head at around 3 PM  Complaining headache and neck pain    Pt was seen 2 days ago with the same complaints  Pt on neck collar  No loss of consciousness  Pt on blood thinners  TBI announced, ERP examined pt  Sent to CT scan  Endorsed to the assigned RN     Alert and Oriented x 4     Brought by EMS with the abop    Pt is alert and oriented x 4, speaking in full sentences, follows commands and responds appropriately to questions.     Respirations are even and unlabored.      Vitals:    10/25/23 2104   BP: 132/60   Pulse: 77   Resp: 18   Temp: 36.8 °C (98.2 °F)   SpO2: 97%

## 2023-10-26 NOTE — ED NOTES
Pt medicated per MAR, po challenge initiated. Pt resting with even chest rise and fall, reports no needs at this time, call light available and in reach.

## 2023-10-26 NOTE — ED NOTES
"Patient given discharge instructions and verbalizes understanding. Pt visibly upset after being told he is being discharged. RN asked pt why pt was upset. Pt yelled \"I just want a day's rest!\". RN asked where pt was staying pt would not reply. RN asked if pt would like RN to speak to social work about housing. PT denied offer and told RN that he just wants to make a phone call. Pt escorted in wheelchair with all belongings out to lobby and was shown phone.   "

## 2023-10-26 NOTE — ED NOTES
Bedside report received from off going RNMichael, assumed care of patient.  POC discussed with patient. Call light within reach, all needs addressed at this time.       Fall risk interventions in place: Patient's personal possessions are with in their safe reach, Place fall risk sign on patient's door, Give patient urinal if applicable, Keep floor surfaces clean and dry, and Accompanied to restroom (all applicable per [place] Fall risk assessment)   Continuous monitoring: Pulse Ox or Blood Pressure  IVF/IV medications: Not Applicable   Oxygen: Room Air  Bedside sitter: Not Applicable   Isolation: Not Applicable

## 2023-10-26 NOTE — ED PROVIDER NOTES
"ED Provider Note    CHIEF COMPLAINT  Chief Complaint   Patient presents with    Head Injury     Pt trip down earlier on the ground and hit his head at around 3 PM  Complaining headache and neck pain    Pt was seen 2 days ago with the same complaints  Pt on neck collar  No loss of consciousness  Pt on blood thinners  TBI announced, ERP examined pt  Sent to CT scan  Endorsed to the assigned RN     Alert and Oriented x 4       EXTERNAL RECORDS REVIEWED  External ED Note ER visit on 8/2/2021 at outside hospital for closed head injury    HPI/ROS  LIMITATION TO HISTORY   Select: : None  OUTSIDE HISTORIAN(S):  EMS reports the patient tripped over his bag in a parking lot and fell onto his forehead.    Zane Galeano is a 63 y.o. male who presents with a ground-level fall onto concrete and head trauma.  Patient is on anticoagulation for CVA.  Patient reports headache and neck pain.  Patient reports his head \"bounced off the ground like a basketball\".  Patient denies extremity chest or trunk pain.  Patient reports the fall was secondary to a trip.     PAST MEDICAL HISTORY   has a past medical history of CAD (coronary artery disease), Diabetes (HCC), Hypertension, and Stroke (HCC).    SURGICAL HISTORY  patient denies any surgical history    FAMILY HISTORY  History reviewed. No pertinent family history.    SOCIAL HISTORY  Social History     Tobacco Use    Smoking status: Never    Smokeless tobacco: Not on file   Vaping Use    Vaping Use: Never used   Substance and Sexual Activity    Alcohol use: Not Currently    Drug use: Yes    Sexual activity: Not on file       CURRENT MEDICATIONS  Home Medications       Reviewed by Devin Cabral R.N. (Registered Nurse) on 10/25/23 at 2108  Med List Status: Partial     Medication Last Dose Status   amLODIPine (NORVASC) 5 MG Tab  Active   apixaban (ELIQUIS) 5mg Tab  Active   atorvastatin (LIPITOR) 40 MG Tab  Active   insulin glargine (LANTUS SOLOSTAR) 100 UNIT/ML Solution Pen-injector " "injection  Active   insulin glargine (LANTUS SOLOSTAR) 100 UNIT/ML Solution Pen-injector injection  Active   losartan (COZAAR) 50 MG Tab  Active   metformin (GLUCOPHAGE) 1000 MG tablet  Active                    ALLERGIES  Allergies   Allergen Reactions    Vancomycin Unspecified     Suspected induced nephropathy        PHYSICAL EXAM  VITAL SIGNS: /60   Pulse 77   Temp 36.8 °C (98.2 °F) (Temporal)   Resp 18   Ht 1.905 m (6' 3\")   Wt 76.2 kg (168 lb)   SpO2 97%   BMI 21.00 kg/m²    Vitals and nursing note reviewed.   Constitutional:       Comments: Patient is lying in bed supine, pleasant, conversant, speaking in complete sentences   HENT:      Head: Forehead tenderness to palpation without abrasions or ecchymosis or lacerations  Eyes:      Extraocular Movements: Extraocular movements intact.      Conjunctiva/sclera: Conjunctivae normal.      Pupils: Pupils are equal, round, and reactive to light.   Cardiovascular:      Pulses: Normal pulses.      Comments: HR 77  Pulmonary:      Effort: Pulmonary effort is normal. No respiratory distress.   Abdominal:      Comments: Abdomen is soft, non-tender, non-distended, non-rigid, no rebound, guarding, masses, no McBurney's point tenderness, no peritoneal signs, negative Rovsing sign, negative Jimenez sign.  No CVA tenderness to palpation. Benign abdomen.   Musculoskeletal:         General: No swelling. Normal range of motion.      Cervical back: Normal range of motion. No rigidity.   No midline T or L-spine tenderness to palpation.  Patient has midline C-spine tenderness to palpation but no step-offs or deformities.  Skin:     General: Skin is warm and dry.      Capillary Refill: Capillary refill takes less than 2 seconds.   Neurological:      Mental Status: Alert.       DIAGNOSTIC STUDIES / PROCEDURES      RADIOLOGY  I have independently interpreted the diagnostic imaging associated with this visit and am waiting the final reading from the radiologist.   My " preliminary interpretation is as follows: No intracranial hemorrhage or skull fracture  Radiologist interpretation: No intracranial hemorrhage, skull fracture, cervical spine fracture    COURSE & MEDICAL DECISION MAKING    INITIAL ASSESSMENT, COURSE AND PLAN  Care Narrative: Fall likely mechanical in nature, no evidence of precipitating event such as acute cardiac pulmonary process requiring lab work.  CT imaging of the head and neck to rule out skull fracture, cervical spine fracture dislocation, intracranial hemorrhage or other acute intracranial process.  Disposition pending work-up and observation.    Electronically signed by: Juan Hampton M.D., 10/25/2023 9:06 PM    Patient receiving Tylenol and Robaxin for symptom control.  Patient without intracranial hemorrhage, skull fracture, cervical spine fracture.  Patient tolerating p.o. and ambulating without assistance.  Patient likely suffered from mechanical ground-level fall and will follow-up outpatient with PCP.    Repeat physical exam benign.  I doubt any serious emergency process at this time.  Patient and/or family, friends given strict return precautions for worsening symptoms and care instructions. They have demonstrated understanding of discharge instructions through teach back mechanism. Advised PCP follow-up in 1-2 days.  Patient/family/friend expresses understanding and agrees to plan.    This dictation has been created using voice recognition software. I am continuously working with the software to minimize the number of voice recognition errors and I have made every attempt to manually correct the errors within my dictation. However errors  related to this voice recognition software may still exist and should be interpreted within the appropriate context.     Electronically signed by: Juan Hampton M.D., 10/25/2023 10:04 PM    DISPOSITION AND DISCUSSIONS    Escalation of care considered, and ultimately not performed:blood analysis and  acute inpatient care management, however at this time, the patient is most appropriate for outpatient management    Decision tools and prescription drugs considered including, but not limited to: NEXUS criteria positive for neck tenderness .    FINAL DIAGNOSIS  1. Closed head injury, initial encounter    2. Ground-level fall           Electronically signed by: Juan Hampton M.D., 10/25/2023 9:04 PM

## 2023-11-01 NOTE — PROGRESS NOTES
"Patient refusing to participate at 1200 neuro assessment. Patient states he is \"tired of this bullshit\". Patient requesting to speaking to doctor. Hospitalist notified.  " Spontaneous